# Patient Record
Sex: MALE | Race: WHITE | Employment: OTHER | URBAN - METROPOLITAN AREA
[De-identification: names, ages, dates, MRNs, and addresses within clinical notes are randomized per-mention and may not be internally consistent; named-entity substitution may affect disease eponyms.]

---

## 2018-04-17 ENCOUNTER — HOSPITAL ENCOUNTER (OUTPATIENT)
Dept: NON INVASIVE DIAGNOSTICS | Age: 73
Discharge: HOME | End: 2018-04-17
Payer: MEDICARE

## 2018-04-17 LAB
LV EF: 60 %
LVEF MODALITY: NORMAL

## 2018-04-17 PROCEDURE — 93306 TTE W/DOPPLER COMPLETE: CPT

## 2018-04-26 ENCOUNTER — HOSPITAL ENCOUNTER (OUTPATIENT)
Dept: CARDIAC CATH/INVASIVE PROCEDURES | Age: 73
Discharge: HOME | End: 2018-04-26
Payer: MEDICARE

## 2018-04-26 VITALS
TEMPERATURE: 97.3 F | HEART RATE: 87 BPM | WEIGHT: 220 LBS | OXYGEN SATURATION: 97 % | DIASTOLIC BLOOD PRESSURE: 115 MMHG | SYSTOLIC BLOOD PRESSURE: 154 MMHG | BODY MASS INDEX: 33.34 KG/M2 | HEIGHT: 68 IN

## 2018-04-26 LAB
ABO/RH: NORMAL
ANION GAP SERPL CALCULATED.3IONS-SCNC: 13 MMOL/L (ref 7–16)
ANTIBODY SCREEN: NORMAL
BUN BLDV-MCNC: 13 MG/DL (ref 8–23)
CALCIUM SERPL-MCNC: 9.4 MG/DL (ref 8.6–10.2)
CHLORIDE BLD-SCNC: 99 MMOL/L (ref 98–107)
CO2: 24 MMOL/L (ref 22–29)
CREAT SERPL-MCNC: 0.9 MG/DL (ref 0.7–1.2)
GFR AFRICAN AMERICAN: >60
GFR NON-AFRICAN AMERICAN: >60 ML/MIN/1.73
GLUCOSE BLD-MCNC: 204 MG/DL (ref 74–109)
HCT VFR BLD CALC: 46.5 % (ref 37–54)
HEMOGLOBIN: 15.8 G/DL (ref 12.5–16.5)
MCH RBC QN AUTO: 31.8 PG (ref 26–35)
MCHC RBC AUTO-ENTMCNC: 34 % (ref 32–34.5)
MCV RBC AUTO: 93.6 FL (ref 80–99.9)
PDW BLD-RTO: 12.6 FL (ref 11.5–15)
PLATELET # BLD: 241 E9/L (ref 130–450)
PMV BLD AUTO: 10.8 FL (ref 7–12)
POTASSIUM SERPL-SCNC: 7.1 MMOL/L (ref 3.5–5)
RBC # BLD: 4.97 E12/L (ref 3.8–5.8)
SODIUM BLD-SCNC: 136 MMOL/L (ref 132–146)
WBC # BLD: 9.3 E9/L (ref 4.5–11.5)

## 2018-04-26 PROCEDURE — C1894 INTRO/SHEATH, NON-LASER: HCPCS

## 2018-04-26 PROCEDURE — 6360000002 HC RX W HCPCS

## 2018-04-26 PROCEDURE — 85027 COMPLETE CBC AUTOMATED: CPT

## 2018-04-26 PROCEDURE — 99204 OFFICE O/P NEW MOD 45 MIN: CPT | Performed by: THORACIC SURGERY (CARDIOTHORACIC VASCULAR SURGERY)

## 2018-04-26 PROCEDURE — 86900 BLOOD TYPING SEROLOGIC ABO: CPT

## 2018-04-26 PROCEDURE — 93458 L HRT ARTERY/VENTRICLE ANGIO: CPT | Performed by: INTERNAL MEDICINE

## 2018-04-26 PROCEDURE — 86901 BLOOD TYPING SEROLOGIC RH(D): CPT

## 2018-04-26 PROCEDURE — C1769 GUIDE WIRE: HCPCS

## 2018-04-26 PROCEDURE — 86850 RBC ANTIBODY SCREEN: CPT

## 2018-04-26 PROCEDURE — 2709999900 HC NON-CHARGEABLE SUPPLY

## 2018-04-26 PROCEDURE — 2500000003 HC RX 250 WO HCPCS

## 2018-04-26 PROCEDURE — C1887 CATHETER, GUIDING: HCPCS

## 2018-04-26 PROCEDURE — 2580000003 HC RX 258: Performed by: INTERNAL MEDICINE

## 2018-04-26 PROCEDURE — 80048 BASIC METABOLIC PNL TOTAL CA: CPT

## 2018-04-26 RX ORDER — METOPROLOL SUCCINATE 25 MG/1
25 TABLET, EXTENDED RELEASE ORAL DAILY
Status: ON HOLD | COMMUNITY
End: 2018-05-13 | Stop reason: HOSPADM

## 2018-04-26 RX ORDER — DUTASTERIDE 0.5 MG/1
0.5 CAPSULE, LIQUID FILLED ORAL EVERY MORNING
COMMUNITY
End: 2018-10-17

## 2018-04-26 RX ORDER — SODIUM CHLORIDE 9 MG/ML
INJECTION, SOLUTION INTRAVENOUS CONTINUOUS
Status: DISCONTINUED | OUTPATIENT
Start: 2018-04-26 | End: 2018-04-27 | Stop reason: HOSPADM

## 2018-04-26 RX ORDER — ASPIRIN 325 MG
81 TABLET ORAL EVERY EVENING
COMMUNITY

## 2018-04-26 RX ADMIN — SODIUM CHLORIDE: 9 INJECTION, SOLUTION INTRAVENOUS at 10:39

## 2018-04-26 RX ADMIN — SODIUM CHLORIDE: 9 INJECTION, SOLUTION INTRAVENOUS at 10:38

## 2018-05-03 ENCOUNTER — PREP FOR PROCEDURE (OUTPATIENT)
Dept: CARDIOTHORACIC SURGERY | Age: 73
End: 2018-05-03

## 2018-05-03 DIAGNOSIS — I25.10 CORONARY ARTERY DISEASE INVOLVING NATIVE CORONARY ARTERY, ANGINA PRESENCE UNSPECIFIED, UNSPECIFIED WHETHER NATIVE OR TRANSPLANTED HEART: Primary | ICD-10-CM

## 2018-05-03 RX ORDER — SODIUM CHLORIDE 0.9 % (FLUSH) 0.9 %
10 SYRINGE (ML) INJECTION PRN
Status: CANCELLED | OUTPATIENT
Start: 2018-05-03

## 2018-05-03 RX ORDER — SODIUM CHLORIDE 0.9 % (FLUSH) 0.9 %
10 SYRINGE (ML) INJECTION EVERY 12 HOURS SCHEDULED
Status: CANCELLED | OUTPATIENT
Start: 2018-05-03

## 2018-05-03 RX ORDER — CHLORHEXIDINE GLUCONATE 4 G/100ML
SOLUTION TOPICAL ONCE
Status: CANCELLED | OUTPATIENT
Start: 2018-05-03 | End: 2018-05-03

## 2018-05-03 RX ORDER — CHLORHEXIDINE GLUCONATE 0.12 MG/ML
15 RINSE ORAL ONCE
Status: CANCELLED | OUTPATIENT
Start: 2018-05-03 | End: 2018-05-03

## 2018-05-03 RX ORDER — SODIUM CHLORIDE 9 MG/ML
INJECTION, SOLUTION INTRAVENOUS CONTINUOUS
Status: CANCELLED | OUTPATIENT
Start: 2018-05-03

## 2018-05-03 NOTE — H&P
CC:     HISTORY OF PRESENT ILLNESS:  The patient is a 44-year-old male who started  to develop substernal chest pain at home, primarily with exertion. The  pain has increased in intensity and frequency. The pain is in the center  portion of his chest.  The pain is not necessarily associated with  shortness of breath. Because of the patient's risk factors, he underwent  cardiac catheterization which showed 60-70% distal left main stenosis  as well as an 80% ostial circumflex stenosis. The patient had mild disease  in the LAD and the right coronary artery. The patient's left  ventriculogram showed near normal ventricular function. The patient was  referred for the above. The patient currently denies any chest pain or  shortness of breath.     PAST MEDICAL HISTORY:  Includes hypertension, hyperlipidemia, non-insulin  dependent diabetes, coronary artery disease.     PAST SURGICAL HISTORY:  Tonsillectomy, colonoscopy, cholecystectomy,  cardiac catheterization, appendectomy.     SOCIAL HISTORY:  Denies tobacco or significant alcohol use.     FAMILY HISTORY:  Noncontributory for premature coronary artery disease.     REVIEW OF SYSTEMS:  CONSTITUTIONAL:  The patient denies fever and chills. HEENT:  The patient denies double or blurred vision or hearing loss. HEART:  The patient complains of chest pain with exertion. Denies  palpitations. LUNGS:  The patient denies shortness of breath or wheezing. GASTROINTESTINAL:  The patient denies nausea, vomiting, constipation, or  diarrhea. GENITOURINARY:  The patient denies dysuria or hematuria. PSYCH:  The patient denies anxiety or depression symptoms. NEURO:  The patient denies focal motor weakness, tremors, seizure  activities.     All other pertinent review of systems are negative.     PHYSICAL EXAMINATION:  GENERAL:  The patient alert and oriented, pleasant, cooperative. HEENT:  Grossly within normal limits. NECK:  Without JVD or obvious bruits.   HEART: Regular rhythm without audible murmurs. LUNGS:  Clear to auscultation bilateral without rales or wheezing. ABDOMEN:  Soft, slightly obese, nontender. EXTREMITIES:  Without clubbing, cyanosis, or obvious varicosities. NEURO:  No obvious motor or sensory deficits.       ASSESSMENT:  Severe two-vessel coronary artery disease involving the distal  left main, ostial circumflex.     PLAN:  Recommend coronary artery bypass grafting. Dr. Liu Medrano discussed the  procedure as well as risks and complications with the patient and family. There is 1-2% mortality and 5-10% major morbidity. The patient understands  and wishes to proceed.   5/8/18, he will go for coronary  bypass grafting.

## 2018-05-04 ENCOUNTER — HOSPITAL ENCOUNTER (OUTPATIENT)
Dept: PREADMISSION TESTING | Age: 73
Discharge: HOME | End: 2018-05-04
Payer: MEDICARE

## 2018-05-04 ENCOUNTER — HOSPITAL ENCOUNTER (OUTPATIENT)
Dept: GENERAL RADIOLOGY | Age: 73
Discharge: HOME | End: 2018-05-06
Payer: MEDICARE

## 2018-05-04 ENCOUNTER — HOSPITAL ENCOUNTER (OUTPATIENT)
Dept: ULTRASOUND IMAGING | Age: 73
Discharge: HOME | End: 2018-05-06
Payer: MEDICARE

## 2018-05-04 ENCOUNTER — HOSPITAL ENCOUNTER (OUTPATIENT)
Dept: INTERVENTIONAL RADIOLOGY/VASCULAR | Age: 73
Discharge: HOME | End: 2018-05-06
Payer: MEDICARE

## 2018-05-04 ENCOUNTER — HOSPITAL ENCOUNTER (OUTPATIENT)
Dept: PULMONOLOGY | Age: 73
Discharge: HOME | End: 2018-05-04
Payer: MEDICARE

## 2018-05-04 ENCOUNTER — ANESTHESIA EVENT (OUTPATIENT)
Dept: OPERATING ROOM | Age: 73
DRG: 236 | End: 2018-05-04
Payer: MEDICARE

## 2018-05-04 VITALS
RESPIRATION RATE: 12 BRPM | WEIGHT: 221 LBS | HEART RATE: 78 BPM | TEMPERATURE: 97.9 F | SYSTOLIC BLOOD PRESSURE: 122 MMHG | DIASTOLIC BLOOD PRESSURE: 66 MMHG | BODY MASS INDEX: 33.49 KG/M2 | OXYGEN SATURATION: 95 % | HEIGHT: 68 IN

## 2018-05-04 DIAGNOSIS — I25.10 CORONARY ARTERY DISEASE INVOLVING NATIVE CORONARY ARTERY OF NATIVE HEART WITHOUT ANGINA PECTORIS: ICD-10-CM

## 2018-05-04 DIAGNOSIS — I25.10 CORONARY ARTERY DISEASE INVOLVING NATIVE HEART, ANGINA PRESENCE UNSPECIFIED, UNSPECIFIED VESSEL OR LESION TYPE: ICD-10-CM

## 2018-05-04 DIAGNOSIS — I25.10 CORONARY ARTERY DISEASE INVOLVING NATIVE CORONARY ARTERY, ANGINA PRESENCE UNSPECIFIED, UNSPECIFIED WHETHER NATIVE OR TRANSPLANTED HEART: ICD-10-CM

## 2018-05-04 DIAGNOSIS — R09.89 BRUIT: ICD-10-CM

## 2018-05-04 LAB
ABO/RH: NORMAL
ANION GAP SERPL CALCULATED.3IONS-SCNC: 13 MMOL/L (ref 7–16)
ANTIBODY SCREEN: NORMAL
APTT: 29.6 SEC (ref 24.5–35.1)
BILIRUBIN URINE: NEGATIVE
BLOOD, URINE: NEGATIVE
BUN BLDV-MCNC: 14 MG/DL (ref 8–23)
CALCIUM SERPL-MCNC: 9.3 MG/DL (ref 8.6–10.2)
CHLORIDE BLD-SCNC: 97 MMOL/L (ref 98–107)
CLARITY: CLEAR
CO2: 25 MMOL/L (ref 22–29)
COLOR: YELLOW
CREAT SERPL-MCNC: 0.9 MG/DL (ref 0.7–1.2)
EKG ATRIAL RATE: 68 BPM
EKG P AXIS: 61 DEGREES
EKG P-R INTERVAL: 206 MS
EKG Q-T INTERVAL: 420 MS
EKG QRS DURATION: 92 MS
EKG QTC CALCULATION (BAZETT): 446 MS
EKG R AXIS: 10 DEGREES
EKG T AXIS: 63 DEGREES
EKG VENTRICULAR RATE: 68 BPM
GFR AFRICAN AMERICAN: >60
GFR NON-AFRICAN AMERICAN: >60 ML/MIN/1.73
GLUCOSE BLD-MCNC: 234 MG/DL (ref 74–109)
GLUCOSE URINE: 500 MG/DL
HBA1C MFR BLD: 8.5 % (ref 4.8–5.9)
HCT VFR BLD CALC: 46.6 % (ref 37–54)
HEMOGLOBIN: 15.7 G/DL (ref 12.5–16.5)
INR BLD: 1
KETONES, URINE: NEGATIVE MG/DL
LEUKOCYTE ESTERASE, URINE: NEGATIVE
MCH RBC QN AUTO: 32 PG (ref 26–35)
MCHC RBC AUTO-ENTMCNC: 33.7 % (ref 32–34.5)
MCV RBC AUTO: 94.9 FL (ref 80–99.9)
NITRITE, URINE: NEGATIVE
PDW BLD-RTO: 12.7 FL (ref 11.5–15)
PH UA: 6 (ref 5–9)
PLATELET # BLD: 217 E9/L (ref 130–450)
PMV BLD AUTO: 10.6 FL (ref 7–12)
POTASSIUM REFLEX MAGNESIUM: 4.2 MMOL/L (ref 3.5–5)
PROTEIN UA: NEGATIVE MG/DL
PROTHROMBIN TIME: 11.4 SEC (ref 9.3–12.4)
RBC # BLD: 4.91 E12/L (ref 3.8–5.8)
SODIUM BLD-SCNC: 135 MMOL/L (ref 132–146)
SPECIFIC GRAVITY UA: 1.02 (ref 1–1.03)
UROBILINOGEN, URINE: 0.2 E.U./DL
WBC # BLD: 7.8 E9/L (ref 4.5–11.5)

## 2018-05-04 PROCEDURE — 86850 RBC ANTIBODY SCREEN: CPT

## 2018-05-04 PROCEDURE — 93970 EXTREMITY STUDY: CPT

## 2018-05-04 PROCEDURE — 86901 BLOOD TYPING SEROLOGIC RH(D): CPT

## 2018-05-04 PROCEDURE — 94375 RESPIRATORY FLOW VOLUME LOOP: CPT

## 2018-05-04 PROCEDURE — 80048 BASIC METABOLIC PNL TOTAL CA: CPT

## 2018-05-04 PROCEDURE — 94010 BREATHING CAPACITY TEST: CPT | Performed by: INTERNAL MEDICINE

## 2018-05-04 PROCEDURE — 81003 URINALYSIS AUTO W/O SCOPE: CPT

## 2018-05-04 PROCEDURE — 94729 DIFFUSING CAPACITY: CPT | Performed by: INTERNAL MEDICINE

## 2018-05-04 PROCEDURE — 71046 X-RAY EXAM CHEST 2 VIEWS: CPT

## 2018-05-04 PROCEDURE — 36415 COLL VENOUS BLD VENIPUNCTURE: CPT

## 2018-05-04 PROCEDURE — 86900 BLOOD TYPING SEROLOGIC ABO: CPT

## 2018-05-04 PROCEDURE — 85730 THROMBOPLASTIN TIME PARTIAL: CPT

## 2018-05-04 PROCEDURE — 93880 EXTRACRANIAL BILAT STUDY: CPT

## 2018-05-04 PROCEDURE — 93922 UPR/L XTREMITY ART 2 LEVELS: CPT

## 2018-05-04 PROCEDURE — 94729 DIFFUSING CAPACITY: CPT

## 2018-05-04 PROCEDURE — 87081 CULTURE SCREEN ONLY: CPT

## 2018-05-04 PROCEDURE — 87088 URINE BACTERIA CULTURE: CPT

## 2018-05-04 PROCEDURE — 85027 COMPLETE CBC AUTOMATED: CPT

## 2018-05-04 PROCEDURE — 83036 HEMOGLOBIN GLYCOSYLATED A1C: CPT

## 2018-05-04 PROCEDURE — 85610 PROTHROMBIN TIME: CPT

## 2018-05-04 ASSESSMENT — ENCOUNTER SYMPTOMS: SHORTNESS OF BREATH: 1

## 2018-05-05 ENCOUNTER — HOSPITAL ENCOUNTER (EMERGENCY)
Age: 73
Discharge: HOME | End: 2018-05-05
Attending: EMERGENCY MEDICINE
Payer: MEDICARE

## 2018-05-05 ENCOUNTER — APPOINTMENT (OUTPATIENT)
Dept: GENERAL RADIOLOGY | Age: 73
End: 2018-05-05
Payer: MEDICARE

## 2018-05-05 VITALS
BODY MASS INDEX: 33.34 KG/M2 | HEIGHT: 68 IN | WEIGHT: 220 LBS | RESPIRATION RATE: 18 BRPM | TEMPERATURE: 98.3 F | DIASTOLIC BLOOD PRESSURE: 74 MMHG | SYSTOLIC BLOOD PRESSURE: 119 MMHG | OXYGEN SATURATION: 95 % | HEART RATE: 75 BPM

## 2018-05-05 DIAGNOSIS — I20.9 ANGINA PECTORIS (HCC): Primary | ICD-10-CM

## 2018-05-05 LAB
ANION GAP SERPL CALCULATED.3IONS-SCNC: 16 MMOL/L (ref 7–16)
BASOPHILS ABSOLUTE: 0.08 E9/L (ref 0–0.2)
BASOPHILS RELATIVE PERCENT: 1.2 % (ref 0–2)
BUN BLDV-MCNC: 16 MG/DL (ref 8–23)
CALCIUM SERPL-MCNC: 9.1 MG/DL (ref 8.6–10.2)
CHLORIDE BLD-SCNC: 98 MMOL/L (ref 98–107)
CO2: 21 MMOL/L (ref 22–29)
CREAT SERPL-MCNC: 1 MG/DL (ref 0.7–1.2)
EOSINOPHILS ABSOLUTE: 0.13 E9/L (ref 0.05–0.5)
EOSINOPHILS RELATIVE PERCENT: 2 % (ref 0–6)
GFR AFRICAN AMERICAN: >60
GFR NON-AFRICAN AMERICAN: >60 ML/MIN/1.73
GLUCOSE BLD-MCNC: 343 MG/DL (ref 74–109)
HCT VFR BLD CALC: 43.7 % (ref 37–54)
HEMOGLOBIN: 15 G/DL (ref 12.5–16.5)
IMMATURE GRANULOCYTES #: 0.02 E9/L
IMMATURE GRANULOCYTES %: 0.3 % (ref 0–5)
LYMPHOCYTES ABSOLUTE: 1.29 E9/L (ref 1.5–4)
LYMPHOCYTES RELATIVE PERCENT: 19.5 % (ref 20–42)
MCH RBC QN AUTO: 32.9 PG (ref 26–35)
MCHC RBC AUTO-ENTMCNC: 34.3 % (ref 32–34.5)
MCV RBC AUTO: 95.8 FL (ref 80–99.9)
MONOCYTES ABSOLUTE: 0.7 E9/L (ref 0.1–0.95)
MONOCYTES RELATIVE PERCENT: 10.6 % (ref 2–12)
MRSA CULTURE ONLY: NORMAL
NEUTROPHILS ABSOLUTE: 4.39 E9/L (ref 1.8–7.3)
NEUTROPHILS RELATIVE PERCENT: 66.4 % (ref 43–80)
PDW BLD-RTO: 12.6 FL (ref 11.5–15)
PLATELET # BLD: 196 E9/L (ref 130–450)
PMV BLD AUTO: 11 FL (ref 7–12)
POTASSIUM SERPL-SCNC: 4.1 MMOL/L (ref 3.5–5)
PRO-BNP: 21 PG/ML (ref 0–125)
RBC # BLD: 4.56 E12/L (ref 3.8–5.8)
SODIUM BLD-SCNC: 135 MMOL/L (ref 132–146)
TROPONIN: <0.01 NG/ML (ref 0–0.03)
TROPONIN: <0.01 NG/ML (ref 0–0.03)
WBC # BLD: 6.6 E9/L (ref 4.5–11.5)

## 2018-05-05 PROCEDURE — 71045 X-RAY EXAM CHEST 1 VIEW: CPT

## 2018-05-05 PROCEDURE — 85025 COMPLETE CBC W/AUTO DIFF WBC: CPT

## 2018-05-05 PROCEDURE — 80048 BASIC METABOLIC PNL TOTAL CA: CPT

## 2018-05-05 PROCEDURE — 83880 ASSAY OF NATRIURETIC PEPTIDE: CPT

## 2018-05-05 PROCEDURE — 99285 EMERGENCY DEPT VISIT HI MDM: CPT

## 2018-05-05 PROCEDURE — 84484 ASSAY OF TROPONIN QUANT: CPT

## 2018-05-05 PROCEDURE — 93005 ELECTROCARDIOGRAM TRACING: CPT | Performed by: EMERGENCY MEDICINE

## 2018-05-05 RX ORDER — NITROGLYCERIN 0.3 MG/1
TABLET SUBLINGUAL
Qty: 30 TABLET | Refills: 0 | Status: SHIPPED | OUTPATIENT
Start: 2018-05-05 | End: 2019-06-28 | Stop reason: SDUPTHER

## 2018-05-05 ASSESSMENT — ENCOUNTER SYMPTOMS
SORE THROAT: 0
SINUS PRESSURE: 0
EYE PAIN: 0
EYE DISCHARGE: 0
WHEEZING: 0
COUGH: 0
EYE REDNESS: 0
SHORTNESS OF BREATH: 1
NAUSEA: 0
VOMITING: 0
BACK PAIN: 0
ABDOMINAL PAIN: 0
DIARRHEA: 0

## 2018-05-06 LAB
ORGANISM: ABNORMAL
URINE CULTURE, ROUTINE: ABNORMAL
URINE CULTURE, ROUTINE: ABNORMAL

## 2018-05-07 LAB
EKG ATRIAL RATE: 100 BPM
EKG P AXIS: 68 DEGREES
EKG P-R INTERVAL: 204 MS
EKG Q-T INTERVAL: 364 MS
EKG QRS DURATION: 96 MS
EKG QTC CALCULATION (BAZETT): 469 MS
EKG R AXIS: 40 DEGREES
EKG T AXIS: 63 DEGREES
EKG VENTRICULAR RATE: 100 BPM

## 2018-05-07 NOTE — PROGRESS NOTES
SPOKE TO BLOOD BANK REGARDING UNITS AVAILABLE FOR SURGERY 5/8/18. UNABLE TO SEE UNITS AVAILABLE IN SYSTEM. BLOOD BANK STATES PREPARE WAS CANCELLED THUS NOT DONE. PREPARE FOR 2 UNITS REORDERED. OORDER VERIFIED WITH LAB.

## 2018-05-08 ENCOUNTER — ANESTHESIA (OUTPATIENT)
Dept: OPERATING ROOM | Age: 73
DRG: 236 | End: 2018-05-08
Payer: MEDICARE

## 2018-05-08 ENCOUNTER — HOSPITAL ENCOUNTER (INPATIENT)
Age: 73
LOS: 5 days | Discharge: HOME HEALTH | DRG: 236 | End: 2018-05-13
Attending: THORACIC SURGERY (CARDIOTHORACIC VASCULAR SURGERY) | Admitting: THORACIC SURGERY (CARDIOTHORACIC VASCULAR SURGERY)
Payer: MEDICARE

## 2018-05-08 ENCOUNTER — APPOINTMENT (OUTPATIENT)
Dept: GENERAL RADIOLOGY | Age: 73
DRG: 236 | End: 2018-05-08
Attending: THORACIC SURGERY (CARDIOTHORACIC VASCULAR SURGERY)
Payer: MEDICARE

## 2018-05-08 VITALS
TEMPERATURE: 98.2 F | OXYGEN SATURATION: 100 % | SYSTOLIC BLOOD PRESSURE: 116 MMHG | DIASTOLIC BLOOD PRESSURE: 70 MMHG | RESPIRATION RATE: 6 BRPM

## 2018-05-08 DIAGNOSIS — Z01.818 PRE-OP TESTING: Primary | ICD-10-CM

## 2018-05-08 DIAGNOSIS — G89.18 ACUTE POST-OPERATIVE PAIN: ICD-10-CM

## 2018-05-08 PROBLEM — E11.9 TYPE 2 DIABETES MELLITUS WITHOUT COMPLICATION, WITHOUT LONG-TERM CURRENT USE OF INSULIN (HCC): Status: ACTIVE | Noted: 2018-05-08

## 2018-05-08 PROBLEM — Z95.1 S/P CABG X 2: Status: ACTIVE | Noted: 2018-05-08

## 2018-05-08 PROBLEM — I25.10 CAD IN NATIVE ARTERY: Status: ACTIVE | Noted: 2018-05-08

## 2018-05-08 PROBLEM — R06.89 RESPIRATORY INSUFFICIENCY: Status: ACTIVE | Noted: 2018-05-08

## 2018-05-08 LAB
AADO2: 178.7 MMHG
AADO2: 201.3 MMHG
AADO2: 257.1 MMHG
ANION GAP SERPL CALCULATED.3IONS-SCNC: 12 MMOL/L (ref 7–16)
APTT: 28.8 SEC (ref 24.5–35.1)
B.E.: -2 MMOL/L (ref -3–3)
B.E.: -2.1 MMOL/L (ref -3–3)
B.E.: -3.2 MMOL/L (ref -3–0)
B.E.: -3.3 MMOL/L (ref -3–0)
B.E.: -3.6 MMOL/L (ref -3–3)
B.E.: -3.6 MMOL/L (ref -3–3)
BUN BLDV-MCNC: 12 MG/DL (ref 8–23)
CALCIUM SERPL-MCNC: 9.4 MG/DL (ref 8.6–10.2)
CARDIOPULMONARY BYPASS: YES
CARDIOPULMONARY BYPASS: YES
CHLORIDE BLD-SCNC: 103 MMOL/L (ref 98–107)
CO2: 22 MMOL/L (ref 22–29)
COHB: 0.7 % (ref 0–1.5)
COHB: 0.8 % (ref 0–1.5)
COHB: 1.2 % (ref 0–1.5)
COHB: 1.2 % (ref 0–1.5)
CREAT SERPL-MCNC: 0.8 MG/DL (ref 0.7–1.2)
CRITICAL: ABNORMAL
CRITICAL: NORMAL
DATE ANALYZED: ABNORMAL
DATE ANALYZED: NORMAL
DATE OF COLLECTION: ABNORMAL
DATE OF COLLECTION: NORMAL
DEVICE: ABNORMAL
DEVICE: ABNORMAL
FIO2 ARTERIAL: 100
FIO2: 50 %
FIO2: 60 %
FIO2: 60 %
GFR AFRICAN AMERICAN: >60
GFR NON-AFRICAN AMERICAN: >60 ML/MIN/1.73
GLUCOSE BLD-MCNC: 205 MG/DL
GLUCOSE BLD-MCNC: 252 MG/DL (ref 74–109)
HCO3 ARTERIAL: 22.5 MMOL/L (ref 22–26)
HCO3 ARTERIAL: 23.1 MMOL/L (ref 22–26)
HCO3: 20.9 MMOL/L (ref 22–26)
HCO3: 23.2 MMOL/L (ref 22–26)
HCO3: 23.6 MMOL/L (ref 22–26)
HCO3: 23.8 MMOL/L (ref 22–26)
HCT VFR BLD CALC: 36.8 % (ref 37–54)
HCT,ARTERIAL: 29 % (ref 37–54)
HCT,ARTERIAL: 31 % (ref 37–54)
HEMOGLOBIN: 13.1 G/DL (ref 12.5–16.5)
HGB, ARTERIAL: 10.6 G/DL (ref 12.5–15.5)
HGB, ARTERIAL: 9.7 G/DL (ref 12.5–15.5)
HHB: 0.7 % (ref 0–5)
HHB: 1.3 % (ref 0–5)
HHB: 2.3 % (ref 0–5)
HHB: 2.6 % (ref 0–5)
INR BLD: 1.1
LAB: ABNORMAL
LAB: NORMAL
Lab: 1045
Lab: 1149
Lab: 1536
Lab: 1650
Lab: 2002
MAGNESIUM: 2.4 MG/DL (ref 1.6–2.6)
MCH RBC QN AUTO: 34 PG (ref 26–35)
MCHC RBC AUTO-ENTMCNC: 35.6 % (ref 32–34.5)
MCV RBC AUTO: 95.6 FL (ref 80–99.9)
METER GLUCOSE: 136 MG/DL (ref 70–110)
METER GLUCOSE: 137 MG/DL (ref 70–110)
METER GLUCOSE: 151 MG/DL (ref 70–110)
METER GLUCOSE: 161 MG/DL (ref 70–110)
METER GLUCOSE: 165 MG/DL (ref 70–110)
METER GLUCOSE: 182 MG/DL (ref 70–110)
METER GLUCOSE: 189 MG/DL (ref 70–110)
METER GLUCOSE: 205 MG/DL (ref 70–110)
METER GLUCOSE: 206 MG/DL (ref 70–110)
METER GLUCOSE: 208 MG/DL (ref 70–110)
METER GLUCOSE: 248 MG/DL (ref 70–110)
METER GLUCOSE: 249 MG/DL (ref 70–110)
METER GLUCOSE: 266 MG/DL (ref 70–110)
METHB: 0.2 % (ref 0–1.5)
METHB: 0.3 % (ref 0–1.5)
MODE: ABNORMAL
MODE: ABNORMAL
MODE: AC
MODE: AC
O2 SATURATION: 100 % (ref 92–98.5)
O2 SATURATION: 97.4 % (ref 92–98.5)
O2 SATURATION: 97.7 % (ref 92–98.5)
O2 SATURATION: 98.7 % (ref 92–98.5)
O2 SATURATION: 99.3 % (ref 92–98.5)
O2 SATURATION: 99.7 % (ref 92–98.5)
O2HB: 96 % (ref 94–97)
O2HB: 96.7 % (ref 94–97)
O2HB: 97.7 % (ref 94–97)
O2HB: 97.9 % (ref 94–97)
OPERATOR ID: 1868
OPERATOR ID: ABNORMAL
PATIENT TEMP: 37 C
PCO2 ARTERIAL: 43.1 MMHG (ref 35–45)
PCO2 ARTERIAL: 45.9 MMHG (ref 35–45)
PCO2: 36 MMHG (ref 35–45)
PCO2: 41.6 MMHG (ref 35–45)
PCO2: 44.9 MMHG (ref 35–45)
PCO2: 51.2 MMHG (ref 35–45)
PDW BLD-RTO: 12.9 FL (ref 11.5–15)
PEEP/CPAP: 5 CMH?O
PEEP/CPAP: 8 CMH?O
PEEP/CPAP: 8 CMH?O
PFO2: 1.71 MMHG/%
PFO2: 2.4 MMHG/%
PFO2: 2.91 MMHG/%
PH BLOOD GAS: 7.28 (ref 7.35–7.45)
PH BLOOD GAS: 7.31 (ref 7.35–7.45)
PH BLOOD GAS: 7.33 (ref 7.35–7.45)
PH BLOOD GAS: 7.34 (ref 7.35–7.45)
PH BLOOD GAS: 7.36 (ref 7.35–7.45)
PH BLOOD GAS: 7.38 (ref 7.35–7.45)
PLATELET # BLD: 149 E9/L (ref 130–450)
PMV BLD AUTO: 10.7 FL (ref 7–12)
PO2 ARTERIAL: 225.2 MMHG (ref 60–80)
PO2 ARTERIAL: 416.9 MMHG (ref 60–80)
PO2: 102.4 MMHG (ref 60–100)
PO2: 120 MMHG (ref 60–100)
PO2: 174.9 MMHG (ref 60–100)
PO2: 96.7 MMHG (ref 60–100)
POTASSIUM SERPL-SCNC: 3.78 MMOL/L (ref 3.3–5.1)
POTASSIUM SERPL-SCNC: 3.96 MMOL/L (ref 3.3–5.1)
POTASSIUM SERPL-SCNC: 3.99 MMOL/L (ref 3.3–5.1)
POTASSIUM SERPL-SCNC: 4.9 MMOL/L (ref 3.5–5.5)
POTASSIUM SERPL-SCNC: 5 MMOL/L (ref 3.5–5)
POTASSIUM SERPL-SCNC: 5 MMOL/L (ref 3.5–5)
POTASSIUM SERPL-SCNC: 5.21 MMOL/L (ref 3.3–5.1)
POTASSIUM SERPL-SCNC: 5.5 MMOL/L (ref 3.5–5.5)
PROTHROMBIN TIME: 12.7 SEC (ref 9.3–12.4)
PS: 10 CMH20
RBC # BLD: 3.85 E12/L (ref 3.8–5.8)
RI(T): 115 %
RI(T): 149 %
RI(T): 251 %
RR MECHANICAL: 16 B/MIN
RR MECHANICAL: 18 B/MIN
SODIUM BLD-SCNC: 137 MMOL/L (ref 132–146)
SOURCE, BLOOD GAS: ABNORMAL
SOURCE, BLOOD GAS: NORMAL
THB: 11.4 G/DL (ref 11.5–16.5)
THB: 11.5 G/DL (ref 11.5–16.5)
THB: 12.6 G/DL (ref 11.5–16.5)
THB: 13.3 G/DL (ref 11.5–16.5)
TIME ANALYZED: 1046
TIME ANALYZED: 1150
TIME ANALYZED: 1540
TIME ANALYZED: 1659
TIME ANALYZED: 2005
VT MECHANICAL: 600 ML
VT MECHANICAL: 600 ML
WBC # BLD: 12.9 E9/L (ref 4.5–11.5)

## 2018-05-08 PROCEDURE — 87106 FUNGI IDENTIFICATION YEAST: CPT

## 2018-05-08 PROCEDURE — 2500000003 HC RX 250 WO HCPCS: Performed by: THORACIC SURGERY (CARDIOTHORACIC VASCULAR SURGERY)

## 2018-05-08 PROCEDURE — 6360000002 HC RX W HCPCS: Performed by: INTERNAL MEDICINE

## 2018-05-08 PROCEDURE — 80048 BASIC METABOLIC PNL TOTAL CA: CPT

## 2018-05-08 PROCEDURE — 2500000003 HC RX 250 WO HCPCS: Performed by: ANESTHESIOLOGY

## 2018-05-08 PROCEDURE — 6370000000 HC RX 637 (ALT 250 FOR IP): Performed by: NURSE PRACTITIONER

## 2018-05-08 PROCEDURE — 86923 COMPATIBILITY TEST ELECTRIC: CPT

## 2018-05-08 PROCEDURE — 6360000002 HC RX W HCPCS: Performed by: NURSE PRACTITIONER

## 2018-05-08 PROCEDURE — 87102 FUNGUS ISOLATION CULTURE: CPT

## 2018-05-08 PROCEDURE — 33533 CABG ARTERIAL SINGLE: CPT | Performed by: THORACIC SURGERY (CARDIOTHORACIC VASCULAR SURGERY)

## 2018-05-08 PROCEDURE — 99223 1ST HOSP IP/OBS HIGH 75: CPT | Performed by: INTERNAL MEDICINE

## 2018-05-08 PROCEDURE — 87116 MYCOBACTERIA CULTURE: CPT

## 2018-05-08 PROCEDURE — 87015 SPECIMEN INFECT AGNT CONCNTJ: CPT

## 2018-05-08 PROCEDURE — 94799 UNLISTED PULMONARY SVC/PX: CPT

## 2018-05-08 PROCEDURE — 33508 ENDOSCOPIC VEIN HARVEST: CPT | Performed by: THORACIC SURGERY (CARDIOTHORACIC VASCULAR SURGERY)

## 2018-05-08 PROCEDURE — 94640 AIRWAY INHALATION TREATMENT: CPT

## 2018-05-08 PROCEDURE — 6360000002 HC RX W HCPCS: Performed by: THORACIC SURGERY (CARDIOTHORACIC VASCULAR SURGERY)

## 2018-05-08 PROCEDURE — 87070 CULTURE OTHR SPECIMN AEROBIC: CPT

## 2018-05-08 PROCEDURE — 82962 GLUCOSE BLOOD TEST: CPT

## 2018-05-08 PROCEDURE — 3700000001 HC ADD 15 MINUTES (ANESTHESIA): Performed by: THORACIC SURGERY (CARDIOTHORACIC VASCULAR SURGERY)

## 2018-05-08 PROCEDURE — 2500000003 HC RX 250 WO HCPCS: Performed by: NURSE ANESTHETIST, CERTIFIED REGISTERED

## 2018-05-08 PROCEDURE — 85730 THROMBOPLASTIN TIME PARTIAL: CPT

## 2018-05-08 PROCEDURE — 71045 X-RAY EXAM CHEST 1 VIEW: CPT

## 2018-05-08 PROCEDURE — 6370000000 HC RX 637 (ALT 250 FOR IP): Performed by: THORACIC SURGERY (CARDIOTHORACIC VASCULAR SURGERY)

## 2018-05-08 PROCEDURE — 84132 ASSAY OF SERUM POTASSIUM: CPT

## 2018-05-08 PROCEDURE — P9045 ALBUMIN (HUMAN), 5%, 250 ML: HCPCS | Performed by: THORACIC SURGERY (CARDIOTHORACIC VASCULAR SURGERY)

## 2018-05-08 PROCEDURE — 83735 ASSAY OF MAGNESIUM: CPT

## 2018-05-08 PROCEDURE — 2580000003 HC RX 258: Performed by: NURSE ANESTHETIST, CERTIFIED REGISTERED

## 2018-05-08 PROCEDURE — 3609011500 HC BRONCHOSCOPY DIAGNOSTIC OR CELL WASH ONLY: Performed by: INTERNAL MEDICINE

## 2018-05-08 PROCEDURE — 87205 SMEAR GRAM STAIN: CPT

## 2018-05-08 PROCEDURE — 2500000003 HC RX 250 WO HCPCS

## 2018-05-08 PROCEDURE — 33517 CABG ARTERY-VEIN SINGLE: CPT | Performed by: THORACIC SURGERY (CARDIOTHORACIC VASCULAR SURGERY)

## 2018-05-08 PROCEDURE — 2580000003 HC RX 258: Performed by: THORACIC SURGERY (CARDIOTHORACIC VASCULAR SURGERY)

## 2018-05-08 PROCEDURE — 85610 PROTHROMBIN TIME: CPT

## 2018-05-08 PROCEDURE — 2580000003 HC RX 258: Performed by: NURSE PRACTITIONER

## 2018-05-08 PROCEDURE — 36592 COLLECT BLOOD FROM PICC: CPT

## 2018-05-08 PROCEDURE — 99233 SBSQ HOSP IP/OBS HIGH 50: CPT | Performed by: NURSE PRACTITIONER

## 2018-05-08 PROCEDURE — 94770 HC ETCO2 MONITOR DAILY: CPT

## 2018-05-08 PROCEDURE — 0BCJ8ZZ EXTIRPATION OF MATTER FROM LEFT LOWER LUNG LOBE, VIA NATURAL OR ARTIFICIAL OPENING ENDOSCOPIC: ICD-10-PCS | Performed by: INTERNAL MEDICINE

## 2018-05-08 PROCEDURE — 85027 COMPLETE CBC AUTOMATED: CPT

## 2018-05-08 PROCEDURE — 6360000002 HC RX W HCPCS: Performed by: ANESTHESIOLOGY

## 2018-05-08 PROCEDURE — 5A1221Z PERFORMANCE OF CARDIAC OUTPUT, CONTINUOUS: ICD-10-PCS | Performed by: INTERNAL MEDICINE

## 2018-05-08 PROCEDURE — 6360000002 HC RX W HCPCS

## 2018-05-08 PROCEDURE — 6360000002 HC RX W HCPCS: Performed by: NURSE ANESTHETIST, CERTIFIED REGISTERED

## 2018-05-08 PROCEDURE — 3600000018 HC SURGERY OHS ADDTL 15MIN: Performed by: THORACIC SURGERY (CARDIOTHORACIC VASCULAR SURGERY)

## 2018-05-08 PROCEDURE — 3700000000 HC ANESTHESIA ATTENDED CARE: Performed by: THORACIC SURGERY (CARDIOTHORACIC VASCULAR SURGERY)

## 2018-05-08 PROCEDURE — 87206 SMEAR FLUORESCENT/ACID STAI: CPT

## 2018-05-08 PROCEDURE — 94002 VENT MGMT INPAT INIT DAY: CPT

## 2018-05-08 PROCEDURE — 02100Z9 BYPASS CORONARY ARTERY, ONE ARTERY FROM LEFT INTERNAL MAMMARY, OPEN APPROACH: ICD-10-PCS | Performed by: INTERNAL MEDICINE

## 2018-05-08 PROCEDURE — 06BQ4ZZ EXCISION OF LEFT SAPHENOUS VEIN, PERCUTANEOUS ENDOSCOPIC APPROACH: ICD-10-PCS | Performed by: INTERNAL MEDICINE

## 2018-05-08 PROCEDURE — 99152 MOD SED SAME PHYS/QHP 5/>YRS: CPT | Performed by: INTERNAL MEDICINE

## 2018-05-08 PROCEDURE — 99153 MOD SED SAME PHYS/QHP EA: CPT | Performed by: INTERNAL MEDICINE

## 2018-05-08 PROCEDURE — 36415 COLL VENOUS BLD VENIPUNCTURE: CPT

## 2018-05-08 PROCEDURE — 82805 BLOOD GASES W/O2 SATURATION: CPT

## 2018-05-08 PROCEDURE — 82803 BLOOD GASES ANY COMBINATION: CPT

## 2018-05-08 PROCEDURE — 2720000010 HC SURG SUPPLY STERILE: Performed by: THORACIC SURGERY (CARDIOTHORACIC VASCULAR SURGERY)

## 2018-05-08 PROCEDURE — 6370000000 HC RX 637 (ALT 250 FOR IP)

## 2018-05-08 PROCEDURE — 31624 DX BRONCHOSCOPE/LAVAGE: CPT | Performed by: INTERNAL MEDICINE

## 2018-05-08 PROCEDURE — 021009W BYPASS CORONARY ARTERY, ONE ARTERY FROM AORTA WITH AUTOLOGOUS VENOUS TISSUE, OPEN APPROACH: ICD-10-PCS | Performed by: THORACIC SURGERY (CARDIOTHORACIC VASCULAR SURGERY)

## 2018-05-08 PROCEDURE — 3600000008 HC SURGERY OHS BASE: Performed by: THORACIC SURGERY (CARDIOTHORACIC VASCULAR SURGERY)

## 2018-05-08 PROCEDURE — 2580000003 HC RX 258: Performed by: INTERNAL MEDICINE

## 2018-05-08 PROCEDURE — 2000000000 HC ICU R&B

## 2018-05-08 RX ORDER — POTASSIUM CHLORIDE 29.8 MG/ML
20 INJECTION INTRAVENOUS PRN
Status: DISCONTINUED | OUTPATIENT
Start: 2018-05-08 | End: 2018-05-11

## 2018-05-08 RX ORDER — MIDAZOLAM HYDROCHLORIDE 1 MG/ML
INJECTION INTRAMUSCULAR; INTRAVENOUS PRN
Status: DISCONTINUED | OUTPATIENT
Start: 2018-05-08 | End: 2018-05-08 | Stop reason: SDUPTHER

## 2018-05-08 RX ORDER — CALCIUM CHLORIDE 100 MG/ML
INJECTION INTRAVENOUS; INTRAVENTRICULAR PRN
Status: DISCONTINUED | OUTPATIENT
Start: 2018-05-08 | End: 2018-05-08 | Stop reason: SDUPTHER

## 2018-05-08 RX ORDER — ACETAMINOPHEN 325 MG/1
650 TABLET ORAL EVERY 4 HOURS PRN
Status: DISCONTINUED | OUTPATIENT
Start: 2018-05-08 | End: 2018-05-11

## 2018-05-08 RX ORDER — CHLORHEXIDINE GLUCONATE 0.12 MG/ML
RINSE ORAL
Status: COMPLETED
Start: 2018-05-08 | End: 2018-05-08

## 2018-05-08 RX ORDER — PROPOFOL 10 MG/ML
INJECTION, EMULSION INTRAVENOUS
Status: COMPLETED
Start: 2018-05-08 | End: 2018-05-08

## 2018-05-08 RX ORDER — ALBUMIN, HUMAN INJ 5% 5 %
25 SOLUTION INTRAVENOUS PRN
Status: DISCONTINUED | OUTPATIENT
Start: 2018-05-08 | End: 2018-05-11

## 2018-05-08 RX ORDER — MAGNESIUM SULFATE IN WATER 40 MG/ML
2 INJECTION, SOLUTION INTRAVENOUS PRN
Status: DISCONTINUED | OUTPATIENT
Start: 2018-05-08 | End: 2018-05-11

## 2018-05-08 RX ORDER — CHLORHEXIDINE GLUCONATE 0.12 MG/ML
15 RINSE ORAL 2 TIMES DAILY
Status: DISCONTINUED | OUTPATIENT
Start: 2018-05-08 | End: 2018-05-08

## 2018-05-08 RX ORDER — PROPOFOL 10 MG/ML
INJECTION, EMULSION INTRAVENOUS CONTINUOUS PRN
Status: DISCONTINUED | OUTPATIENT
Start: 2018-05-08 | End: 2018-05-08 | Stop reason: SDUPTHER

## 2018-05-08 RX ORDER — DEXTROSE MONOHYDRATE 50 MG/ML
100 INJECTION, SOLUTION INTRAVENOUS PRN
Status: DISCONTINUED | OUTPATIENT
Start: 2018-05-08 | End: 2018-05-11

## 2018-05-08 RX ORDER — CHLORHEXIDINE GLUCONATE 0.12 MG/ML
15 RINSE ORAL ONCE
Status: COMPLETED | OUTPATIENT
Start: 2018-05-08 | End: 2018-05-08

## 2018-05-08 RX ORDER — ALBUMIN, HUMAN INJ 5% 5 %
25 SOLUTION INTRAVENOUS ONCE
Status: COMPLETED | OUTPATIENT
Start: 2018-05-08 | End: 2018-05-08

## 2018-05-08 RX ORDER — BUDESONIDE 0.5 MG/2ML
0.5 INHALANT ORAL 2 TIMES DAILY
Status: DISCONTINUED | OUTPATIENT
Start: 2018-05-08 | End: 2018-05-13 | Stop reason: HOSPADM

## 2018-05-08 RX ORDER — NICOTINE POLACRILEX 4 MG
15 LOZENGE BUCCAL PRN
Status: DISCONTINUED | OUTPATIENT
Start: 2018-05-08 | End: 2018-05-11

## 2018-05-08 RX ORDER — ATORVASTATIN CALCIUM 40 MG/1
40 TABLET, FILM COATED ORAL DAILY
Status: DISCONTINUED | OUTPATIENT
Start: 2018-05-08 | End: 2018-05-13 | Stop reason: HOSPADM

## 2018-05-08 RX ORDER — VECURONIUM BROMIDE 1 MG/ML
INJECTION, POWDER, LYOPHILIZED, FOR SOLUTION INTRAVENOUS PRN
Status: DISCONTINUED | OUTPATIENT
Start: 2018-05-08 | End: 2018-05-08 | Stop reason: SDUPTHER

## 2018-05-08 RX ORDER — FENTANYL CITRATE 50 UG/ML
25 INJECTION, SOLUTION INTRAMUSCULAR; INTRAVENOUS
Status: DISCONTINUED | OUTPATIENT
Start: 2018-05-08 | End: 2018-05-11

## 2018-05-08 RX ORDER — TAMSULOSIN HYDROCHLORIDE 0.4 MG/1
0.4 CAPSULE ORAL 2 TIMES DAILY
Status: DISCONTINUED | OUTPATIENT
Start: 2018-05-09 | End: 2018-05-13 | Stop reason: HOSPADM

## 2018-05-08 RX ORDER — ASPIRIN 300 MG/1
300 SUPPOSITORY RECTAL ONCE
Status: COMPLETED | OUTPATIENT
Start: 2018-05-08 | End: 2018-05-08

## 2018-05-08 RX ORDER — SODIUM CHLORIDE 9 MG/ML
30 INJECTION, SOLUTION INTRAVENOUS CONTINUOUS
Status: DISCONTINUED | OUTPATIENT
Start: 2018-05-08 | End: 2018-05-11

## 2018-05-08 RX ORDER — LANOLIN ALCOHOL/MO/W.PET/CERES
400 CREAM (GRAM) TOPICAL DAILY
Status: DISCONTINUED | OUTPATIENT
Start: 2018-05-08 | End: 2018-05-13 | Stop reason: HOSPADM

## 2018-05-08 RX ORDER — DOCUSATE SODIUM 100 MG/1
100 CAPSULE, LIQUID FILLED ORAL 2 TIMES DAILY PRN
Status: DISCONTINUED | OUTPATIENT
Start: 2018-05-08 | End: 2018-05-11

## 2018-05-08 RX ORDER — ACETAMINOPHEN 650 MG/1
650 SUPPOSITORY RECTAL EVERY 4 HOURS PRN
Status: DISCONTINUED | OUTPATIENT
Start: 2018-05-08 | End: 2018-05-11

## 2018-05-08 RX ORDER — IPRATROPIUM BROMIDE AND ALBUTEROL SULFATE 2.5; .5 MG/3ML; MG/3ML
1 SOLUTION RESPIRATORY (INHALATION)
Status: DISCONTINUED | OUTPATIENT
Start: 2018-05-08 | End: 2018-05-13 | Stop reason: HOSPADM

## 2018-05-08 RX ORDER — SODIUM CHLORIDE 0.9 % (FLUSH) 0.9 %
10 SYRINGE (ML) INJECTION PRN
Status: DISCONTINUED | OUTPATIENT
Start: 2018-05-08 | End: 2018-05-08 | Stop reason: HOSPADM

## 2018-05-08 RX ORDER — MORPHINE SULFATE 2 MG/ML
2 INJECTION, SOLUTION INTRAMUSCULAR; INTRAVENOUS
Status: DISCONTINUED | OUTPATIENT
Start: 2018-05-08 | End: 2018-05-08

## 2018-05-08 RX ORDER — 0.9 % SODIUM CHLORIDE 0.9 %
250 INTRAVENOUS SOLUTION INTRAVENOUS CONTINUOUS PRN
Status: DISCONTINUED | OUTPATIENT
Start: 2018-05-08 | End: 2018-05-11

## 2018-05-08 RX ORDER — SODIUM CHLORIDE 0.9 % (FLUSH) 0.9 %
10 SYRINGE (ML) INJECTION PRN
Status: DISCONTINUED | OUTPATIENT
Start: 2018-05-08 | End: 2018-05-11

## 2018-05-08 RX ORDER — PROTAMINE SULFATE 10 MG/ML
INJECTION, SOLUTION INTRAVENOUS PRN
Status: DISCONTINUED | OUTPATIENT
Start: 2018-05-08 | End: 2018-05-08 | Stop reason: SDUPTHER

## 2018-05-08 RX ORDER — SODIUM CHLORIDE 0.9 % (FLUSH) 0.9 %
10 SYRINGE (ML) INJECTION EVERY 12 HOURS SCHEDULED
Status: DISCONTINUED | OUTPATIENT
Start: 2018-05-08 | End: 2018-05-11

## 2018-05-08 RX ORDER — INSULIN GLARGINE 100 [IU]/ML
0.15 INJECTION, SOLUTION SUBCUTANEOUS NIGHTLY
Status: DISCONTINUED | OUTPATIENT
Start: 2018-05-09 | End: 2018-05-09

## 2018-05-08 RX ORDER — PROPOFOL 10 MG/ML
INJECTION, EMULSION INTRAVENOUS PRN
Status: DISCONTINUED | OUTPATIENT
Start: 2018-05-08 | End: 2018-05-08 | Stop reason: SDUPTHER

## 2018-05-08 RX ORDER — OXYCODONE HYDROCHLORIDE AND ACETAMINOPHEN 5; 325 MG/1; MG/1
2 TABLET ORAL EVERY 4 HOURS PRN
Status: DISCONTINUED | OUTPATIENT
Start: 2018-05-08 | End: 2018-05-11

## 2018-05-08 RX ORDER — DEXTROSE MONOHYDRATE 25 G/50ML
12.5 INJECTION, SOLUTION INTRAVENOUS PRN
Status: DISCONTINUED | OUTPATIENT
Start: 2018-05-08 | End: 2018-05-11

## 2018-05-08 RX ORDER — NITROGLYCERIN 5 MG/ML
INJECTION, SOLUTION INTRAVENOUS PRN
Status: DISCONTINUED | OUTPATIENT
Start: 2018-05-08 | End: 2018-05-08 | Stop reason: SDUPTHER

## 2018-05-08 RX ORDER — HEPARIN SODIUM 10000 [USP'U]/ML
INJECTION, SOLUTION INTRAVENOUS; SUBCUTANEOUS PRN
Status: DISCONTINUED | OUTPATIENT
Start: 2018-05-08 | End: 2018-05-08 | Stop reason: SDUPTHER

## 2018-05-08 RX ORDER — PROTAMINE SULFATE 10 MG/ML
50 INJECTION, SOLUTION INTRAVENOUS
Status: ACTIVE | OUTPATIENT
Start: 2018-05-08 | End: 2018-05-08

## 2018-05-08 RX ORDER — PROPOFOL 10 MG/ML
10 INJECTION, EMULSION INTRAVENOUS
Status: DISCONTINUED | OUTPATIENT
Start: 2018-05-08 | End: 2018-05-11

## 2018-05-08 RX ORDER — CHLORHEXIDINE GLUCONATE 4 G/100ML
SOLUTION TOPICAL ONCE
Status: COMPLETED | OUTPATIENT
Start: 2018-05-08 | End: 2018-05-08

## 2018-05-08 RX ORDER — FENTANYL CITRATE 0.05 MG/ML
INJECTION, SOLUTION INTRAMUSCULAR; INTRAVENOUS PRN
Status: DISCONTINUED | OUTPATIENT
Start: 2018-05-08 | End: 2018-05-08 | Stop reason: SDUPTHER

## 2018-05-08 RX ORDER — ONDANSETRON 2 MG/ML
4 INJECTION INTRAMUSCULAR; INTRAVENOUS EVERY 8 HOURS PRN
Status: DISCONTINUED | OUTPATIENT
Start: 2018-05-08 | End: 2018-05-11

## 2018-05-08 RX ORDER — SODIUM CHLORIDE 9 MG/ML
INJECTION, SOLUTION INTRAVENOUS CONTINUOUS
Status: DISCONTINUED | OUTPATIENT
Start: 2018-05-08 | End: 2018-05-08

## 2018-05-08 RX ORDER — MEPERIDINE HYDROCHLORIDE 50 MG/ML
25 INJECTION INTRAMUSCULAR; INTRAVENOUS; SUBCUTANEOUS
Status: DISCONTINUED | OUTPATIENT
Start: 2018-05-08 | End: 2018-05-08

## 2018-05-08 RX ORDER — SODIUM CHLORIDE, SODIUM LACTATE, POTASSIUM CHLORIDE, CALCIUM CHLORIDE 600; 310; 30; 20 MG/100ML; MG/100ML; MG/100ML; MG/100ML
INJECTION, SOLUTION INTRAVENOUS CONTINUOUS PRN
Status: DISCONTINUED | OUTPATIENT
Start: 2018-05-08 | End: 2018-05-08 | Stop reason: SDUPTHER

## 2018-05-08 RX ORDER — ASPIRIN 81 MG/1
81 TABLET ORAL DAILY
Status: DISCONTINUED | OUTPATIENT
Start: 2018-05-09 | End: 2018-05-11

## 2018-05-08 RX ORDER — SODIUM CHLORIDE 0.9 % (FLUSH) 0.9 %
10 SYRINGE (ML) INJECTION EVERY 12 HOURS SCHEDULED
Status: DISCONTINUED | OUTPATIENT
Start: 2018-05-08 | End: 2018-05-08 | Stop reason: HOSPADM

## 2018-05-08 RX ORDER — OXYCODONE HYDROCHLORIDE AND ACETAMINOPHEN 5; 325 MG/1; MG/1
1 TABLET ORAL EVERY 4 HOURS PRN
Status: DISCONTINUED | OUTPATIENT
Start: 2018-05-08 | End: 2018-05-11

## 2018-05-08 RX ADMIN — NITROGLYCERIN 50 MCG: 5 INJECTION, SOLUTION INTRAVENOUS at 09:15

## 2018-05-08 RX ADMIN — PHENYLEPHRINE HYDROCHLORIDE 200 MCG: 10 INJECTION INTRAVENOUS at 07:34

## 2018-05-08 RX ADMIN — FENTANYL CITRATE 100 MCG: 50 INJECTION, SOLUTION INTRAMUSCULAR; INTRAVENOUS at 07:14

## 2018-05-08 RX ADMIN — CHLORHEXIDINE GLUCONATE: 213 SOLUTION TOPICAL at 06:25

## 2018-05-08 RX ADMIN — VECURONIUM BROMIDE 5 MG: 1 INJECTION, POWDER, LYOPHILIZED, FOR SOLUTION INTRAVENOUS at 07:58

## 2018-05-08 RX ADMIN — VECURONIUM BROMIDE 10 MG: 1 INJECTION, POWDER, LYOPHILIZED, FOR SOLUTION INTRAVENOUS at 06:45

## 2018-05-08 RX ADMIN — SODIUM CHLORIDE: 9 INJECTION, SOLUTION INTRAVENOUS at 06:10

## 2018-05-08 RX ADMIN — FENTANYL CITRATE 25 MCG: 50 INJECTION INTRAMUSCULAR; INTRAVENOUS at 16:25

## 2018-05-08 RX ADMIN — FENTANYL CITRATE 25 MCG: 50 INJECTION INTRAMUSCULAR; INTRAVENOUS at 21:58

## 2018-05-08 RX ADMIN — PHENYLEPHRINE HYDROCHLORIDE 200 MCG: 10 INJECTION INTRAVENOUS at 07:30

## 2018-05-08 RX ADMIN — VASOPRESSIN 0.02 UNITS/MIN: 20 INJECTION INTRAVENOUS at 22:40

## 2018-05-08 RX ADMIN — PHENYLEPHRINE HYDROCHLORIDE 100 MCG: 10 INJECTION INTRAVENOUS at 07:36

## 2018-05-08 RX ADMIN — PROPOFOL 25 MCG/KG/MIN: 10 INJECTION, EMULSION INTRAVENOUS at 10:30

## 2018-05-08 RX ADMIN — CEFAZOLIN SODIUM 2 G: 2 SOLUTION INTRAVENOUS at 06:38

## 2018-05-08 RX ADMIN — BUDESONIDE 500 MCG: 0.5 SUSPENSION RESPIRATORY (INHALATION) at 19:30

## 2018-05-08 RX ADMIN — CHLORHEXIDINE GLUCONATE 15 ML: 0.12 RINSE ORAL at 06:26

## 2018-05-08 RX ADMIN — CEFAZOLIN SODIUM 2 G: 2 SOLUTION INTRAVENOUS at 22:52

## 2018-05-08 RX ADMIN — CALCIUM CHLORIDE 0.5 G: 100 INJECTION, SOLUTION INTRAVENOUS; INTRAVENTRICULAR at 10:05

## 2018-05-08 RX ADMIN — CHLORHEXIDINE GLUCONATE 15 ML: 1.2 RINSE ORAL at 06:26

## 2018-05-08 RX ADMIN — Medication 10 ML: at 11:52

## 2018-05-08 RX ADMIN — PROPOFOL 90 MG: 10 INJECTION, EMULSION INTRAVENOUS at 06:45

## 2018-05-08 RX ADMIN — HEPARIN SODIUM 40000 UNITS: 10000 INJECTION, SOLUTION INTRAVENOUS; SUBCUTANEOUS at 07:38

## 2018-05-08 RX ADMIN — SODIUM CHLORIDE, POTASSIUM CHLORIDE, SODIUM LACTATE AND CALCIUM CHLORIDE: 600; 310; 30; 20 INJECTION, SOLUTION INTRAVENOUS at 06:30

## 2018-05-08 RX ADMIN — WATER 1 G: 1 INJECTION INTRAMUSCULAR; INTRAVENOUS; SUBCUTANEOUS at 17:30

## 2018-05-08 RX ADMIN — CHLORHEXIDINE GLUCONATE 15 ML: 1.2 RINSE ORAL at 11:57

## 2018-05-08 RX ADMIN — LIDOCAINE HYDROCHLORIDE 80 MG: 20 INJECTION, SOLUTION INTRAVENOUS at 06:45

## 2018-05-08 RX ADMIN — FENTANYL CITRATE 100 MCG: 50 INJECTION, SOLUTION INTRAMUSCULAR; INTRAVENOUS at 07:23

## 2018-05-08 RX ADMIN — CEFAZOLIN SODIUM 2 G: 2 SOLUTION INTRAVENOUS at 14:30

## 2018-05-08 RX ADMIN — ALBUMIN (HUMAN) 25 G: 12.5 INJECTION, SOLUTION INTRAVENOUS at 11:31

## 2018-05-08 RX ADMIN — MUPIROCIN: 20 OINTMENT TOPICAL at 22:39

## 2018-05-08 RX ADMIN — IPRATROPIUM BROMIDE AND ALBUTEROL SULFATE 1 AMPULE: 2.5; .5 SOLUTION RESPIRATORY (INHALATION) at 19:30

## 2018-05-08 RX ADMIN — MUPIROCIN: 20 OINTMENT TOPICAL at 11:49

## 2018-05-08 RX ADMIN — FENTANYL CITRATE 100 MCG: 50 INJECTION, SOLUTION INTRAMUSCULAR; INTRAVENOUS at 06:45

## 2018-05-08 RX ADMIN — FENTANYL CITRATE 300 MCG: 50 INJECTION, SOLUTION INTRAMUSCULAR; INTRAVENOUS at 07:17

## 2018-05-08 RX ADMIN — IPRATROPIUM BROMIDE AND ALBUTEROL SULFATE 1 AMPULE: 2.5; .5 SOLUTION RESPIRATORY (INHALATION) at 11:58

## 2018-05-08 RX ADMIN — ALBUMIN (HUMAN) 25 G: 12.5 INJECTION, SOLUTION INTRAVENOUS at 21:50

## 2018-05-08 RX ADMIN — INSULIN LISPRO 3 UNITS: 100 INJECTION, SOLUTION INTRAVENOUS; SUBCUTANEOUS at 10:48

## 2018-05-08 RX ADMIN — SODIUM CHLORIDE 35 ML/HR: 9 INJECTION, SOLUTION INTRAVENOUS at 11:00

## 2018-05-08 RX ADMIN — ATORVASTATIN CALCIUM 40 MG: 40 TABLET, FILM COATED ORAL at 22:39

## 2018-05-08 RX ADMIN — SODIUM CHLORIDE 250 ML: 9 INJECTION, SOLUTION INTRAVENOUS at 18:29

## 2018-05-08 RX ADMIN — MIDAZOLAM 2 MG: 1 INJECTION INTRAMUSCULAR; INTRAVENOUS at 06:28

## 2018-05-08 RX ADMIN — PROTAMINE SULFATE 300 MG: 10 INJECTION, SOLUTION INTRAVENOUS at 09:16

## 2018-05-08 RX ADMIN — POTASSIUM CHLORIDE 20 MEQ: 29.8 INJECTION, SOLUTION INTRAVENOUS at 17:21

## 2018-05-08 RX ADMIN — Medication 10 ML: at 22:40

## 2018-05-08 RX ADMIN — VECURONIUM BROMIDE 5 MG: 1 INJECTION, POWDER, LYOPHILIZED, FOR SOLUTION INTRAVENOUS at 07:40

## 2018-05-08 RX ADMIN — ALBUMIN (HUMAN) 25 G: 12.5 INJECTION, SOLUTION INTRAVENOUS at 14:48

## 2018-05-08 RX ADMIN — MIDAZOLAM 1 MG: 1 INJECTION INTRAMUSCULAR; INTRAVENOUS at 06:29

## 2018-05-08 RX ADMIN — FENTANYL CITRATE 100 MCG: 50 INJECTION, SOLUTION INTRAMUSCULAR; INTRAVENOUS at 07:10

## 2018-05-08 RX ADMIN — PROPOFOL 20 MCG/KG/MIN: 10 INJECTION, EMULSION INTRAVENOUS at 10:19

## 2018-05-08 RX ADMIN — ASPIRIN 300 MG: 300 SUPPOSITORY RECTAL at 11:57

## 2018-05-08 RX ADMIN — SODIUM CHLORIDE: 9 INJECTION, SOLUTION INTRAVENOUS at 06:28

## 2018-05-08 RX ADMIN — PHENYLEPHRINE HYDROCHLORIDE 100 MCG: 10 INJECTION INTRAVENOUS at 06:46

## 2018-05-08 RX ADMIN — INSULIN LISPRO 3 UNITS: 100 INJECTION, SOLUTION INTRAVENOUS; SUBCUTANEOUS at 20:06

## 2018-05-08 RX ADMIN — FENTANYL CITRATE 100 MCG: 50 INJECTION, SOLUTION INTRAMUSCULAR; INTRAVENOUS at 10:09

## 2018-05-08 RX ADMIN — IPRATROPIUM BROMIDE AND ALBUTEROL SULFATE 1 AMPULE: 2.5; .5 SOLUTION RESPIRATORY (INHALATION) at 15:55

## 2018-05-08 ASSESSMENT — PULMONARY FUNCTION TESTS
PIF_VALUE: 1
PIF_VALUE: 21
PIF_VALUE: 20
PIF_VALUE: 1
PIF_VALUE: 0
PIF_VALUE: 23
PIF_VALUE: 21
PIF_VALUE: 0
PIF_VALUE: 1
PIF_VALUE: 20
PIF_VALUE: 23
PIF_VALUE: 20
PIF_VALUE: 18
PIF_VALUE: 1
PIF_VALUE: 21
PIF_VALUE: 21
PIF_VALUE: 1
PIF_VALUE: 18
PIF_VALUE: 1
PIF_VALUE: 0
PIF_VALUE: 1
PIF_VALUE: 25
PIF_VALUE: 1
PIF_VALUE: 21
PIF_VALUE: 20
PIF_VALUE: 21
PIF_VALUE: 1
PIF_VALUE: 22
PIF_VALUE: 21
PIF_VALUE: 1
PIF_VALUE: 19
PIF_VALUE: 19
PIF_VALUE: 18
PIF_VALUE: 23
PIF_VALUE: 26
PIF_VALUE: 1
PIF_VALUE: 18
PIF_VALUE: 0
PIF_VALUE: 21
PIF_VALUE: 22
PIF_VALUE: 27
PIF_VALUE: 1
PIF_VALUE: 22
PIF_VALUE: 1
PIF_VALUE: 18
PIF_VALUE: 0
PIF_VALUE: 1
PIF_VALUE: 1
PIF_VALUE: 22
PIF_VALUE: 21
PIF_VALUE: 1
PIF_VALUE: 1
PIF_VALUE: 21
PIF_VALUE: 1
PIF_VALUE: 25
PIF_VALUE: 1
PIF_VALUE: 23
PIF_VALUE: 0
PIF_VALUE: 17
PIF_VALUE: 22
PIF_VALUE: 21
PIF_VALUE: 1
PIF_VALUE: 22
PIF_VALUE: 1
PIF_VALUE: 20
PIF_VALUE: 1
PIF_VALUE: 21
PIF_VALUE: 1
PIF_VALUE: 20
PIF_VALUE: 1
PIF_VALUE: 17
PIF_VALUE: 19
PIF_VALUE: 2
PIF_VALUE: 21
PIF_VALUE: 24
PIF_VALUE: 1
PIF_VALUE: 1
PIF_VALUE: 21
PIF_VALUE: 20
PIF_VALUE: 19
PIF_VALUE: 1
PIF_VALUE: 25
PIF_VALUE: 21
PIF_VALUE: 19
PIF_VALUE: 1
PIF_VALUE: 1
PIF_VALUE: 19
PIF_VALUE: 18
PIF_VALUE: 18
PIF_VALUE: 1
PIF_VALUE: 20
PIF_VALUE: 19
PIF_VALUE: 17
PIF_VALUE: 23
PIF_VALUE: 19
PIF_VALUE: 21
PIF_VALUE: 20
PIF_VALUE: 22
PIF_VALUE: 1
PIF_VALUE: 23
PIF_VALUE: 21
PIF_VALUE: 1
PIF_VALUE: 1
PIF_VALUE: 24
PIF_VALUE: 1
PIF_VALUE: 21
PIF_VALUE: 18
PIF_VALUE: 1
PIF_VALUE: 23
PIF_VALUE: 1
PIF_VALUE: 0
PIF_VALUE: 1
PIF_VALUE: 20
PIF_VALUE: 2
PIF_VALUE: 1
PIF_VALUE: 21
PIF_VALUE: 19
PIF_VALUE: 2
PIF_VALUE: 0
PIF_VALUE: 0
PIF_VALUE: 20
PIF_VALUE: 1
PIF_VALUE: 1
PIF_VALUE: 0
PIF_VALUE: 24
PIF_VALUE: 1
PIF_VALUE: 1
PIF_VALUE: 19
PIF_VALUE: 1
PIF_VALUE: 1
PIF_VALUE: 21
PIF_VALUE: 0
PIF_VALUE: 21
PIF_VALUE: 1
PIF_VALUE: 21
PIF_VALUE: 19
PIF_VALUE: 21
PIF_VALUE: 21
PIF_VALUE: 1
PIF_VALUE: 17
PIF_VALUE: 21
PIF_VALUE: 21
PIF_VALUE: 22
PIF_VALUE: 36
PIF_VALUE: 0
PIF_VALUE: 17
PIF_VALUE: 23
PIF_VALUE: 20
PIF_VALUE: 17
PIF_VALUE: 19
PIF_VALUE: 1
PIF_VALUE: 0
PIF_VALUE: 0
PIF_VALUE: 24
PIF_VALUE: 20
PIF_VALUE: 20
PIF_VALUE: 22
PIF_VALUE: 20
PIF_VALUE: 17
PIF_VALUE: 21
PIF_VALUE: 18
PIF_VALUE: 22
PIF_VALUE: 16
PIF_VALUE: 24
PIF_VALUE: 21
PIF_VALUE: 16
PIF_VALUE: 17
PIF_VALUE: 24
PIF_VALUE: 1
PIF_VALUE: 1
PIF_VALUE: 21
PIF_VALUE: 0
PIF_VALUE: 1
PIF_VALUE: 1
PIF_VALUE: 21
PIF_VALUE: 3
PIF_VALUE: 20
PIF_VALUE: 1
PIF_VALUE: 19
PIF_VALUE: 19
PIF_VALUE: 1
PIF_VALUE: 20
PIF_VALUE: 22
PIF_VALUE: 21
PIF_VALUE: 1
PIF_VALUE: 18
PIF_VALUE: 18
PIF_VALUE: 1
PIF_VALUE: 23
PIF_VALUE: 1
PIF_VALUE: 19
PIF_VALUE: 15
PIF_VALUE: 22
PIF_VALUE: 21
PIF_VALUE: 16
PIF_VALUE: 2
PIF_VALUE: 1
PIF_VALUE: 23
PIF_VALUE: 1
PIF_VALUE: 21
PIF_VALUE: 1
PIF_VALUE: 21
PIF_VALUE: 20
PIF_VALUE: 21
PIF_VALUE: 1
PIF_VALUE: 22
PIF_VALUE: 1
PIF_VALUE: 17
PIF_VALUE: 1
PIF_VALUE: 0
PIF_VALUE: 21
PIF_VALUE: 20
PIF_VALUE: 1
PIF_VALUE: 17
PIF_VALUE: 20
PIF_VALUE: 19
PIF_VALUE: 23
PIF_VALUE: 18
PIF_VALUE: 21
PIF_VALUE: 17
PIF_VALUE: 1
PIF_VALUE: 22
PIF_VALUE: 18
PIF_VALUE: 1
PIF_VALUE: 18
PIF_VALUE: 19
PIF_VALUE: 24
PIF_VALUE: 19
PIF_VALUE: 22

## 2018-05-08 ASSESSMENT — PAIN DESCRIPTION - ONSET: ONSET: GRADUAL

## 2018-05-08 ASSESSMENT — PAIN SCALES - GENERAL
PAINLEVEL_OUTOF10: 0
PAINLEVEL_OUTOF10: 7
PAINLEVEL_OUTOF10: 7
PAINLEVEL_OUTOF10: 0
PAINLEVEL_OUTOF10: 7
PAINLEVEL_OUTOF10: 0

## 2018-05-08 ASSESSMENT — PAIN DESCRIPTION - ORIENTATION: ORIENTATION: MID

## 2018-05-08 ASSESSMENT — PAIN - FUNCTIONAL ASSESSMENT: PAIN_FUNCTIONAL_ASSESSMENT: 0-10

## 2018-05-08 ASSESSMENT — PAIN DESCRIPTION - PAIN TYPE: TYPE: SURGICAL PAIN

## 2018-05-08 ASSESSMENT — PAIN DESCRIPTION - DESCRIPTORS: DESCRIPTORS: ACHING

## 2018-05-08 ASSESSMENT — PAIN DESCRIPTION - LOCATION: LOCATION: CHEST

## 2018-05-08 ASSESSMENT — PAIN DESCRIPTION - FREQUENCY: FREQUENCY: INTERMITTENT

## 2018-05-08 ASSESSMENT — PAIN DESCRIPTION - PROGRESSION: CLINICAL_PROGRESSION: GRADUALLY WORSENING

## 2018-05-08 NOTE — OP NOTE
[id number] [facility] NOTE    DATE OF PROCEDURE: 5/8/2018    INDICATIONS & HISTORY:  Inga Julio is a 68 y.o. male with abnormal radiograph post op ? LLL collapse with elevated hemidiaphragm. The risks, benefits, complications, treatment options and expected outcomes were discussed with the patient and appropriate care givers. The possibilities of reaction to medication, pulmonary aspiration, perforation of an organ, bleeding, failure to diagnose a condition and creating a complication requiring transfusion or operation were discussed with the patient/POA who freely signed the informed consent. PREOPERATIVE DIAGNOSIS:    [x] Abnormal CXR,  [x] Respiratory Failure,   [x] Mucous Plug,  [x] Abnormal Imaging CXR/CT scan     POSTOPERATIVE DIAGNOSES:  [x] Thick mucous LLL and trachea,   [x] Normal Anatomy,  [x] Collapsable and friable airways,  [x] Tracheobronchitis    PROCEDURE PERFORMED:   [x] Fiberoptic Bronchoscopy,   [] Endobronchial Ultrasound  [] Bronchoalveolar Lavage [BAL]  [] Transbronchial Biopsy  [] Endobronchial Biopsy,   [x] Bronchial Wash X2  [] EBUS Lymph Node Gerarda [place] Sampling    SURGEON:    Eileen Lopez DO, FCCP, FACP, FACOI    ASSISTANT:    Bronchoscopy Nursing/Technical Team/ICU Team/    SEDATION:   []  General Anesthesia [] Regional Anesthesia [x] ICU Protocol Team,       ANESTHESIA:     [] Lidocaine 2% via intranasal instillation,    [] Epinephrine 1:10,000 diluted Endobronchial administration   [x] ICU Sedation Protcol     ANESTHESIOLOGIST:  None    SPECIMENS:  [x] Bronchial wash   [x] Fungal smear & culture,    [x] Gram stain, C&S,   [] Bronchial brushings sent for cytology. [] Transbronchial biopsy for Pathologic review. [] PCR for Tuberculosis    ESTIMATED BLOOD LOSS: Minimal    DESCRIPTION OF PROCEDURE:  After confirming informed consent a time out was held and the above information confirmed.   Inga Julio was administered appropriate sedation. After an adequate level of sedation was achieved an Olympus fiberoptic bronchoscopy was inserted into the endotracheal tube, advanced the trachea. 2% Lidocaine was used topically within the airway. Careful inspection of the tracheal lumen was accomplished. Trachea was swollen and thick secretions were noted. The airways were collapsible ? YANG. Systematic inspection was then carried out of the entire tracheobronchial tree. The bronchoscope was then advanced to the lower lobes and on the left the subsegmental bronchi were obstruction with thin mucous tan in nature. The suction and lavaged till clear (easily). The right lung and sub-segments were normal.        COMPLICATIONS:   None    IMPRESSIONS:   [x]  Signs of bronchitis and LLL subsegmental collapse /mucous. [x]  Collapsible airways? YANG.     RECOMMENDATIONS:   [x]  Antibtiotics, Bronchodilators   [x]  Follow cultures and ok to wean to extubate      Wendy Ambrose DO, Vandy Craven   of [address]  5/8/2018

## 2018-05-08 NOTE — PROGRESS NOTES
CVICU Admission Note    Name: Bryce Singh  MRN: [id number]    Indication for Surgery/Procedure: CAD  LVEF:  Normal    RVF:  Normal     Important/Relevant PMH/PSH: HTN, HPL, DM, BPH     Procedure/Surgeries: 5/8/2018 CABG x 2 (LIMA to LAD, SVG to OM1)  Pacing wires: Atrial and Ventricular       Physical Exam:    /73   Pulse 83   Temp 97.4 °F (36.3 °C) (Temporal)   Resp 16   Ht 5' 8\" (1.727 m)   Wt 221 lb (100.2 kg)   SpO2 95%   BMI 33.60 kg/m²       Post operative CXR:  Atelectasis Bilateral, No pneumothorax noted, Increased Vascular Markings Bilateral, Pleural effusion left sided with left hemidiaphragm elevation on prior exam. ETT/ chest tubes/ lines appear to be in proper place. General Appearance: Intubated and sedated. HDS   Pulmonary: Diminished bibasilar. No wheezes, no accessory muscle use noted   Ventilator: Mode: A/C, 60% FiO2, 8 PEEP, 600 Vt   Cardiovascular: RRR, no heaves or thrills palpated   Telemetry: NSR  Abdomen: Soft, OG to LIWS   Extremities: Palpable pulses all extremities, no LE edema   Neurologic: sedated, PERRL  Skin: Warm and dry  Incision: MSI with UGO dressing intact, LLE SVG sites with ace wrap       Assessment/Plan: Day of Surgery     1. CAD S/p CABG x 2 (LIMA to LAD, SVG to OM1)  - ASA, statin, start BB tomorrow as BP allows   - Monitor CT output and hemodynamics closely     2. Respiratory Insufficiency  - 2/2 surgery  - Post op CXR with left pleural effusion (known left hemidiaphragm elevation on prior exam), will monitor CT output and repeat CXR in 3 hours  - Plan to wean ventilator settings and WTE as able  - PRN ABGs    3. Acute Post Operative Pain   - PRN Morphine/fentanyl while intubated, start PO PRN regimen once extubated and tolerating PO medications     4.  DM Type 2   - Hgb A1C 8.1%, on glipizide and metformin at home   - SSI q 4 hours post operatively for glycemic control, start RHI infusion if needed for BG >180 per protocol         Electronically signed by KIM Kapoor CNP on 5/8/2018 at 10:30 AM

## 2018-05-08 NOTE — BRIEF OP NOTE
Brief Postoperative Note  ______________________________________________________________    Patient: Smiley Cano  YOB: 1945  MRN: [id number]  Date of Procedure: 5/8/2018    Pre-Op Diagnosis: CAD    Post-Op Diagnosis: Same       Procedure(s):  CABG CORONARY ARTERY BYPASS times 2  LIMA to LAD  SVG to OM1    Anesthesia: General    Surgeon(s):  Ildefonso Vogt DO    Staff:  Scrub Person First: Talmage Kanner  Scrub Person Second: Elsi Hamilton  Physician Assistant: KIM June CNP; FRANCISCA Camarillo     Complications: none    Specimens:   none    Implants:  none      Drains:   Chest Tube 1 Posterior Mediastinal 32 Frisian (Active)       Chest Tube 2 Anterior Mediastinal 32 Frisian (Active)       Chest Tube 3 Left Pleural 19 Frisian (Active)       NG/OG Tube (Active)       Urethral Catheter Temperature probe 16 fr (Active)       Findings: see op note    Ildefonso Vogt DO  Date: 5/8/2018  Time: 10:03 AM

## 2018-05-08 NOTE — OP NOTE
thigh.  Simultaneously, a median sternotomy was  performed. Left internal mammary artery was harvested from the  undersurface of the left chest wall and divided distally after systemic  heparinization. Pericardium was opened and vessels for bypass were  identified. Intraoperative findings were as above. The patient was  cannulated for bypass to the ascending aorta and the right atrium. Antegrade and retrograde cardioplegia cannulas were inserted and utilized  throughout the operation for myocardial protection. Cross clamp was  placed, and heart was arrested first antegrade followed by retrograde  cardioplegia as above. The first anastomosis was reverse saphenous vein to  a small marginal branch of the circumflex. No larger vessel was identified  on the lateral wall. Even on the cardiac catheterization, circumflex  vessels more distally were extremely small. Second anastomosis was left  internal mammary artery which was anastomosed to the midportion of a  moderate-to-large LAD. A 0.5 mm probe passed distally in the LAD prior to  securing the anastomosis. There was good antegrade flow in the mammary  artery. Mammary artery pedicle was tacked to the epicardium. One proximal  anastomosis was placed on the ascending aorta. Cross clamp was removed and  grafts were de-aired. Flow was established to the myocardium. Atrial and  ventricular pacing wires were placed. A Thad drain was left in left  pleural space and a straight and angled chest tubes were left in  mediastinum at the end the operation. Once the patient was warmed, he was  weaned from cardiopulmonary bypass without inotropic support. Echo showed  normal ventricular function and no EKG changes were identified. Protamine  was given and the patient was decannulated in the usual fashion. Once all  surgical bleeding was controlled, sternum was reapproximated with six  sternal wires in a parasternal fashion.   Fascia was reapproximated with  running #1 Vicryl suture and the skin with a 4-0 Vicryl in subcuticular  fashion. Legs were also closed in layers. All sponge counts and  instrument counts were correct. Sterile dressings were applied. The  patient tolerated the procedure, went to the CDU in stable condition.         Rosie Mattson DO    D: 05/08/2018 10:14:52       T: 05/08/2018 10:18:37     [id number]  Job#: [id number]     Doc#: [id number]    CC:

## 2018-05-08 NOTE — PLAN OF CARE
Problem: Fluid Volume - Imbalance:  Goal: Chest tube drainage is within specified parameters  Chest tube drainage is within specified parameters   Outcome: Met This Shift  Monitor chest tube drainage and urine output adm fluids as needed monitor h/h     Problem: Pain:  Goal: Control of acute pain  Control of acute pain   Outcome: Met This Shift  Medicate pt  as ordered reposition pt as melissa

## 2018-05-08 NOTE — PROGRESS NOTES
Dr. Jose Bullock.  Marina Sheth and Dr. Noe Gray and Dr. Abhishek Mead were all consulted on this pt @ 3739 pm.

## 2018-05-08 NOTE — CONSULTS
[place]  Division of Pulmonary, Critical Care and Sleep Medicine  Consult Note      Admit Date:  5/8/2018    MRN:   [id number]        Patient:        PCP:   Savi Garvey MD    CONSULT : Post Op Respiratory Evaluation    HISTORY OF PRESENT ILLNESS: Dorathy Patch 68 y.o. male was seen in consultation regarding the above issue. HISTORY OF PRESENT ILLNESS:  The patient is a 58-year-old male who presented with substernal chest pain at home, primarily with exertion. The pain has increased in intensity and frequency. In assocation with the pain was the development of shortness of breath. Because of the patient's risk factors, he underwent cardiac catheterization today which showed 60-70% distal left main stenosis as well as an 80% ostial circumflex stenosis. The patient had mild disease in the LAD and the right coronary artery. The patient's left ventriculogram showed near normal ventricular function. He had normal Spirometry and DLCO. He was transferred for surgical evaluation. On 5/8/2018 he underwent (anderson)  CABG x2 with left internal mammary artery to the LAD and reverse saphenous vein graft to the first marginal branch of the circumflex. Post pump ejection fraction was 50% with no valvular abnormalities. Post operative CXR was compared to preoperative assessment. The left hemidiaphragm was elevated. There was a new infiltrate ? In the LLL area and pulmonary was asked to evaluation. PAST MEDICAL HISTORY:   has a past medical history of BPH (benign prostatic hyperplasia); CAD (coronary artery disease); Diabetes mellitus ([facility] [address] and Hyperlipidemia. SURGICAL HISTORY:   has a past surgical history that includes Colonoscopy; Cholecystectomy; Appendectomy; Tonsillectomy; Knee arthroscopy (Right); Cardiac catheterization (04/26/2018); and pr cabg, arterial, four+ (N/A, 5/8/2018). SOCIAL HISTORY:   reports that he has never smoked.  He has never used smokeless tobacco. He reports that he does not drink alcohol or use drugs. FAMILY  HISTORY:  family history includes Cancer in his mother; Diabetes in his father; Early Death in his father; High Blood Pressure in his brother. MEDICATIONS:   Prior to Admission medications    Medication Sig Start Date End Date Taking? Authorizing Provider   nitroGLYCERIN (NITROSTAT) 0.3 MG SL tablet up to max of 3 total doses. If no relief after 1 dose, call 911. 5/5/18  Yes Debria Nageotte, DO   dutasteride (AVODART) 0.5 MG capsule Take 0.5 mg by mouth daily   Yes Historical Provider, MD   metoprolol succinate (TOPROL XL) 25 MG extended release tablet Take 25 mg by mouth daily   Yes Historical Provider, MD   aspirin 81 MG chewable tablet Take 81 mg by mouth daily   Yes Historical Provider, MD   tamsulosin (FLOMAX) 0.4 MG capsule Take 0.4 mg by mouth 2 times daily    Yes Historical Provider, MD   atorvastatin (LIPITOR) 40 MG tablet Take 40 mg by mouth daily. Yes Historical Provider, MD   glipiZIDE (GLIPIZIDE XL) 10 MG CR tablet Take 10 mg by mouth daily. Yes Historical Provider, MD   metFORMIN (GLUCOPHAGE) 1000 MG tablet Take 1,000 mg by mouth 2 times daily (with meals). Yes Historical Provider, MD       ALLERGIES:  Patient has no known allergies. REVIEW OF SYSTEMS:  Unable to obtain as the patient in intubated and sedated op operatively.      OBJECTIVE:     PHYSICAL EXAM:   VITALS:   Patient Vitals for the past 8 hrs:   BP Temp Temp src Pulse Resp SpO2   05/08/18 1500 - 99.3 °F (37.4 °C) CORE 104 26 100 %   05/08/18 1455 - - - 100 14 100 %   05/08/18 1432 (!) 88/49 - - 94 18 99 %   05/08/18 1431 - - - 97 18 93 %   05/08/18 1410 107/68 - - 100 18 100 %   05/08/18 1400 - 98 °F (36.7 °C) CORE 89 18 100 %   05/08/18 1300 - 98 °F (36.7 °C) - 76 18 100 %   05/08/18 1250 - - - 71 18 100 %   05/08/18 1200 - 98 °F (36.7 °C) CORE 83 18 100 %   05/08/18 1145 - 98 °F (36.7 °C) - 57 18 100 %   05/08/18 1130 - - - 71 18 100 % Suspected underlying mild central pulmonary vascular congestion. 3. Distal NG tube not well identified. 4. Suspected underlying mild central pulmonary vascular congestion    1. Suspected underlying mild central pulmonary vascular congestion. 2. Left basilar infiltrate/pneumonia left pleural effusion. 3. Interval placement of right-sided internal jugular line introducer, endotracheal tube, median sternotomy wires, left-sided chest tube and mediastinal drains as described above. 4. The NG tube is not well identified. The distal tips not seen. FINDINGS:  Spirometry and diffusion capacity are normal with a forced vital capacity of 3.39 liters, 97% of predicted with. FEV1 of 2.81 liters, 95% of predicted with an FEV1/FVC ratio of 83%. Expiratory time is 6.42 seconds with diffusion capacity 96% of predicted. Us Dup Lower Extremity Mapping Bilat Venous  Result Date: 5/4/2018  Bilateral lower extremity venous mapping as detailed above. Us Carotid Artery Bilateral    Result Date: 5/4/2018  Carotid Doppler ultrasound demonstrating no evidence of hemodynamically significant stenosis involving the carotid or vertebral arteries bilaterally, with peak systolic and end-diastolic velocities as above. Assessment  1. Post Op Respiratory Failure  2. LLL mucous plug  3. Bronchitis  4. Collapsible airway? YANG  5. CAD  6. Elevated left hemidiaphragm  7. Plan  1. Bronchoscopy  2. Bronchodialators   3. Add gm (-) coverage with rocephin   4. Could use some diuresis  5. Wean to extubate  6.  Check bronchoscopy cultures    Monika Linton DO, MPH, FCCP, Didier Clarke, FACP,

## 2018-05-08 NOTE — PROGRESS NOTES
Pt admitted to same day surgery. Pt alert/oriented x3. Respirations non-labored. Denies chest pain/shortness of breath. Skin warm/dry. Denies rash/open wounds. No distress noted. Call light in reach. Will continue to monitor.   Rigoberto Jiménez, JOHN

## 2018-05-09 ENCOUNTER — APPOINTMENT (OUTPATIENT)
Dept: GENERAL RADIOLOGY | Age: 73
DRG: 236 | End: 2018-05-09
Attending: THORACIC SURGERY (CARDIOTHORACIC VASCULAR SURGERY)
Payer: MEDICARE

## 2018-05-09 ENCOUNTER — ANESTHESIA (OUTPATIENT)
Dept: OPERATING ROOM | Age: 73
DRG: 236 | End: 2018-05-09
Payer: MEDICARE

## 2018-05-09 ENCOUNTER — ANESTHESIA EVENT (OUTPATIENT)
Dept: OPERATING ROOM | Age: 73
DRG: 236 | End: 2018-05-09
Payer: MEDICARE

## 2018-05-09 VITALS — OXYGEN SATURATION: 95 % | TEMPERATURE: 98.6 F | RESPIRATION RATE: 15 BRPM

## 2018-05-09 PROBLEM — D62 ACUTE BLOOD LOSS ANEMIA: Status: ACTIVE | Noted: 2018-05-09

## 2018-05-09 PROBLEM — I95.9 HYPOTENSION: Status: ACTIVE | Noted: 2018-05-09

## 2018-05-09 LAB
AADO2: 156.4 MMHG
AADO2: 277.2 MMHG
ALBUMIN SERPL-MCNC: 3 G/DL (ref 3.5–5.2)
ALP BLD-CCNC: 33 U/L (ref 40–129)
ALT SERPL-CCNC: 13 U/L (ref 0–40)
ANGLE (CLOT STRENGTH): 68.3 DEGREE (ref 59–74)
ANION GAP SERPL CALCULATED.3IONS-SCNC: 11 MMOL/L (ref 7–16)
ANION GAP SERPL CALCULATED.3IONS-SCNC: 13 MMOL/L (ref 7–16)
APTT: 29.4 SEC (ref 24.5–35.1)
AST SERPL-CCNC: 26 U/L (ref 0–39)
B.E.: -0.2 MMOL/L (ref -3–3)
B.E.: -1.2 MMOL/L (ref -3–3)
B.E.: -3.2 MMOL/L (ref -3–0)
B.E.: -7.8 MMOL/L (ref -3–0)
B.E.: 0.5 MMOL/L (ref -3–3)
BILIRUB SERPL-MCNC: 0.7 MG/DL (ref 0–1.2)
BLOOD BANK DISPENSE STATUS: NORMAL
BLOOD BANK PRODUCT CODE: NORMAL
BPU ID: NORMAL
BUN BLDV-MCNC: 13 MG/DL (ref 8–23)
BUN BLDV-MCNC: 13 MG/DL (ref 8–23)
CALCIUM SERPL-MCNC: 7.6 MG/DL (ref 8.6–10.2)
CALCIUM SERPL-MCNC: 7.6 MG/DL (ref 8.6–10.2)
CARDIOPULMONARY BYPASS: YES
CARDIOPULMONARY BYPASS: YES
CHLORIDE BLD-SCNC: 104 MMOL/L (ref 98–107)
CHLORIDE BLD-SCNC: 105 MMOL/L (ref 98–107)
CO2: 20 MMOL/L (ref 22–29)
CO2: 22 MMOL/L (ref 22–29)
COHB: 1.2 % (ref 0–1.5)
COHB: 1.3 % (ref 0–1.5)
COHB: 1.5 % (ref 0–1.5)
COMMENT: ABNORMAL
CREAT SERPL-MCNC: 0.8 MG/DL (ref 0.7–1.2)
CREAT SERPL-MCNC: 0.8 MG/DL (ref 0.7–1.2)
CRITICAL: ABNORMAL
DATE ANALYZED: ABNORMAL
DATE OF COLLECTION: ABNORMAL
DESCRIPTION BLOOD BANK: NORMAL
DEVICE: ABNORMAL
DEVICE: ABNORMAL
EPL-TEG: 0.2 % (ref 0–15)
FIO2: 40 %
FIO2: 60 %
G-TEG: 7.7 K D/SC (ref 4.5–11)
GFR AFRICAN AMERICAN: >60
GFR AFRICAN AMERICAN: >60
GFR NON-AFRICAN AMERICAN: >60 ML/MIN/1.73
GFR NON-AFRICAN AMERICAN: >60 ML/MIN/1.73
GLUCOSE BLD-MCNC: 215 MG/DL (ref 74–109)
GLUCOSE BLD-MCNC: 222 MG/DL (ref 74–109)
GRAM STAIN ORDERABLE: NORMAL
HCO3 ARTERIAL: 19.1 MMOL/L (ref 22–26)
HCO3 ARTERIAL: 21.5 MMOL/L (ref 22–26)
HCO3: 24.2 MMOL/L (ref 22–26)
HCO3: 24.3 MMOL/L (ref 22–26)
HCO3: 24.8 MMOL/L (ref 22–26)
HCT VFR BLD CALC: 20.2 % (ref 37–54)
HCT VFR BLD CALC: 26.2 % (ref 37–54)
HCT,ARTERIAL: 25 % (ref 37–54)
HCT,ARTERIAL: 26 % (ref 37–54)
HEMOGLOBIN: 6.9 G/DL (ref 12.5–16.5)
HEMOGLOBIN: 9 G/DL (ref 12.5–16.5)
HGB, ARTERIAL: 8.4 G/DL (ref 12.5–15.5)
HGB, ARTERIAL: 8.9 G/DL (ref 12.5–15.5)
HHB: 2.8 % (ref 0–5)
HHB: 3.9 % (ref 0–5)
HHB: 4.7 % (ref 0–5)
INR BLD: 1.3
K (CLOTTING TIME): 1.6 MIN (ref 1–3)
LAB: ABNORMAL
LY30 (FIBRINOLYSIS): 0.2 % (ref 0–8)
Lab: 1120
Lab: 1241
Lab: 1347
MA (MAX AMPLITUDE): 60.7 MM (ref 50–70)
MAGNESIUM: 2 MG/DL (ref 1.6–2.6)
MCH RBC QN AUTO: 31.8 PG (ref 26–35)
MCH RBC QN AUTO: 32.7 PG (ref 26–35)
MCHC RBC AUTO-ENTMCNC: 34.2 % (ref 32–34.5)
MCHC RBC AUTO-ENTMCNC: 34.4 % (ref 32–34.5)
MCV RBC AUTO: 92.6 FL (ref 80–99.9)
MCV RBC AUTO: 95.7 FL (ref 80–99.9)
METER GLUCOSE: 108 MG/DL (ref 70–110)
METER GLUCOSE: 110 MG/DL (ref 70–110)
METER GLUCOSE: 126 MG/DL (ref 70–110)
METER GLUCOSE: 130 MG/DL (ref 70–110)
METER GLUCOSE: 142 MG/DL (ref 70–110)
METER GLUCOSE: 153 MG/DL (ref 70–110)
METER GLUCOSE: 154 MG/DL (ref 70–110)
METER GLUCOSE: 160 MG/DL (ref 70–110)
METER GLUCOSE: 173 MG/DL (ref 70–110)
METER GLUCOSE: 173 MG/DL (ref 70–110)
METER GLUCOSE: 179 MG/DL (ref 70–110)
METER GLUCOSE: 206 MG/DL (ref 70–110)
METER GLUCOSE: 207 MG/DL (ref 70–110)
METER GLUCOSE: 218 MG/DL (ref 70–110)
METER GLUCOSE: 219 MG/DL (ref 70–110)
METER GLUCOSE: 219 MG/DL (ref 70–110)
METER GLUCOSE: 226 MG/DL (ref 70–110)
METER GLUCOSE: 229 MG/DL (ref 70–110)
METER GLUCOSE: 237 MG/DL (ref 70–110)
METHB: 0.2 % (ref 0–1.5)
METHB: 0.3 % (ref 0–1.5)
METHB: 0.4 % (ref 0–1.5)
MODE: ABNORMAL
MODE: ABNORMAL
MODE: AC
O2 SATURATION: 95.2 % (ref 92–98.5)
O2 SATURATION: 95.8 % (ref 92–98.5)
O2 SATURATION: 96 % (ref 92–98.5)
O2 SATURATION: 97.2 % (ref 92–98.5)
O2 SATURATION: 97.7 % (ref 92–98.5)
O2HB: 93.7 % (ref 94–97)
O2HB: 94.4 % (ref 94–97)
O2HB: 95.6 % (ref 94–97)
OPERATOR ID: ABNORMAL
PATIENT TEMP: 37 C
PCO2 ARTERIAL: 36.6 MMHG (ref 35–45)
PCO2 ARTERIAL: 44.2 MMHG (ref 35–45)
PCO2: 38.7 MMHG (ref 35–45)
PCO2: 39.2 MMHG (ref 35–45)
PCO2: 44 MMHG (ref 35–45)
PDW BLD-RTO: 12.8 FL (ref 11.5–15)
PDW BLD-RTO: 14.6 FL (ref 11.5–15)
PEEP/CPAP: 5 CMH?O
PEEP/CPAP: 5 CMH?O
PFO2: 1.47 MMHG/%
PFO2: 1.87 MMHG/%
PH BLOOD GAS: 7.24 (ref 7.35–7.45)
PH BLOOD GAS: 7.36 (ref 7.35–7.45)
PH BLOOD GAS: 7.38 (ref 7.35–7.45)
PH BLOOD GAS: 7.41 (ref 7.35–7.45)
PH BLOOD GAS: 7.42 (ref 7.35–7.45)
PLATELET # BLD: 128 E9/L (ref 130–450)
PLATELET # BLD: 131 E9/L (ref 130–450)
PMV BLD AUTO: 10 FL (ref 7–12)
PMV BLD AUTO: 11.2 FL (ref 7–12)
PO2 ARTERIAL: 116.3 MMHG (ref 60–80)
PO2 ARTERIAL: 81.9 MMHG (ref 60–80)
PO2: 71.7 MMHG (ref 60–100)
PO2: 74.7 MMHG (ref 60–100)
PO2: 88.4 MMHG (ref 60–100)
POTASSIUM SERPL-SCNC: 3.82 MMOL/L (ref 3.3–5.1)
POTASSIUM SERPL-SCNC: 3.9 MMOL/L (ref 3.3–5.1)
POTASSIUM SERPL-SCNC: 3.98 MMOL/L (ref 3.3–5.1)
POTASSIUM SERPL-SCNC: 4 MMOL/L (ref 3.5–5.5)
POTASSIUM SERPL-SCNC: 4.1 MMOL/L (ref 3.5–5.5)
POTASSIUM SERPL-SCNC: 4.3 MMOL/L (ref 3.5–5)
POTASSIUM SERPL-SCNC: 4.4 MMOL/L (ref 3.5–5)
PROTHROMBIN TIME: 14.9 SEC (ref 9.3–12.4)
PS: 8 CMH20
R (REACTION TIME): 5.1 MIN (ref 5–10)
RBC # BLD: 2.11 E12/L (ref 3.8–5.8)
RBC # BLD: 2.83 E12/L (ref 3.8–5.8)
RI(T): 209 %
RI(T): 314 %
RR MECHANICAL: 12 B/MIN
SODIUM BLD-SCNC: 137 MMOL/L (ref 132–146)
SODIUM BLD-SCNC: 138 MMOL/L (ref 132–146)
SOURCE, BLOOD GAS: ABNORMAL
THB: 8.8 G/DL (ref 11.5–16.5)
THB: 9.2 G/DL (ref 11.5–16.5)
THB: 9.7 G/DL (ref 11.5–16.5)
TIME ANALYZED: 1123
TIME ANALYZED: 1246
TIME ANALYZED: 1351
TOTAL PROTEIN: 4.6 G/DL (ref 6.4–8.3)
VT MECHANICAL: 550 ML
WBC # BLD: 12.6 E9/L (ref 4.5–11.5)
WBC # BLD: 13 E9/L (ref 4.5–11.5)

## 2018-05-09 PROCEDURE — 85730 THROMBOPLASTIN TIME PARTIAL: CPT

## 2018-05-09 PROCEDURE — 82805 BLOOD GASES W/O2 SATURATION: CPT

## 2018-05-09 PROCEDURE — 36415 COLL VENOUS BLD VENIPUNCTURE: CPT

## 2018-05-09 PROCEDURE — 94770 HC ETCO2 MONITOR DAILY: CPT

## 2018-05-09 PROCEDURE — 85027 COMPLETE CBC AUTOMATED: CPT

## 2018-05-09 PROCEDURE — 2500000003 HC RX 250 WO HCPCS: Performed by: THORACIC SURGERY (CARDIOTHORACIC VASCULAR SURGERY)

## 2018-05-09 PROCEDURE — 82962 GLUCOSE BLOOD TEST: CPT

## 2018-05-09 PROCEDURE — 2140000000 HC CCU INTERMEDIATE R&B

## 2018-05-09 PROCEDURE — 3700000000 HC ANESTHESIA ATTENDED CARE: Performed by: THORACIC SURGERY (CARDIOTHORACIC VASCULAR SURGERY)

## 2018-05-09 PROCEDURE — 85610 PROTHROMBIN TIME: CPT

## 2018-05-09 PROCEDURE — P9016 RBC LEUKOCYTES REDUCED: HCPCS

## 2018-05-09 PROCEDURE — 84132 ASSAY OF SERUM POTASSIUM: CPT

## 2018-05-09 PROCEDURE — 6360000002 HC RX W HCPCS

## 2018-05-09 PROCEDURE — 94640 AIRWAY INHALATION TREATMENT: CPT

## 2018-05-09 PROCEDURE — 85576 BLOOD PLATELET AGGREGATION: CPT

## 2018-05-09 PROCEDURE — 94150 VITAL CAPACITY TEST: CPT

## 2018-05-09 PROCEDURE — 0WCC0ZZ EXTIRPATION OF MATTER FROM MEDIASTINUM, OPEN APPROACH: ICD-10-PCS | Performed by: THORACIC SURGERY (CARDIOTHORACIC VASCULAR SURGERY)

## 2018-05-09 PROCEDURE — P9035 PLATELET PHERES LEUKOREDUCED: HCPCS

## 2018-05-09 PROCEDURE — 6370000000 HC RX 637 (ALT 250 FOR IP): Performed by: THORACIC SURGERY (CARDIOTHORACIC VASCULAR SURGERY)

## 2018-05-09 PROCEDURE — 86923 COMPATIBILITY TEST ELECTRIC: CPT

## 2018-05-09 PROCEDURE — 35820 EXPLORE CHEST VESSELS: CPT | Performed by: THORACIC SURGERY (CARDIOTHORACIC VASCULAR SURGERY)

## 2018-05-09 PROCEDURE — 36430 TRANSFUSION BLD/BLD COMPNT: CPT

## 2018-05-09 PROCEDURE — 6360000002 HC RX W HCPCS: Performed by: THORACIC SURGERY (CARDIOTHORACIC VASCULAR SURGERY)

## 2018-05-09 PROCEDURE — 83735 ASSAY OF MAGNESIUM: CPT

## 2018-05-09 PROCEDURE — 6360000002 HC RX W HCPCS: Performed by: INTERNAL MEDICINE

## 2018-05-09 PROCEDURE — 36591 DRAW BLOOD OFF VENOUS DEVICE: CPT

## 2018-05-09 PROCEDURE — 71045 X-RAY EXAM CHEST 1 VIEW: CPT

## 2018-05-09 PROCEDURE — 82803 BLOOD GASES ANY COMBINATION: CPT

## 2018-05-09 PROCEDURE — 2500000003 HC RX 250 WO HCPCS

## 2018-05-09 PROCEDURE — 85384 FIBRINOGEN ACTIVITY: CPT

## 2018-05-09 PROCEDURE — 6370000000 HC RX 637 (ALT 250 FOR IP): Performed by: NURSE PRACTITIONER

## 2018-05-09 PROCEDURE — 85347 COAGULATION TIME ACTIVATED: CPT

## 2018-05-09 PROCEDURE — 99232 SBSQ HOSP IP/OBS MODERATE 35: CPT | Performed by: INTERNAL MEDICINE

## 2018-05-09 PROCEDURE — 2000000000 HC ICU R&B

## 2018-05-09 PROCEDURE — 2580000003 HC RX 258: Performed by: THORACIC SURGERY (CARDIOTHORACIC VASCULAR SURGERY)

## 2018-05-09 PROCEDURE — 6360000002 HC RX W HCPCS: Performed by: NURSE PRACTITIONER

## 2018-05-09 PROCEDURE — P9059 PLASMA, FRZ BETWEEN 8-24HOUR: HCPCS

## 2018-05-09 PROCEDURE — 3600000008 HC SURGERY OHS BASE: Performed by: THORACIC SURGERY (CARDIOTHORACIC VASCULAR SURGERY)

## 2018-05-09 PROCEDURE — 3700000001 HC ADD 15 MINUTES (ANESTHESIA): Performed by: THORACIC SURGERY (CARDIOTHORACIC VASCULAR SURGERY)

## 2018-05-09 PROCEDURE — 80053 COMPREHEN METABOLIC PANEL: CPT

## 2018-05-09 PROCEDURE — 3600000018 HC SURGERY OHS ADDTL 15MIN: Performed by: THORACIC SURGERY (CARDIOTHORACIC VASCULAR SURGERY)

## 2018-05-09 PROCEDURE — 2580000003 HC RX 258: Performed by: INTERNAL MEDICINE

## 2018-05-09 PROCEDURE — 94002 VENT MGMT INPAT INIT DAY: CPT

## 2018-05-09 PROCEDURE — 99291 CRITICAL CARE FIRST HOUR: CPT | Performed by: NURSE PRACTITIONER

## 2018-05-09 PROCEDURE — B246ZZ4 ULTRASONOGRAPHY OF RIGHT AND LEFT HEART, TRANSESOPHAGEAL: ICD-10-PCS | Performed by: THORACIC SURGERY (CARDIOTHORACIC VASCULAR SURGERY)

## 2018-05-09 PROCEDURE — 80048 BASIC METABOLIC PNL TOTAL CA: CPT

## 2018-05-09 RX ORDER — INSULIN GLARGINE 100 [IU]/ML
22 INJECTION, SOLUTION SUBCUTANEOUS NIGHTLY
Status: DISCONTINUED | OUTPATIENT
Start: 2018-05-09 | End: 2018-05-13 | Stop reason: HOSPADM

## 2018-05-09 RX ORDER — DEXAMETHASONE SODIUM PHOSPHATE 10 MG/ML
INJECTION INTRAMUSCULAR; INTRAVENOUS PRN
Status: DISCONTINUED | OUTPATIENT
Start: 2018-05-09 | End: 2018-05-09 | Stop reason: SDUPTHER

## 2018-05-09 RX ORDER — ETOMIDATE 2 MG/ML
INJECTION INTRAVENOUS PRN
Status: DISCONTINUED | OUTPATIENT
Start: 2018-05-09 | End: 2018-05-09 | Stop reason: SDUPTHER

## 2018-05-09 RX ORDER — MIDAZOLAM HYDROCHLORIDE 5 MG/ML
INJECTION INTRAMUSCULAR; INTRAVENOUS PRN
Status: DISCONTINUED | OUTPATIENT
Start: 2018-05-09 | End: 2018-05-09 | Stop reason: SDUPTHER

## 2018-05-09 RX ORDER — LIDOCAINE HYDROCHLORIDE 20 MG/ML
INJECTION, SOLUTION INFILTRATION; PERINEURAL PRN
Status: DISCONTINUED | OUTPATIENT
Start: 2018-05-09 | End: 2018-05-09 | Stop reason: SDUPTHER

## 2018-05-09 RX ORDER — 0.9 % SODIUM CHLORIDE 0.9 %
250 INTRAVENOUS SOLUTION INTRAVENOUS ONCE
Status: DISCONTINUED | OUTPATIENT
Start: 2018-05-09 | End: 2018-05-11

## 2018-05-09 RX ORDER — FENTANYL CITRATE 50 UG/ML
INJECTION, SOLUTION INTRAMUSCULAR; INTRAVENOUS
Status: DISPENSED
Start: 2018-05-09 | End: 2018-05-10

## 2018-05-09 RX ORDER — FENTANYL CITRATE 0.05 MG/ML
INJECTION, SOLUTION INTRAMUSCULAR; INTRAVENOUS PRN
Status: DISCONTINUED | OUTPATIENT
Start: 2018-05-09 | End: 2018-05-09 | Stop reason: SDUPTHER

## 2018-05-09 RX ORDER — CEFAZOLIN SODIUM 1 G/3ML
INJECTION, POWDER, FOR SOLUTION INTRAMUSCULAR; INTRAVENOUS PRN
Status: DISCONTINUED | OUTPATIENT
Start: 2018-05-09 | End: 2018-05-09 | Stop reason: SDUPTHER

## 2018-05-09 RX ORDER — VECURONIUM BROMIDE 1 MG/ML
INJECTION, POWDER, LYOPHILIZED, FOR SOLUTION INTRAVENOUS PRN
Status: DISCONTINUED | OUTPATIENT
Start: 2018-05-09 | End: 2018-05-09 | Stop reason: SDUPTHER

## 2018-05-09 RX ADMIN — IPRATROPIUM BROMIDE AND ALBUTEROL SULFATE 1 AMPULE: 2.5; .5 SOLUTION RESPIRATORY (INHALATION) at 11:29

## 2018-05-09 RX ADMIN — FENTANYL CITRATE 25 MCG: 50 INJECTION INTRAMUSCULAR; INTRAVENOUS at 01:27

## 2018-05-09 RX ADMIN — ATORVASTATIN CALCIUM 40 MG: 40 TABLET, FILM COATED ORAL at 21:42

## 2018-05-09 RX ADMIN — TAMSULOSIN HYDROCHLORIDE 0.4 MG: 0.4 CAPSULE ORAL at 21:42

## 2018-05-09 RX ADMIN — INSULIN LISPRO 3 UNITS: 100 INJECTION, SOLUTION INTRAVENOUS; SUBCUTANEOUS at 04:46

## 2018-05-09 RX ADMIN — IPRATROPIUM BROMIDE AND ALBUTEROL SULFATE 1 AMPULE: 2.5; .5 SOLUTION RESPIRATORY (INHALATION) at 20:46

## 2018-05-09 RX ADMIN — MUPIROCIN: 20 OINTMENT TOPICAL at 21:41

## 2018-05-09 RX ADMIN — BUDESONIDE 500 MCG: 0.5 SUSPENSION RESPIRATORY (INHALATION) at 20:46

## 2018-05-09 RX ADMIN — FENTANYL CITRATE 25 MCG: 50 INJECTION INTRAMUSCULAR; INTRAVENOUS at 19:46

## 2018-05-09 RX ADMIN — IPRATROPIUM BROMIDE AND ALBUTEROL SULFATE 1 AMPULE: 2.5; .5 SOLUTION RESPIRATORY (INHALATION) at 15:39

## 2018-05-09 RX ADMIN — WATER 1 G: 1 INJECTION INTRAMUSCULAR; INTRAVENOUS; SUBCUTANEOUS at 18:15

## 2018-05-09 RX ADMIN — FENTANYL CITRATE 250 MCG: 50 INJECTION, SOLUTION INTRAMUSCULAR; INTRAVENOUS at 09:00

## 2018-05-09 RX ADMIN — FENTANYL CITRATE 250 MCG: 50 INJECTION, SOLUTION INTRAMUSCULAR; INTRAVENOUS at 08:35

## 2018-05-09 RX ADMIN — CEFAZOLIN SODIUM 2 G: 2 SOLUTION INTRAVENOUS at 21:42

## 2018-05-09 RX ADMIN — CEFAZOLIN SODIUM 2 G: 2 SOLUTION INTRAVENOUS at 06:13

## 2018-05-09 RX ADMIN — CALCIUM GLUCONATE 1 G: 98 INJECTION, SOLUTION INTRAVENOUS at 08:00

## 2018-05-09 RX ADMIN — Medication 10 ML: at 21:44

## 2018-05-09 RX ADMIN — SODIUM BICARBONATE 50 MEQ: 84 INJECTION INTRAVENOUS at 09:05

## 2018-05-09 RX ADMIN — FENTANYL CITRATE 25 MCG: 50 INJECTION INTRAMUSCULAR; INTRAVENOUS at 12:57

## 2018-05-09 RX ADMIN — PROPOFOL 20 MCG/KG/MIN: 10 INJECTION, EMULSION INTRAVENOUS at 10:41

## 2018-05-09 RX ADMIN — FENTANYL CITRATE 25 MCG: 50 INJECTION INTRAMUSCULAR; INTRAVENOUS at 08:08

## 2018-05-09 RX ADMIN — CEFAZOLIN 2000 MG: 1 INJECTION, POWDER, FOR SOLUTION INTRAVENOUS at 08:30

## 2018-05-09 RX ADMIN — ETOMIDATE 5 MG: 2 INJECTION, SOLUTION INTRAVENOUS at 08:35

## 2018-05-09 RX ADMIN — VECURONIUM BROMIDE 10 MG: 1 INJECTION, POWDER, LYOPHILIZED, FOR SOLUTION INTRAVENOUS at 08:35

## 2018-05-09 RX ADMIN — INSULIN GLARGINE 22 UNITS: 100 INJECTION, SOLUTION SUBCUTANEOUS at 21:44

## 2018-05-09 RX ADMIN — FENTANYL CITRATE 25 MCG: 50 INJECTION INTRAMUSCULAR; INTRAVENOUS at 04:20

## 2018-05-09 RX ADMIN — SODIUM CHLORIDE 8 UNITS: 9 INJECTION, SOLUTION INTRAVENOUS at 14:58

## 2018-05-09 RX ADMIN — LIDOCAINE HYDROCHLORIDE 100 MG: 20 INJECTION, SOLUTION INFILTRATION; PERINEURAL at 08:35

## 2018-05-09 RX ADMIN — VASOPRESSIN 0.05 UNITS/MIN: 20 INJECTION INTRAVENOUS at 07:03

## 2018-05-09 RX ADMIN — VECURONIUM BROMIDE 6 MG: 1 INJECTION, POWDER, LYOPHILIZED, FOR SOLUTION INTRAVENOUS at 09:00

## 2018-05-09 RX ADMIN — MIDAZOLAM HYDROCHLORIDE 2 MG: 5 INJECTION INTRAMUSCULAR; INTRAVENOUS at 08:30

## 2018-05-09 RX ADMIN — CEFAZOLIN SODIUM 2 G: 2 SOLUTION INTRAVENOUS at 15:03

## 2018-05-09 RX ADMIN — DEXAMETHASONE SODIUM PHOSPHATE 10 MG: 10 INJECTION INTRAMUSCULAR; INTRAVENOUS at 08:30

## 2018-05-09 RX ADMIN — FENTANYL CITRATE 25 MCG: 50 INJECTION INTRAMUSCULAR; INTRAVENOUS at 13:50

## 2018-05-09 RX ADMIN — FENTANYL CITRATE 25 MCG: 50 INJECTION INTRAMUSCULAR; INTRAVENOUS at 15:05

## 2018-05-09 RX ADMIN — MUPIROCIN: 20 OINTMENT TOPICAL at 11:19

## 2018-05-09 RX ADMIN — FENTANYL CITRATE 25 MCG: 50 INJECTION INTRAMUSCULAR; INTRAVENOUS at 18:15

## 2018-05-09 ASSESSMENT — PAIN DESCRIPTION - ORIENTATION
ORIENTATION: MID

## 2018-05-09 ASSESSMENT — PULMONARY FUNCTION TESTS
PIF_VALUE: 21
PIF_VALUE: 25
PIF_VALUE: 21
PIF_VALUE: 21
PIF_VALUE: 25
PIF_VALUE: 22
PIF_VALUE: 25
PIF_VALUE: 19
PIF_VALUE: 19
PIF_VALUE: 1
PIF_VALUE: 20
PIF_VALUE: 21
PIF_VALUE: 25
PIF_VALUE: 26
PIF_VALUE: 21
PIF_VALUE: 22
PIF_VALUE: 2
PIF_VALUE: 21
PIF_VALUE: 20
PIF_VALUE: 21
PIF_VALUE: 20
PIF_VALUE: 0
PIF_VALUE: 26
PIF_VALUE: 25
PIF_VALUE: 20
PIF_VALUE: 23
PIF_VALUE: 26
PIF_VALUE: 20
PIF_VALUE: 25
PIF_VALUE: 21
PIF_VALUE: 25
PIF_VALUE: 25
PIF_VALUE: 36
PIF_VALUE: 0
PIF_VALUE: 25
PIF_VALUE: 26
PIF_VALUE: 0
PIF_VALUE: 25
PIF_VALUE: 20
PIF_VALUE: 25
PIF_VALUE: 22
PIF_VALUE: 25
PIF_VALUE: 23
PIF_VALUE: 25
PIF_VALUE: 25
PIF_VALUE: 20
PIF_VALUE: 25
PIF_VALUE: 25
PIF_VALUE: 26
PIF_VALUE: 25
PIF_VALUE: 22
PIF_VALUE: 21
PIF_VALUE: 25
PIF_VALUE: 25
PIF_VALUE: 27
PIF_VALUE: 5
PIF_VALUE: 22
PIF_VALUE: 20
PIF_VALUE: 22
PIF_VALUE: 25
PIF_VALUE: 22
PIF_VALUE: 21
PIF_VALUE: 0
PIF_VALUE: 19
PIF_VALUE: 19
PIF_VALUE: 21
PIF_VALUE: 25
PIF_VALUE: 0
PIF_VALUE: 22
PIF_VALUE: 21
PIF_VALUE: 25
PIF_VALUE: 19
PIF_VALUE: 25
PIF_VALUE: 25
PIF_VALUE: 9
PIF_VALUE: 25
PIF_VALUE: 25
PIF_VALUE: 21
PIF_VALUE: 19
PIF_VALUE: 19
PIF_VALUE: 22
PIF_VALUE: 25
PIF_VALUE: 25
PIF_VALUE: 5
PIF_VALUE: 21
PIF_VALUE: 25
PIF_VALUE: 19
PIF_VALUE: 0
PIF_VALUE: 25
PIF_VALUE: 20
PIF_VALUE: 22
PIF_VALUE: 20
PIF_VALUE: 25
PIF_VALUE: 25
PIF_VALUE: 21
PIF_VALUE: 24
PIF_VALUE: 19
PIF_VALUE: 20
PIF_VALUE: 0
PIF_VALUE: 21
PIF_VALUE: 26
PIF_VALUE: 28
PIF_VALUE: 26
PIF_VALUE: 25
PIF_VALUE: 14
PIF_VALUE: 25
PIF_VALUE: 5
PIF_VALUE: 0
PIF_VALUE: 34
PIF_VALUE: 22
PIF_VALUE: 25
PIF_VALUE: 21
PIF_VALUE: 25
PIF_VALUE: 19
PIF_VALUE: 0
PIF_VALUE: 35
PIF_VALUE: 25

## 2018-05-09 ASSESSMENT — PAIN SCALES - GENERAL
PAINLEVEL_OUTOF10: 0
PAINLEVEL_OUTOF10: 8
PAINLEVEL_OUTOF10: 8
PAINLEVEL_OUTOF10: 7
PAINLEVEL_OUTOF10: 3
PAINLEVEL_OUTOF10: 0
PAINLEVEL_OUTOF10: 7
PAINLEVEL_OUTOF10: 5
PAINLEVEL_OUTOF10: 6
PAINLEVEL_OUTOF10: 0
PAINLEVEL_OUTOF10: 7
PAINLEVEL_OUTOF10: 3
PAINLEVEL_OUTOF10: 0
PAINLEVEL_OUTOF10: 3
PAINLEVEL_OUTOF10: 0
PAINLEVEL_OUTOF10: 6
PAINLEVEL_OUTOF10: 0

## 2018-05-09 ASSESSMENT — PAIN DESCRIPTION - FREQUENCY
FREQUENCY: INTERMITTENT
FREQUENCY: CONTINUOUS
FREQUENCY: INTERMITTENT

## 2018-05-09 ASSESSMENT — PAIN DESCRIPTION - PAIN TYPE
TYPE: SURGICAL PAIN
TYPE: SURGICAL PAIN;ACUTE PAIN
TYPE: SURGICAL PAIN

## 2018-05-09 ASSESSMENT — PAIN DESCRIPTION - LOCATION
LOCATION: CHEST
LOCATION: GENERALIZED;INCISION;CHEST

## 2018-05-09 ASSESSMENT — PAIN DESCRIPTION - ONSET
ONSET: ON-GOING
ONSET: GRADUAL

## 2018-05-09 ASSESSMENT — PAIN DESCRIPTION - PROGRESSION
CLINICAL_PROGRESSION: GRADUALLY WORSENING

## 2018-05-09 ASSESSMENT — PAIN DESCRIPTION - DESCRIPTORS
DESCRIPTORS: ACHING
DESCRIPTORS: SHARP;ACHING
DESCRIPTORS: ACHING
DESCRIPTORS: ACHING
DESCRIPTORS: ACHING;DISCOMFORT;SORE
DESCRIPTORS: ACHING

## 2018-05-09 ASSESSMENT — ENCOUNTER SYMPTOMS: SHORTNESS OF BREATH: 1

## 2018-05-09 NOTE — PROGRESS NOTES
Patient was extubated to 5 liters/min via nasal cannula. Stridor was not present post extubation. SPO2 was 96%. Patient was suctioned pre and post extubation. Cuff deflated prior to extubation.       Performed by  Tequila Subramanian

## 2018-05-09 NOTE — PLAN OF CARE
Problem: Pain:  Goal: Control of acute pain  Control of acute pain   Outcome: Met This Shift  Medicate as ordered reposition pt as melissa

## 2018-05-09 NOTE — PLAN OF CARE
Problem: Cardiac Output - Decreased:  Goal: Cardiac output within specified parameters  Cardiac output within specified parameters   Outcome: Met This Shift  Monitor vital signs titrate medication per orders monitor fluid balance monitor h/h adm blood as ordered    Problem: Gas Exchange - Impaired:  Goal: Ability to maintain adequate ventilation will improve  Ability to maintain adequate ventilation will improve   Outcome: Met This Shift  Monitor cxr and breathe sounds encourage pt to cough and deep breath encourage smi monitor sao2

## 2018-05-09 NOTE — PROGRESS NOTES
CVICU Progress Note    Name: Jude Smith  MRN: [id number]    Indication for Surgery/Procedure: CAD  LVEF:  Normal    RVF:  Normal      Important/Relevant PMH/PSH: HTN, HPL, DM, BPH      Procedure/Surgeries: 5/8/2018 CABG x 2 (LIMA to LAD, SVG to OM1)  Pacing wires: Atrial and Ventricular        Intake/Output Summary (Last 24 hours) at 05/09/18 0758  Last data filed at 05/09/18 0700   Gross per 24 hour   Intake             4487 ml   Output             4561 ml   Net              -74 ml       Recent Labs      05/08/18   1040  05/09/18   0440   WBC  12.9*  13.0*   HGB  13.1  6.9*   HCT  36.8*  20.2*   PLT  149  128*      Recent Labs      05/08/18   1040  05/09/18   0440   BUN  12  13   CREATININE  0.8  0.8       Physical Exam:    BP (!) 100/48   Pulse 105   Temp 99.5 °F (37.5 °C)   Resp 27   Ht 5' 8\" (1.727 m)   Wt 198 lb 3.1 oz (89.9 kg)   SpO2 96%   BMI 30.14 kg/m²       CXR Findings:   Impression   ALERT:  THIS IS AN ABNORMAL REPORT   1. Increased enlargement of the cardiomediastinal silhouette when   compared with the previous exam, particularly in the left hilar   region. . The possibility of underlying pericardial effusion or other   cardiac abnormalities is not excluded on the basis of this exam in   this postoperative patient, clinical correlation recommended. 2. Left basilar infiltrate/pneumonia left pleural effusion. 3. Interval removal of endotracheal tube and NG tube. 4. Possible underlying pulmonary edema.       CXR personally viewed and interpreted by ICU Nurse Practitioner, agree with above findings     General: Awake, alert. On vasopressin gtt. Pulmonary: Diminished bibasilar, left > right. No wheezes, no accessory muscle use noted   Cardiovascular:  RRR, no heaves or thrills on palpation  Abdomen: Soft, nontender, hypoactive BS   Extremities: Palpable pulses all extremities, no LE edema   Neurologic: A&Ox3, PARK to command   Skin: Warm and dry.  No rashes noted   Incisions: MSI with UGO dressing, LLE SVG site with ace wrap       Assessment/Plan: POD #1  1. CAD S/p CABG x 2 (LIMA to LAD, SVG to OM1)  - ASA, statin  - Increased chest tube output overnight with drop H/H- pt to return to OR for chest exploration this morning.   - Closely monitor CT output and hemodynamics when returns from OR       2. Respiratory Insufficiency  - 2/2 surgery  - Pulmonary consulted post op for CXR with LLL ? collapse, s/p bronch for increased secretions- f/u cultures, continue Abx, nebulizers   - Currently on 2 L O2 NC, tolerating well  - Plan to extubate as able when returns from OR   - PRN ABGs      3. Bleeding/acute blood loss anemia  - H/H 6.9/20.2 on CBC, transfusing PRBCs to maintain Hgb >8 for hemodynamic stability   - Left pleural alyson drain with ~1.5L output post op, CXR with increased cardiomegaly and left sided haziness   - Plan for return to OR this morning for exploration     4. Hypotension  - 2/2 blood loss  - Vasopressin infusion to maintain MAPs >65, IVF resuscitation as needed     5. Acute Post Operative Pain   - Appears comfortable, PRN medications for optimal pain management, adjust as needed      6. DM Type 2   - Hgb A1C 8.1%, on glipizide and metformin at home   - Blood glucose >200 past two readings, start RHI infusion for glycemic control per protocol        This patient has a high probability of sudden deterioration, which requires preparedness to urgently intervene. I participated in the decision making process and managed the direct care of the patient that required frequent assessment to treat or prevent the following:   Respiratory insufficiency  Bleeding/acute blood loss anemia  Hypotension  DM type 2  Acute post operative pain   ? I personally spent 32 minutes of critical care time treating the patient.        Dispo: CVICU    Electronically signed by KIM Jones CNP on 5/9/2018 at 7:58 AM

## 2018-05-09 NOTE — PROGRESS NOTES
Pulmonary [facility]    Department of Internal Medicine  Division of Pulmonary, Critical Care & Sleep Medicine    SUBJECTIVE:    Increse in drainage from alyson tube and low Hb  HB only 99 on vasopressors  To OR for reexploration    OBJECTIVE:  Vitals:    05/09/18 0700 05/09/18 0703 05/09/18 0715 05/09/18 0800   BP:  (!) 99/46 (!) 100/48    Pulse: 109 107 105    Resp: 30 22 27 21   Temp:  99.5 °F (37.5 °C) 99.5 °F (37.5 °C) 99.7 °F (37.6 °C)   TempSrc:    Core   SpO2: 95%  96%    Weight:       Height:         Constitutional: Alert, cooperative. EENT: EOMI EL. MMM. No icterus. No thrush. Neck: No thyromegaly. No elevated JVP. Trachea was midline. Respiratory: Symmetrical.  BS were clear. Cardiovascular: Regular, Sternotomy, Drains      Pulses:  Equal bilaterally. Abdomen: Soft without organomegaly. No rebound, rigidity. No guarding. Lymphatic: No lymphadenopathy. Musculoskeletal: Without weakness  Extremities:  + lower extremity edema. Reflexes appear adequate. Skin:  Warm and dry. No skin rashes. Neurological/Psychiatric: No acute psychosis. Cranial nerves II-XII are intact. DATA:    Monitor Strips:  Reviewed & discusses with technical team. No changes noted.     RADIOLOGY:  Films were read/reviewed/discussed with radiology shows haziness left lung      CBC:   Lab Results   Component Value Date    WBC 13.0 05/09/2018    RBC 2.11 05/09/2018    HGB 6.9 05/09/2018    HCT 20.2 05/09/2018    MCV 95.7 05/09/2018    MCH 32.7 05/09/2018    MCHC 34.2 05/09/2018    RDW 12.8 05/09/2018     05/09/2018    MPV 11.2 05/09/2018     CMP:    Lab Results   Component Value Date     05/09/2018    K 4.4 05/09/2018    K 4.2 05/04/2018     05/09/2018    CO2 20 05/09/2018    BUN 13 05/09/2018    CREATININE 0.8 05/09/2018    GFRAA >60 05/09/2018    LABGLOM >60 05/09/2018    GLUCOSE 215 05/09/2018    CALCIUM 7.6 05/09/2018     PT/INR:    Lab Results   Component Value Date

## 2018-05-09 NOTE — PROGRESS NOTES
Spontaneous Parameters performed    VT = 547 ml  f = 19  B/M  Ve = 11 L/M  NIF = -29  cmH2O  RSBI = 44      Performed by Shira Franklin

## 2018-05-09 NOTE — PROGRESS NOTES
Pt seen and examined  Increased CT output from alyson drain last night along with decreased Hb this am  CXR with haziness L chest this am  Will reexplore to r/o ongoing active bleeding  Discussed with pt

## 2018-05-09 NOTE — OP NOTE
[address]                   [address] [facility],  [facility]                                 OPERATIVE REPORT    PATIENT NAME: Jennifer Kiser                    :        1945  MED REC NO:   [id number]                            ROOM:       0241  ACCOUNT NO:   [de-identified]                           ADMIT DATE: 2018  PROVIDER:     Magda Harper DO    DATE OF PROCEDURE:  2018    PREOPERATIVE DIAGNOSIS:  Mediastinal hemorrhage, status post coronary  artery bypass grafting. POSTOPERATIVE DIAGNOSIS:  Mediastinal hemorrhage, status post coronary  artery bypass grafting. PROCEDURE:  Mediastinal re-exploration with control of bleeding, status  post coronary bypass grafting surgery. SURGEON:  Magda Harper DO    ASSISTANT:  Wanda Sherwood. INDICATIONS FOR OPERATION:  The patient is a 67-year-old male who underwent  uneventful bypass grafting yesterday. Over the course of the night, the  patient had a drop in hemoglobin and increasing chest tube output. It was  decided that the patient would benefit from re-exploration. PROCEDURE IN DETAIL:  The patient was taken to the operating room. After  adequate general anesthesia, the patient was prepped and draped in a  sterile fashion. The previous incision was opened and sutures removed. Sternal wires were removed. A sternal retractor was placed and the  mediastinum was entered. There was minimal clot around the heart. Transesophageal echo showed normal right and left ventricular function with  no significant tamponade. There was, however, a significant amount of clot  noted near the left hilum in the area of the course of the mammary artery  and the incision in the pericardium. All this clot was removed. Bloody  effusion was also noted which was removed via suction. Upon visual  inspection throughout the chest, there was no obvious venous bleeding.    Throughout the course of the re-exploration, there was minimal  reaccumulation of blood. Apparently, the venous bleeding appeared to have  stopped. The entire area and the entire mediastinum were inspected  carefully with no obvious bleeding site. Some small areas were bovied with  cautery. There was no obvious bleeding from the sternal wire sites. The  patient remained hemodynamically stable through this process. The bypass  grafts were patent. The chest tubes were placed back into the chest after  extensive exploration. The sternum was then reapproximated with six  sternal wires in a parasternal fashion. Fascia was reapproximated with  running #1 Vicryl suture in two layers and skin with a 4-0 Vicryl in  subcuticular fashion. Sterile dressing was applied. All sponge counts and  instrument counts were correct. The patient tolerated the procedure and  went to the CDU in stable condition.         Padmaja Cervantes DO    D: 05/09/2018 12:24:40       T: 05/09/2018 12:26:32     [id number]  Job#: [id number]     Doc#: [id number]    CC:

## 2018-05-09 NOTE — BRIEF OP NOTE
Brief Postoperative Note  ______________________________________________________________    Patient: Chato Paula  YOB: 1945  MRN: [id number]  Date of Procedure: 5/9/2018    Pre-Op Diagnosis: Mediastinal hemorrhage s/p CABG    Post-Op Diagnosis: Same       Procedure(s):  Mediastinal reexploration s/p CABG    Anesthesia: General    Surgeon(s):  Kellie Cisneros DO    Staff:  Scrub Person First: Ese Pacheco  Physician Assistant: Solis Zhu PA-C     Complications: none    Specimens:   clot    Implants:  none      Drains:   Chest Tube 1 Posterior Mediastinal 32 Latvian (Active)   Suction -20 cm H2O 5/9/2018  4:00 AM   Chest Tube Airleak No 5/9/2018  8:00 AM   Drainage Description Dark red 5/9/2018  8:00 AM   Dressing Status Clean;Dry; Intact 5/9/2018  8:00 AM   Dressing Type Dry dressing 5/9/2018  8:00 AM   Dressing Change Due 05/09/18 5/8/2018  9:30 PM   Site Assessment Clean;Dry; Intact 5/9/2018  8:00 AM   Surrounding Skin Dry; Intact 5/9/2018  8:00 AM   Patency Intervention Tip/Tilt 5/9/2018  8:00 AM   Output (ml) 0 ml 5/9/2018  7:00 AM       Chest Tube 2 Anterior Mediastinal 32 Latvian (Active)   Suction -20 cm H2O 5/9/2018  4:00 AM   Chest Tube Airleak No 5/9/2018  8:00 AM   Drainage Description Dark red 5/9/2018  8:00 AM   Dressing Status Clean;Dry; Intact 5/9/2018  8:00 AM   Dressing Type Dry dressing 5/9/2018  8:00 AM   Dressing Change Due 05/09/18 5/8/2018  9:30 PM   Site Assessment Clean;Dry; Intact 5/9/2018  8:00 AM   Surrounding Skin Dry; Intact 5/9/2018  8:00 AM   Patency Intervention Tip/Tilt 5/9/2018  8:00 AM   Output (ml) 20 ml 5/9/2018  8:00 AM       Chest Tube 3 Left Pleural 19 Latvian (Active)   Suction -20 cm H2O 5/9/2018  8:00 AM   Chest Tube Airleak No 5/9/2018  8:00 AM   Drainage Description Dark red 5/9/2018  8:00 AM   Dressing Status Clean;Dry; Intact 5/9/2018  8:00 AM   Dressing Type Dry dressing 5/9/2018  8:00 AM   Dressing Change Due 05/09/18 5/8/2018  9:30 PM   Site Assessment Clean;Dry; Intact 5/9/2018  8:00 AM   Surrounding Skin Dry; Intact 5/9/2018  8:00 AM   Patency Intervention Tip/Tilt 5/9/2018  8:00 AM   Output (ml) 100 ml 5/9/2018  8:00 AM       Urethral Catheter Temperature probe 16 fr (Active)   Catheter Indications Need for fluid management in critically ill patients in a critical care setting not able to be managed by other means such as BSC with hat, bedpan, urinal, condom catheter, or short term intermittent urethral catherization 5/9/2018  8:00 AM   Site Assessment No urethral drainage 5/9/2018  8:00 AM   Urine Color Yellow 5/9/2018  8:00 AM   Urine Appearance Clear 5/9/2018  8:00 AM   Output (mL) 88 mL 5/9/2018  8:00 AM       [REMOVED] NG/OG Tube (Removed)   Surrounding Skin Dry; Intact 5/8/2018  3:00 PM   Dressing Status Clean;Dry; Intact 5/8/2018  3:00 PM   Status Suction-low intermittent 5/8/2018  3:00 PM   Placement Verified by X-ray 5/8/2018 10:30 AM   NG/OG Measurement (cm) 60 cm 5/8/2018 10:30 AM   Drainage Appearance Clear 5/8/2018 10:30 AM   Output (mL) 0 ml 5/8/2018  3:00 PM       Findings: see op mamadou Horn DO  Date: 5/9/2018  Time: 10:11 AM

## 2018-05-09 NOTE — FLOWSHEET NOTE
Daughter Brain Garzon in [facility] waiting room updated on pt's condition mother and brother on way to hospital

## 2018-05-10 ENCOUNTER — APPOINTMENT (OUTPATIENT)
Dept: GENERAL RADIOLOGY | Age: 73
DRG: 236 | End: 2018-05-10
Attending: THORACIC SURGERY (CARDIOTHORACIC VASCULAR SURGERY)
Payer: MEDICARE

## 2018-05-10 PROBLEM — I95.9 HYPOTENSION: Status: RESOLVED | Noted: 2018-05-09 | Resolved: 2018-05-10

## 2018-05-10 PROBLEM — R58 HYPOTENSION DUE TO BLOOD LOSS: Status: ACTIVE | Noted: 2018-05-09

## 2018-05-10 PROBLEM — I95.89 HYPOTENSION DUE TO BLOOD LOSS: Status: ACTIVE | Noted: 2018-05-09

## 2018-05-10 LAB
ANION GAP SERPL CALCULATED.3IONS-SCNC: 9 MMOL/L (ref 7–16)
BLOOD BANK DISPENSE STATUS: NORMAL
BLOOD BANK DISPENSE STATUS: NORMAL
BLOOD BANK PRODUCT CODE: NORMAL
BLOOD BANK PRODUCT CODE: NORMAL
BPU ID: NORMAL
BPU ID: NORMAL
BUN BLDV-MCNC: 13 MG/DL (ref 8–23)
CALCIUM SERPL-MCNC: 7.7 MG/DL (ref 8.6–10.2)
CHLORIDE BLD-SCNC: 108 MMOL/L (ref 98–107)
CO2: 23 MMOL/L (ref 22–29)
CREAT SERPL-MCNC: 0.8 MG/DL (ref 0.7–1.2)
CULTURE, RESPIRATORY: NORMAL
DESCRIPTION BLOOD BANK: NORMAL
DESCRIPTION BLOOD BANK: NORMAL
GFR AFRICAN AMERICAN: >60
GFR NON-AFRICAN AMERICAN: >60 ML/MIN/1.73
GLUCOSE BLD-MCNC: 141 MG/DL (ref 74–109)
HCT VFR BLD CALC: 22.7 % (ref 37–54)
HCT VFR BLD CALC: 24.1 % (ref 37–54)
HCT VFR BLD CALC: 27.3 % (ref 37–54)
HEMOGLOBIN: 7.8 G/DL (ref 12.5–16.5)
HEMOGLOBIN: 8.2 G/DL (ref 12.5–16.5)
HEMOGLOBIN: 9.2 G/DL (ref 12.5–16.5)
MAGNESIUM: 2.1 MG/DL (ref 1.6–2.6)
MCH RBC QN AUTO: 31.6 PG (ref 26–35)
MCHC RBC AUTO-ENTMCNC: 34.4 % (ref 32–34.5)
MCV RBC AUTO: 91.9 FL (ref 80–99.9)
METER GLUCOSE: 108 MG/DL (ref 70–110)
METER GLUCOSE: 126 MG/DL (ref 70–110)
METER GLUCOSE: 140 MG/DL (ref 70–110)
METER GLUCOSE: 145 MG/DL (ref 70–110)
METER GLUCOSE: 186 MG/DL (ref 70–110)
METER GLUCOSE: 193 MG/DL (ref 70–110)
METER GLUCOSE: 268 MG/DL (ref 70–110)
METER GLUCOSE: 328 MG/DL (ref 70–110)
METER GLUCOSE: 51 MG/DL (ref 70–110)
PDW BLD-RTO: 15.5 FL (ref 11.5–15)
PLATELET # BLD: 137 E9/L (ref 130–450)
PMV BLD AUTO: 10.8 FL (ref 7–12)
POTASSIUM SERPL-SCNC: 3.9 MMOL/L (ref 3.5–5)
RBC # BLD: 2.47 E12/L (ref 3.8–5.8)
SMEAR, RESPIRATORY: NORMAL
SODIUM BLD-SCNC: 140 MMOL/L (ref 132–146)
WBC # BLD: 13.7 E9/L (ref 4.5–11.5)

## 2018-05-10 PROCEDURE — P9046 ALBUMIN (HUMAN), 25%, 20 ML: HCPCS

## 2018-05-10 PROCEDURE — 83735 ASSAY OF MAGNESIUM: CPT

## 2018-05-10 PROCEDURE — 2580000003 HC RX 258: Performed by: THORACIC SURGERY (CARDIOTHORACIC VASCULAR SURGERY)

## 2018-05-10 PROCEDURE — 93308 TTE F-UP OR LMTD: CPT

## 2018-05-10 PROCEDURE — 82962 GLUCOSE BLOOD TEST: CPT

## 2018-05-10 PROCEDURE — 6360000002 HC RX W HCPCS

## 2018-05-10 PROCEDURE — 6360000002 HC RX W HCPCS: Performed by: NURSE PRACTITIONER

## 2018-05-10 PROCEDURE — 80048 BASIC METABOLIC PNL TOTAL CA: CPT

## 2018-05-10 PROCEDURE — P9016 RBC LEUKOCYTES REDUCED: HCPCS

## 2018-05-10 PROCEDURE — 99232 SBSQ HOSP IP/OBS MODERATE 35: CPT | Performed by: INTERNAL MEDICINE

## 2018-05-10 PROCEDURE — 71045 X-RAY EXAM CHEST 1 VIEW: CPT

## 2018-05-10 PROCEDURE — 94150 VITAL CAPACITY TEST: CPT

## 2018-05-10 PROCEDURE — 6360000002 HC RX W HCPCS: Performed by: THORACIC SURGERY (CARDIOTHORACIC VASCULAR SURGERY)

## 2018-05-10 PROCEDURE — 2000000000 HC ICU R&B

## 2018-05-10 PROCEDURE — 2140000000 HC CCU INTERMEDIATE R&B

## 2018-05-10 PROCEDURE — 36415 COLL VENOUS BLD VENIPUNCTURE: CPT

## 2018-05-10 PROCEDURE — 85027 COMPLETE CBC AUTOMATED: CPT

## 2018-05-10 PROCEDURE — 99233 SBSQ HOSP IP/OBS HIGH 50: CPT | Performed by: NURSE PRACTITIONER

## 2018-05-10 PROCEDURE — 6370000000 HC RX 637 (ALT 250 FOR IP): Performed by: THORACIC SURGERY (CARDIOTHORACIC VASCULAR SURGERY)

## 2018-05-10 PROCEDURE — 36430 TRANSFUSION BLD/BLD COMPNT: CPT

## 2018-05-10 PROCEDURE — 94640 AIRWAY INHALATION TREATMENT: CPT

## 2018-05-10 PROCEDURE — 85014 HEMATOCRIT: CPT

## 2018-05-10 PROCEDURE — 6370000000 HC RX 637 (ALT 250 FOR IP): Performed by: NURSE PRACTITIONER

## 2018-05-10 PROCEDURE — 2580000003 HC RX 258: Performed by: INTERNAL MEDICINE

## 2018-05-10 PROCEDURE — 2580000003 HC RX 258: Performed by: NURSE PRACTITIONER

## 2018-05-10 PROCEDURE — 6360000002 HC RX W HCPCS: Performed by: INTERNAL MEDICINE

## 2018-05-10 PROCEDURE — 85018 HEMOGLOBIN: CPT

## 2018-05-10 RX ORDER — FUROSEMIDE 10 MG/ML
20 INJECTION INTRAMUSCULAR; INTRAVENOUS ONCE
Status: COMPLETED | OUTPATIENT
Start: 2018-05-10 | End: 2018-05-10

## 2018-05-10 RX ORDER — AMIODARONE HYDROCHLORIDE 200 MG/1
400 TABLET ORAL 2 TIMES DAILY
Status: DISCONTINUED | OUTPATIENT
Start: 2018-05-10 | End: 2018-05-13 | Stop reason: HOSPADM

## 2018-05-10 RX ORDER — ALBUMIN (HUMAN) 12.5 G/50ML
SOLUTION INTRAVENOUS
Status: COMPLETED
Start: 2018-05-10 | End: 2018-05-10

## 2018-05-10 RX ORDER — FUROSEMIDE 10 MG/ML
20 INJECTION INTRAMUSCULAR; INTRAVENOUS ONCE
Status: DISCONTINUED | OUTPATIENT
Start: 2018-05-10 | End: 2018-05-10

## 2018-05-10 RX ORDER — ALBUMIN (HUMAN) 12.5 G/50ML
25 SOLUTION INTRAVENOUS ONCE
Status: COMPLETED | OUTPATIENT
Start: 2018-05-10 | End: 2018-05-10

## 2018-05-10 RX ADMIN — INSULIN LISPRO 3 UNITS: 100 INJECTION, SOLUTION INTRAVENOUS; SUBCUTANEOUS at 17:51

## 2018-05-10 RX ADMIN — TAMSULOSIN HYDROCHLORIDE 0.4 MG: 0.4 CAPSULE ORAL at 07:59

## 2018-05-10 RX ADMIN — ASPIRIN 81 MG: 81 TABLET, COATED ORAL at 07:59

## 2018-05-10 RX ADMIN — AMIODARONE HYDROCHLORIDE 400 MG: 200 TABLET ORAL at 20:23

## 2018-05-10 RX ADMIN — FENTANYL CITRATE 25 MCG: 50 INJECTION INTRAMUSCULAR; INTRAVENOUS at 07:56

## 2018-05-10 RX ADMIN — OXYCODONE HYDROCHLORIDE AND ACETAMINOPHEN 2 TABLET: 5; 325 TABLET ORAL at 18:59

## 2018-05-10 RX ADMIN — WATER 1 G: 1 INJECTION INTRAMUSCULAR; INTRAVENOUS; SUBCUTANEOUS at 17:23

## 2018-05-10 RX ADMIN — ALBUMIN (HUMAN) 25 G: 12.5 SOLUTION INTRAVENOUS at 11:05

## 2018-05-10 RX ADMIN — FENTANYL CITRATE 25 MCG: 50 INJECTION INTRAMUSCULAR; INTRAVENOUS at 02:17

## 2018-05-10 RX ADMIN — INSULIN LISPRO 3 UNITS: 100 INJECTION, SOLUTION INTRAVENOUS; SUBCUTANEOUS at 02:15

## 2018-05-10 RX ADMIN — ALBUMIN (HUMAN) 25 G: 0.25 INJECTION, SOLUTION INTRAVENOUS at 11:05

## 2018-05-10 RX ADMIN — OXYCODONE HYDROCHLORIDE AND ACETAMINOPHEN 2 TABLET: 5; 325 TABLET ORAL at 10:14

## 2018-05-10 RX ADMIN — ONDANSETRON 4 MG: 2 INJECTION INTRAMUSCULAR; INTRAVENOUS at 01:30

## 2018-05-10 RX ADMIN — Medication 10 ML: at 09:14

## 2018-05-10 RX ADMIN — BUDESONIDE 500 MCG: 0.5 SUSPENSION RESPIRATORY (INHALATION) at 20:12

## 2018-05-10 RX ADMIN — PHENYLEPHRINE HYDROCHLORIDE 100 MCG/MIN: 10 INJECTION, SOLUTION INTRAMUSCULAR; INTRAVENOUS; SUBCUTANEOUS at 13:35

## 2018-05-10 RX ADMIN — INSULIN LISPRO 9 UNITS: 100 INJECTION, SOLUTION INTRAVENOUS; SUBCUTANEOUS at 13:58

## 2018-05-10 RX ADMIN — BUDESONIDE 500 MCG: 0.5 SUSPENSION RESPIRATORY (INHALATION) at 08:18

## 2018-05-10 RX ADMIN — INSULIN LISPRO 3 UNITS: 100 INJECTION, SOLUTION INTRAVENOUS; SUBCUTANEOUS at 21:28

## 2018-05-10 RX ADMIN — Medication 10 ML: at 20:23

## 2018-05-10 RX ADMIN — ATORVASTATIN CALCIUM 40 MG: 40 TABLET, FILM COATED ORAL at 20:23

## 2018-05-10 RX ADMIN — IPRATROPIUM BROMIDE AND ALBUTEROL SULFATE 1 AMPULE: 2.5; .5 SOLUTION RESPIRATORY (INHALATION) at 11:52

## 2018-05-10 RX ADMIN — AMIODARONE HYDROCHLORIDE 400 MG: 200 TABLET ORAL at 09:20

## 2018-05-10 RX ADMIN — IPRATROPIUM BROMIDE AND ALBUTEROL SULFATE 1 AMPULE: 2.5; .5 SOLUTION RESPIRATORY (INHALATION) at 15:55

## 2018-05-10 RX ADMIN — FENTANYL CITRATE 25 MCG: 50 INJECTION INTRAMUSCULAR; INTRAVENOUS at 21:33

## 2018-05-10 RX ADMIN — INSULIN GLARGINE 22 UNITS: 100 INJECTION, SOLUTION SUBCUTANEOUS at 21:28

## 2018-05-10 RX ADMIN — INSULIN LISPRO 3 UNITS: 100 INJECTION, SOLUTION INTRAVENOUS; SUBCUTANEOUS at 06:02

## 2018-05-10 RX ADMIN — INSULIN LISPRO 12 UNITS: 100 INJECTION, SOLUTION INTRAVENOUS; SUBCUTANEOUS at 09:36

## 2018-05-10 RX ADMIN — MAGNESIUM GLUCONATE 500 MG ORAL TABLET 400 MG: 500 TABLET ORAL at 07:59

## 2018-05-10 RX ADMIN — MUPIROCIN: 20 OINTMENT TOPICAL at 20:23

## 2018-05-10 RX ADMIN — OXYCODONE HYDROCHLORIDE AND ACETAMINOPHEN 2 TABLET: 5; 325 TABLET ORAL at 23:05

## 2018-05-10 RX ADMIN — TAMSULOSIN HYDROCHLORIDE 0.4 MG: 0.4 CAPSULE ORAL at 20:23

## 2018-05-10 RX ADMIN — FENTANYL CITRATE 25 MCG: 50 INJECTION INTRAMUSCULAR; INTRAVENOUS at 15:32

## 2018-05-10 RX ADMIN — CALCIUM GLUCONATE 1 G: 98 INJECTION, SOLUTION INTRAVENOUS at 07:38

## 2018-05-10 RX ADMIN — METOPROLOL TARTRATE 25 MG: 25 TABLET ORAL at 08:00

## 2018-05-10 RX ADMIN — IPRATROPIUM BROMIDE AND ALBUTEROL SULFATE 1 AMPULE: 2.5; .5 SOLUTION RESPIRATORY (INHALATION) at 08:18

## 2018-05-10 RX ADMIN — FENTANYL CITRATE 25 MCG: 50 INJECTION INTRAMUSCULAR; INTRAVENOUS at 05:13

## 2018-05-10 RX ADMIN — IPRATROPIUM BROMIDE AND ALBUTEROL SULFATE 1 AMPULE: 2.5; .5 SOLUTION RESPIRATORY (INHALATION) at 20:12

## 2018-05-10 RX ADMIN — FUROSEMIDE 20 MG: 10 INJECTION, SOLUTION INTRAVENOUS at 11:54

## 2018-05-10 RX ADMIN — MUPIROCIN: 20 OINTMENT TOPICAL at 08:00

## 2018-05-10 ASSESSMENT — PAIN DESCRIPTION - DESCRIPTORS
DESCRIPTORS: DULL;ACHING
DESCRIPTORS: ACHING;DISCOMFORT;DULL
DESCRIPTORS: ACHING;DULL;SORE
DESCRIPTORS: ACHING;DISCOMFORT;DULL
DESCRIPTORS: SORE;DISCOMFORT
DESCRIPTORS: ACHING;DULL;DISCOMFORT

## 2018-05-10 ASSESSMENT — PAIN SCALES - GENERAL
PAINLEVEL_OUTOF10: 2
PAINLEVEL_OUTOF10: 8
PAINLEVEL_OUTOF10: 7
PAINLEVEL_OUTOF10: 0
PAINLEVEL_OUTOF10: 8
PAINLEVEL_OUTOF10: 7
PAINLEVEL_OUTOF10: 8
PAINLEVEL_OUTOF10: 8
PAINLEVEL_OUTOF10: 7
PAINLEVEL_OUTOF10: 8

## 2018-05-10 ASSESSMENT — PAIN DESCRIPTION - FREQUENCY
FREQUENCY: CONTINUOUS
FREQUENCY: INTERMITTENT
FREQUENCY: INTERMITTENT
FREQUENCY: CONTINUOUS

## 2018-05-10 ASSESSMENT — PAIN DESCRIPTION - PROGRESSION: CLINICAL_PROGRESSION: GRADUALLY WORSENING

## 2018-05-10 ASSESSMENT — PAIN DESCRIPTION - PAIN TYPE
TYPE: SURGICAL PAIN

## 2018-05-10 ASSESSMENT — PAIN DESCRIPTION - LOCATION
LOCATION: GENERALIZED
LOCATION: CHEST
LOCATION: INCISION;CHEST

## 2018-05-10 ASSESSMENT — PAIN DESCRIPTION - ONSET: ONSET: GRADUAL

## 2018-05-10 NOTE — PLAN OF CARE
Problem:  Activity Intolerance:  Goal: Able to perform prescribed physical activity  Able to perform prescribed physical activity   Outcome: Met This Shift    Goal: Ability to tolerate increased activity will improve  Ability to tolerate increased activity will improve   Outcome: Met This Shift      Problem: Anxiety:  Goal: Level of anxiety will decrease  Level of anxiety will decrease   Outcome: Met This Shift      Problem: Cardiac Output - Decreased:  Goal: Hemodynamic stability will improve  Hemodynamic stability will improve   Outcome: Met This Shift      Problem: Fluid Volume - Imbalance:  Goal: Ability to achieve a balanced intake and output will improve  Ability to achieve a balanced intake and output will improve   Outcome: Met This Shift      Problem: Tissue Perfusion - Cardiopulmonary, Altered:  Goal: Absence of angina  Absence of angina   Outcome: Met This Shift    Goal: Hemodynamic stability will improve  Hemodynamic stability will improve   Outcome: Met This Shift    Goal: Will show no evidence of cardiac arrhythmias  Will show no evidence of cardiac arrhythmias   Outcome: Met This Shift

## 2018-05-10 NOTE — PROGRESS NOTES
P Quality Flow/Interdisciplinary Rounds Progress Note        Quality Flow Rounds held on May 10, 2018    Disciplines Attending:  Nursing Unit Leadership, Bedside RN, APRN-BHUPENDRA Mariel Beltran was admitted on 5/8/2018  5:08 AM    Anticipated Discharge Date:  Expected Discharge Date: 05/09/18    Disposition:    Valeriy Score:  Valeriy Scale Score: 18    Readmission Risk              Risk of Unplanned Readmission:        12             Discussed patient goal for the day, patient clinical progression, and barriers to discharge.   The following Goal(s) of the Day/Commitment(s) have been identified:  Possible transfer to [place] [id number]  May 10, 2018

## 2018-05-10 NOTE — PLAN OF CARE
Problem:  Activity Intolerance:  Goal: Able to perform prescribed physical activity  Able to perform prescribed physical activity   Outcome: Met This Shift    Goal: Ability to tolerate increased activity will improve  Ability to tolerate increased activity will improve   Outcome: Met This Shift      Problem: Anxiety:  Goal: Level of anxiety will decrease  Level of anxiety will decrease   Outcome: Met This Shift      Problem: Cardiac Output - Decreased:  Goal: Cardiac output within specified parameters  Cardiac output within specified parameters   Outcome: Met This Shift    Goal: Hemodynamic stability will improve  Hemodynamic stability will improve   Outcome: Met This Shift      Problem: Fluid Volume - Imbalance:  Goal: Ability to achieve a balanced intake and output will improve  Ability to achieve a balanced intake and output will improve   Outcome: Met This Shift    Goal: Chest tube drainage is within specified parameters  Chest tube drainage is within specified parameters   Outcome: Met This Shift      Problem: Gas Exchange - Impaired:  Goal: Levels of oxygenation will improve  Levels of oxygenation will improve   Outcome: Met This Shift    Goal: Ability to maintain adequate ventilation will improve  Ability to maintain adequate ventilation will improve   Outcome: Met This Shift      Problem: Pain:  Goal: Control of acute pain  Control of acute pain   Outcome: Met This Shift      Problem: Tissue Perfusion - Cardiopulmonary, Altered:  Goal: Absence of angina  Absence of angina   Outcome: Met This Shift    Goal: Hemodynamic stability will improve  Hemodynamic stability will improve   Outcome: Met This Shift    Goal: Will show no evidence of cardiac arrhythmias  Will show no evidence of cardiac arrhythmias   Outcome: Met This Shift

## 2018-05-10 NOTE — PROGRESS NOTES
PROGRESS  NOTE -- INTERNAL AND GERIATRIC MEDICINE  Jefferson Dixon MD, FACP                                                               SUBJECTIVE:  Lux Dempsey  is alert, awake, and cooperative. Denies severe chest pain, nausea, dyspnea, emesis. Has controlled incisional pain   No abdominal pain. Tolerating diet. He is post ACB and re-entry for mediastinal bleeding    ROS:  Negative to a 10 system review except that mentioned in the HPI. Pertinent items are noted in HPI.          DIET:Dietary Nutrition Supplements: Diabetic Oral Supplement  DIET CARDIAC; Carb Control: 3 carb choices (45 gms)/meal    No Known Allergies    MEDICATION SIDE EFFECTS:none    SCHEDULED MEDS:   Current Facility-Administered Medications   Medication Dose Route Frequency Provider Last Rate Last Dose    amiodarone (CORDARONE) tablet 400 mg  400 mg Oral BID Rena Abraham APRN - CNP   400 mg at 05/10/18 0920    metoprolol tartrate (LOPRESSOR) tablet 12.5 mg  12.5 mg Oral BID Aga Brody, APRN - CNP        phenylephrine (KATELYN-SYNEPHRINE) 50 mg in dextrose 5 % 250 mL infusion  100 mcg/min Intravenous Continuous Aga Brody, APRN - CNP 15 mL/hr at 05/10/18 1454 50 mcg/min at 05/10/18 1454    perflutren lipid microspheres (DEFINITY) injection 1.65 mg  1.5 mL Intravenous ONCE PRN Aga Brody, APRN - CNP        0.9 % sodium chloride bolus  250 mL Intravenous Once Deward Horn, DO        insulin glargine (LANTUS) injection vial 22 Units  22 Units Subcutaneous Nightly Aga Brody, APRN - CNP   22 Units at 05/09/18 2144    atorvastatin (LIPITOR) tablet 40 mg  40 mg Oral Daily Deward Horn, DO   40 mg at 05/09/18 2142    tamsulosin (FLOMAX) capsule 0.4 mg  0.4 mg Oral BID Deward Horn, DO   0.4 mg at 05/10/18 0759    0.9 % sodium chloride infusion  30 mL/hr Intravenous Continuous Deward Horn, DO 35 mL/hr at 05/09/18 2000 35 mL/hr at 05/09/18 2000    sodium chloride flush 0.9 % injection 10 mL  10 mL Intravenous 2 times per day Jerelyn Real, DO   10 mL at 05/10/18 0914    sodium chloride flush 0.9 % injection 10 mL  10 mL Intravenous PRN Jerelyn Real, DO        potassium chloride 20 mEq/50 mL IVPB (Central Line)  20 mEq Intravenous PRN Jerelyn Real, DO   Stopped at 05/08/18 1819    magnesium sulfate 2 g in 50 mL IVPB premix  2 g Intravenous PRN Jerelyn Real, DO        acetaminophen (TYLENOL) tablet 650 mg  650 mg Oral Q4H PRN Jerelyn Real, DO        oxyCODONE-acetaminophen (PERCOCET) 5-325 MG per tablet 1 tablet  1 tablet Oral Q4H PRN Jerelyn Real, DO        Or    oxyCODONE-acetaminophen (PERCOCET) 5-325 MG per tablet 2 tablet  2 tablet Oral Q4H PRN Jerelyn Real, DO   2 tablet at 05/10/18 1014    docusate sodium (COLACE) capsule 100 mg  100 mg Oral BID PRN Jerelyn Real, DO        ondansetron [facility]) injection 4 mg  4 mg Intravenous Q8H PRN Jerelyn Real, DO   4 mg at 05/10/18 0130    aspirin EC tablet 81 mg  81 mg Oral Daily Jerelyn Real, DO   81 mg at 05/10/18 0759    albumin human 5 % bottle 25 g  25 g Intravenous PRN Jerelyn Real, DO   25 g at 05/08/18 1448    0.9 % sodium chloride bolus  250 mL Intravenous Continuous PRN Jerelyn Real, DO   Stopped at 05/08/18 2030    phenylephrine (KATELYN-SYNEPHRINE) 50 mg in dextrose 5 % 250 mL infusion  10 mcg/min Intravenous Continuous PRN Jerelyn Real, DO        norepinephrine (LEVOPHED) 16 mg in dextrose 5 % 250 mL infusion  2 mcg/min Intravenous Continuous PRN Jerelyn Real, DO   Stopped at 05/09/18 0025    clevidipine (CLEVIPREX) infusion  2 mg/hr Intravenous Continuous PRN Jerelyn Real, DO        insulin regular (HUMULIN R;NOVOLIN R) 100 Units in sodium chloride 0.9 % 100 mL infusion  1 Units/hr Intravenous Continuous PRN Jerelyn Real, DO   Stopped at 05/10/18 0100    glucose (GLUTOSE) 40 % oral gel 15 g  15 g Oral PRN Jerelyn Real, DO        dextrose 50 % solution 12.5 g  12.5 g Intravenous PRN Justino Primrose, DO        glucagon (rDNA) injection 1 mg  1 mg Intramuscular PRN Justino Primrose, DO        dextrose 5 % solution  100 mL/hr Intravenous PRN Justino Primrose, DO        ipratropium-albuterol (DUONEB) nebulizer solution 1 ampule  1 ampule Inhalation Q4H WA Justino Primrose, DO   1 ampule at 05/10/18 1152    insulin lispro (HUMALOG) injection vial 0-18 Units  0-18 Units Subcutaneous Q4H Justino Primrose, DO   9 Units at 05/10/18 1358    fentaNYL (SUBLIMAZE) injection 25 mcg  25 mcg Intravenous Q1H PRN Justino Primrose, DO   25 mcg at 05/10/18 1532    mupirocin (BACTROBAN) 2 % ointment   Nasal BID Justino Primrose, DO        magnesium oxide (MAG-OX) tablet 400 mg  400 mg Oral Daily Justino Primrose, DO   400 mg at 05/10/18 0759    acetaminophen (TYLENOL) suppository 650 mg  650 mg Rectal Q4H PRN Justino Primrose, DO        calcium gluconate 1 g in dextrose 5 % 50 mL IVPB  1 g Intravenous PRN Justino Primrose, DO   Stopped at 05/10/18 0900    propofol 1000 MG/100ML injection  10 mcg/kg/min Intravenous Titrated Airam Doles, APRN - CNP   Stopped at 05/09/18 1155    cefTRIAXone (ROCEPHIN) 1 g in sterile water 10 mL IV syringe  1 g Intravenous Q24H Ricky Border Adrienne, DO   1 g at 05/09/18 1815    budesonide (PULMICORT) nebulizer suspension 500 mcg  0.5 mg Nebulization BID [facility], DO   500 mcg at 05/10/18 0818    vasopressin 20 Units in dextrose 5 % 100 mL infusion  0.02 Units/min Intravenous Continuous Justino Primrose, DO 12 mL/hr at 05/09/18 0809 0.04 Units/min at 05/09/18 0809       I have reviewed the patient's medications; see Medication Reconciliation.     Current  Infusions   phenylephrine (KATELYN-SYNEPHRINE) 50mg/250mL infusion 50 mcg/min (05/10/18 1454)    sodium chloride 35 mL/hr (05/09/18 2000)    sodium chloride Stopped (05/08/18 2030)    phenylephrine (KATELYN-SYNEPHRINE) 50mg/250mL infusion      norepinephrine Stopped (05/09/18 0025)    clevidipine      insulin (HUMAN R) non-weight based infusion Stopped (05/10/18 0100)    dextrose      propofol Stopped (05/09/18 1155)    vasopressin infusion 0.04 Units/min (05/09/18 0809)       Prn Medsperflutren lipid microspheres, sodium chloride flush, potassium chloride, magnesium sulfate, acetaminophen, oxyCODONE-acetaminophen **OR** oxyCODONE-acetaminophen, docusate sodium, ondansetron, albumin human, sodium chloride, phenylephrine (KATELYN-SYNEPHRINE) 50mg/250mL infusion, norepinephrine, clevidipine, insulin (HUMAN R) non-weight based infusion, glucose, dextrose, glucagon (rDNA), dextrose, fentanNYL, acetaminophen, calcium gluconate IVPB     OBJECTIVE:  Wt Readings from Last 3 Encounters:   05/10/18 232 lb 5.8 oz (105.4 kg)   05/05/18 220 lb (99.8 kg)   05/04/18 221 lb (100.2 kg)     Temp Readings from Last 3 Encounters:   05/10/18 98.8 °F (37.1 °C) (Axillary)   05/09/18 98.6 °F (37 °C)   05/08/18 98.2 °F (36.8 °C)     BP Readings from Last 3 Encounters:   05/10/18 (!) 85/54   05/08/18 116/70   05/05/18 119/74     Pulse Readings from Last 3 Encounters:   05/10/18 99   05/05/18 75   05/04/18 78     @LASTSAO2(3)@    General appearance: alert, appears stated age and cooperative  Skin: Skin color, texture, turgor normal. No rashes or lesions  HEENT: Head: Normocephalic, no lesions, without obvious abnormality. Neck: no adenopathy, supple, symmetrical, trachea midline and thyroid not enlarged, symmetric, no tenderness/mass/nodules  Lungs: clear to auscultation bilaterally and diminished breath sounds bibasilar  Heart: S1S2 rapid but regular.  There is a loud murmur or friction rub cycled with cardiac rhythm  Abdomen: soft, non-tender; bowel sounds normal; no masses,  no organomegaly  Extremities: extremities normal, atraumatic, no cyanosis or edema  Neurologic:  Alert, oriented, thought content appropriate    CBC:   Lab Results   Component Value Date    WBC 13.7 05/10/2018    RBC 2.47 05/10/2018    HGB 8.2 05/10/2018    HCT 24.1 05/10/2018    MCV 91.9 05/10/2018    MCH 31.6 05/10/2018    MCHC 34.4 05/10/2018    RDW 15.5 05/10/2018     05/10/2018    MPV 10.8 05/10/2018     Hemoglobin/Hematocrit:    Lab Results   Component Value Date    HGB 8.2 05/10/2018    HCT 24.1 05/10/2018       TELEMETRY: REVIEWED--Telemetry: Sinus    RADIOLOGY: REVIEWED AVAILABLE REPORT  XR CHEST PORTABLE   Final Result   1. Since examination dated 05/09/2018, interval removal of the   endotracheal tube and nasogastric tube. 2. Relatively unchanged left-sided pleural effusion with associated   patchy left lower lobe airspace opacity. Recommend clinical   correlation. 3. Unchanged remaining postoperative lines and tubes as described   above. XR CHEST PORTABLE   Final Result   1. Right-sided central line/introducer. 2. Stable, enlarged cardiomediastinal silhouette. .   3. Left basilar infiltrate/pneumonia left pleural effusion. 4. Interval placement of mediastinal drains, left-sided chest tube,   endotracheal tube and NG tube as described above. 5. Suspected underlying mild central pulmonary vascular congestion. XR CHEST PORTABLE   Final Result   ALERT:  THIS IS AN ABNORMAL REPORT   1. Increased enlargement of the cardiomediastinal silhouette when   compared with the previous exam, particularly in the left hilar   region. . The possibility of underlying pericardial effusion or other   cardiac abnormalities is not excluded on the basis of this exam in   this postoperative patient, clinical correlation recommended. 2. Left basilar infiltrate/pneumonia left pleural effusion. 3. Interval removal of endotracheal tube and NG tube. 4. Possible underlying pulmonary edema. XR CHEST PORTABLE   Final Result   1. Left basilar infiltrate/pneumonia left pleural effusion. 2. Suspected underlying mild central pulmonary vascular congestion. 3. Distal NG tube not well identified.    4. Suspected underlying mild central pulmonary vascular congestion      XR CHEST PORTABLE Final Result   1. Suspected underlying mild central pulmonary vascular congestion. 2. Left basilar infiltrate/pneumonia left pleural effusion. 3. Interval placement of right-sided internal jugular line introducer,   endotracheal tube, median sternotomy wires, left-sided chest tube and   mediastinal drains as described above. 4. The NG tube is not well identified. The distal tips not seen. XR CHEST PORTABLE    (Results Pending)       CONSULTANT NOTES AND RECOMMENDATIONS REVIEWED:    ASSESSMENT:  S/P CABG  Friction rub vs Mumur apex up to the base 3/6  Patient Active Problem List   Diagnosis    CAD in native artery    Respiratory insufficiency    Acute post-operative pain    Type 2 diabetes mellitus without complication, without long-term current use of insulin (HCC)    S/P CABG x 2    Pre-op testing    Acute blood loss anemia    Hypotension due to blood loss       PLAN:   Reviewed the labs and consults  Will await cardiology opinion regarding the above observation      Discussed with nursing.     See  Orders  Yo RICHARDSON, [employer]  3:41 PM  5/10/2018

## 2018-05-10 NOTE — PROGRESS NOTES
Pulmonary [address] [facility]    Department of Internal Medicine  Division of Pulmonary, Critical Care & Sleep Medicine    SUBJECTIVE:    On 4 liters  Good Cough  Reexploration completed with good results:  SURGICAL NOTE: a significant amount of clot noted near the left hilum in the area of the course of the mammary artery and the incision in the pericardium. All this clot was removed. Bloody effusion was also noted which was removed via suction. Upon visual inspection throughout the chest, there was no obvious venous bleeding. OBJECTIVE:  Vitals:    05/10/18 0915 05/10/18 0930 05/10/18 1000 05/10/18 1100   BP: (!) 97/55 (!) 93/53  (!) 93/56   Pulse: 119 119 111 110   Resp: 21 30 18 22   Temp: 97.9 °F (36.6 °C) 97.7 °F (36.5 °C) 98 °F (36.7 °C)    TempSrc: Oral Oral Oral    SpO2: 93% 93% 94% 93%   Weight:       Height:         Constitutional: Alert,     EENT: EOMI EL. MMM. No icterus. No thrush. Neck: No thyromegaly. No elevated JVP. Trachea was midline. Respiratory: Symmetrical.  BS were clear. Cardiovascular: Regular, Sternotomy, Drains      Pulses:  Equal bilaterally. Abdomen: Soft without organomegaly. No rebound, rigidity. No guarding. Lymphatic: No lymphadenopathy. Musculoskeletal: Without weakness  Extremities:  + lower extremity edema. Reflexes appear adequate. Skin:  Warm and dry. Neurological/Psychiatric: No acute psychosis. Cranial nerves II-XII are intact. DATA:    Monitor Strips:  Reviewed & discusses with technical team. No changes noted.     RADIOLOGY:  Films were read/reviewed/discussed with radiology shows haziness left lung at base. hypoventiltion      CBC:   Lab Results   Component Value Date    WBC 13.7 05/10/2018    RBC 2.47 05/10/2018    HGB 7.8 05/10/2018    HCT 22.7 05/10/2018    MCV 91.9 05/10/2018    MCH 31.6 05/10/2018    MCHC 34.4 05/10/2018    RDW 15.5 05/10/2018     05/10/2018    MPV 10.8 05/10/2018     CMP:    Lab Results   Component Value Date     05/10/2018    K 3.9 05/10/2018    K 4.2 05/04/2018     05/10/2018    CO2 23 05/10/2018    BUN 13 05/10/2018    CREATININE 0.8 05/10/2018    GFRAA >60 05/10/2018    LABGLOM >60 05/10/2018    GLUCOSE 141 05/10/2018    PROT 4.6 05/09/2018    LABALBU 3.0 05/09/2018    CALCIUM 7.7 05/10/2018    BILITOT 0.7 05/09/2018    ALKPHOS 33 05/09/2018    AST 26 05/09/2018    ALT 13 05/09/2018     PT/INR:    Lab Results   Component Value Date    PROTIME 14.9 05/09/2018    INR 1.3 05/09/2018         CLINICAL ASSESMENT:  1. CABG x2   2. Reexploration for bleeding (increase drainage)  3. Normal lung function  4. LLL collapse improved  5. CAD  6. HTN  7. HLN  8. DM  9. BPH      PLAN: If needed the case was discussed with the care team    1. Transfuse to > 8   2. Wean pressors as able  3. Check TSH if not done  4. Bronchodilators and pulmonary hygeine  5. Wean oxygen   6. Monitor Hb  7.  Will monitor effusion but was drained via [address], DO, MPH, FCCP, Alcus Aus, [address]

## 2018-05-10 NOTE — PROGRESS NOTES
CVICU Progress Note    Name: Tuan Banda  MRN: [id number]     Indication for Surgery/Procedure: CAD  LVEF:  Normal    RVF:  Normal      Important/Relevant PMH/PSH: HTN, HPL, DM, BPH      Procedure/Surgeries: 5/8/2018 CABG x 2 (LIMA to LAD, SVG to OM1)    5/9/2018 Mediastinal Reexploration     Pacing wires: Atrial and Ventricular          Intake/Output Summary (Last 24 hours) at 05/10/18 0826  Last data filed at 05/10/18 0824   Gross per 24 hour   Intake          4375.25 ml   Output             1976 ml   Net          2399.25 ml       Recent Labs      05/09/18   0440  05/09/18   1035  05/10/18   0424   WBC  13.0*  12.6*  13.7*   HGB  6.9*  9.0*  7.8*   HCT  20.2*  26.2*  22.7*   PLT  128*  131  137      Recent Labs      05/09/18   0440  05/09/18   1035  05/10/18   0424   BUN  13  13  13   CREATININE  0.8  0.8  0.8       Physical Exam:    /68   Pulse 120   Temp 98.7 °F (37.1 °C) (Oral)   Resp (!) 35   Ht 5' 8\" (1.727 m)   Wt 232 lb 5.8 oz (105.4 kg)   SpO2 91%   BMI 35.33 kg/m²       CXR Findings:   Findings: The cardiomediastinal silhouette is enlarged with   calcifications of the aortic arch. The right lung is without gross   focal consolidation. There is a stable left-sided pleural effusion   with associated patchy left lower lobe airspace opacity. There is no   pneumothorax. The visualized osseous structures demonstrate   degenerative changes. Midline sternotomy wires and metallic   mediastinal hardware are identified. -CXR personally viewed and interpreted by ICU Nurse Practitioner, agree with above findings     General: Awake, alert. Denies SOB, palpitations   Pulmonary: Diminished bibasilar. No wheezes, no accessory muscle use noted   Cardiovascular:  RRR, no heaves or thrills on palpation  Abdomen: Soft, nontender, + BS   Extremities: Palpable pulses all extremities, 1+ peripheral edema   Neurologic: A&Ox3, PARK to command   Skin: Warm and dry.  No rashes noted   Incisions: MSI with UGO

## 2018-05-11 ENCOUNTER — APPOINTMENT (OUTPATIENT)
Dept: GENERAL RADIOLOGY | Age: 73
DRG: 236 | End: 2018-05-11
Attending: THORACIC SURGERY (CARDIOTHORACIC VASCULAR SURGERY)
Payer: MEDICARE

## 2018-05-11 LAB
ANION GAP SERPL CALCULATED.3IONS-SCNC: 8 MMOL/L (ref 7–16)
BUN BLDV-MCNC: 15 MG/DL (ref 8–23)
CALCIUM SERPL-MCNC: 7.9 MG/DL (ref 8.6–10.2)
CHLORIDE BLD-SCNC: 105 MMOL/L (ref 98–107)
CO2: 26 MMOL/L (ref 22–29)
CREAT SERPL-MCNC: 0.8 MG/DL (ref 0.7–1.2)
GFR AFRICAN AMERICAN: >60
GFR NON-AFRICAN AMERICAN: >60 ML/MIN/1.73
GLUCOSE BLD-MCNC: 177 MG/DL (ref 74–109)
HCT VFR BLD CALC: 27.1 % (ref 37–54)
HEMOGLOBIN: 9 G/DL (ref 12.5–16.5)
MAGNESIUM: 2.3 MG/DL (ref 1.6–2.6)
MCH RBC QN AUTO: 30.6 PG (ref 26–35)
MCHC RBC AUTO-ENTMCNC: 33.2 % (ref 32–34.5)
MCV RBC AUTO: 92.2 FL (ref 80–99.9)
METER GLUCOSE: 164 MG/DL (ref 70–110)
METER GLUCOSE: 169 MG/DL (ref 70–110)
METER GLUCOSE: 194 MG/DL (ref 70–110)
METER GLUCOSE: 218 MG/DL (ref 70–110)
METER GLUCOSE: 275 MG/DL (ref 70–110)
METER GLUCOSE: 321 MG/DL (ref 70–110)
PDW BLD-RTO: 16.2 FL (ref 11.5–15)
PLATELET # BLD: 137 E9/L (ref 130–450)
PMV BLD AUTO: 10.1 FL (ref 7–12)
POTASSIUM SERPL-SCNC: 3.8 MMOL/L (ref 3.5–5)
RBC # BLD: 2.94 E12/L (ref 3.8–5.8)
SODIUM BLD-SCNC: 139 MMOL/L (ref 132–146)
TSH SERPL DL<=0.05 MIU/L-ACNC: 2.46 UIU/ML (ref 0.27–4.2)
WBC # BLD: 11.5 E9/L (ref 4.5–11.5)

## 2018-05-11 PROCEDURE — 84443 ASSAY THYROID STIM HORMONE: CPT

## 2018-05-11 PROCEDURE — 6370000000 HC RX 637 (ALT 250 FOR IP): Performed by: THORACIC SURGERY (CARDIOTHORACIC VASCULAR SURGERY)

## 2018-05-11 PROCEDURE — 6360000002 HC RX W HCPCS: Performed by: NURSE PRACTITIONER

## 2018-05-11 PROCEDURE — 99233 SBSQ HOSP IP/OBS HIGH 50: CPT | Performed by: NURSE PRACTITIONER

## 2018-05-11 PROCEDURE — 80048 BASIC METABOLIC PNL TOTAL CA: CPT

## 2018-05-11 PROCEDURE — 82962 GLUCOSE BLOOD TEST: CPT

## 2018-05-11 PROCEDURE — 71045 X-RAY EXAM CHEST 1 VIEW: CPT

## 2018-05-11 PROCEDURE — 83735 ASSAY OF MAGNESIUM: CPT

## 2018-05-11 PROCEDURE — 2580000003 HC RX 258: Performed by: THORACIC SURGERY (CARDIOTHORACIC VASCULAR SURGERY)

## 2018-05-11 PROCEDURE — 6370000000 HC RX 637 (ALT 250 FOR IP): Performed by: NURSE PRACTITIONER

## 2018-05-11 PROCEDURE — 2140000000 HC CCU INTERMEDIATE R&B

## 2018-05-11 PROCEDURE — 99233 SBSQ HOSP IP/OBS HIGH 50: CPT | Performed by: INTERNAL MEDICINE

## 2018-05-11 PROCEDURE — 93798 PHYS/QHP OP CAR RHAB W/ECG: CPT

## 2018-05-11 PROCEDURE — 85027 COMPLETE CBC AUTOMATED: CPT

## 2018-05-11 PROCEDURE — 36415 COLL VENOUS BLD VENIPUNCTURE: CPT

## 2018-05-11 PROCEDURE — 2580000003 HC RX 258: Performed by: INTERNAL MEDICINE

## 2018-05-11 PROCEDURE — 6360000002 HC RX W HCPCS: Performed by: INTERNAL MEDICINE

## 2018-05-11 PROCEDURE — 6360000002 HC RX W HCPCS: Performed by: THORACIC SURGERY (CARDIOTHORACIC VASCULAR SURGERY)

## 2018-05-11 PROCEDURE — 94640 AIRWAY INHALATION TREATMENT: CPT

## 2018-05-11 RX ORDER — FUROSEMIDE 10 MG/ML
20 INJECTION INTRAMUSCULAR; INTRAVENOUS ONCE
Status: COMPLETED | OUTPATIENT
Start: 2018-05-11 | End: 2018-05-11

## 2018-05-11 RX ORDER — BISACODYL 10 MG
10 SUPPOSITORY, RECTAL RECTAL DAILY PRN
Status: DISCONTINUED | OUTPATIENT
Start: 2018-05-11 | End: 2018-05-13 | Stop reason: HOSPADM

## 2018-05-11 RX ORDER — ONDANSETRON 2 MG/ML
4 INJECTION INTRAMUSCULAR; INTRAVENOUS EVERY 8 HOURS PRN
Status: DISCONTINUED | OUTPATIENT
Start: 2018-05-11 | End: 2018-05-13 | Stop reason: HOSPADM

## 2018-05-11 RX ORDER — FERROUS SULFATE 325(65) MG
325 TABLET ORAL 2 TIMES DAILY WITH MEALS
Status: DISCONTINUED | OUTPATIENT
Start: 2018-05-12 | End: 2018-05-13 | Stop reason: HOSPADM

## 2018-05-11 RX ORDER — ASCORBIC ACID 500 MG
500 TABLET ORAL 2 TIMES DAILY
Status: DISCONTINUED | OUTPATIENT
Start: 2018-05-11 | End: 2018-05-13 | Stop reason: HOSPADM

## 2018-05-11 RX ORDER — DOCUSATE SODIUM 100 MG/1
100 CAPSULE, LIQUID FILLED ORAL 2 TIMES DAILY
Status: DISCONTINUED | OUTPATIENT
Start: 2018-05-11 | End: 2018-05-13 | Stop reason: HOSPADM

## 2018-05-11 RX ORDER — HYDROCODONE BITARTRATE AND ACETAMINOPHEN 5; 325 MG/1; MG/1
2 TABLET ORAL EVERY 4 HOURS PRN
Status: DISCONTINUED | OUTPATIENT
Start: 2018-05-11 | End: 2018-05-13 | Stop reason: HOSPADM

## 2018-05-11 RX ORDER — POTASSIUM CHLORIDE 20 MEQ/1
20 TABLET, EXTENDED RELEASE ORAL PRN
Status: DISCONTINUED | OUTPATIENT
Start: 2018-05-11 | End: 2018-05-13 | Stop reason: HOSPADM

## 2018-05-11 RX ORDER — ASPIRIN 81 MG/1
81 TABLET ORAL DAILY
Status: DISCONTINUED | OUTPATIENT
Start: 2018-05-12 | End: 2018-05-13 | Stop reason: HOSPADM

## 2018-05-11 RX ORDER — HYDROCODONE BITARTRATE AND ACETAMINOPHEN 5; 325 MG/1; MG/1
1 TABLET ORAL EVERY 4 HOURS PRN
Status: DISCONTINUED | OUTPATIENT
Start: 2018-05-11 | End: 2018-05-13 | Stop reason: HOSPADM

## 2018-05-11 RX ORDER — DEXTROSE MONOHYDRATE 25 G/50ML
12.5 INJECTION, SOLUTION INTRAVENOUS PRN
Status: DISCONTINUED | OUTPATIENT
Start: 2018-05-11 | End: 2018-05-13 | Stop reason: HOSPADM

## 2018-05-11 RX ORDER — FOLIC ACID 1 MG/1
1 TABLET ORAL DAILY
Status: DISCONTINUED | OUTPATIENT
Start: 2018-05-12 | End: 2018-05-13 | Stop reason: HOSPADM

## 2018-05-11 RX ORDER — NICOTINE POLACRILEX 4 MG
15 LOZENGE BUCCAL PRN
Status: DISCONTINUED | OUTPATIENT
Start: 2018-05-11 | End: 2018-05-13 | Stop reason: HOSPADM

## 2018-05-11 RX ORDER — ACETAMINOPHEN 325 MG/1
650 TABLET ORAL EVERY 4 HOURS PRN
Status: DISCONTINUED | OUTPATIENT
Start: 2018-05-11 | End: 2018-05-13 | Stop reason: HOSPADM

## 2018-05-11 RX ORDER — DEXTROSE MONOHYDRATE 50 MG/ML
100 INJECTION, SOLUTION INTRAVENOUS PRN
Status: DISCONTINUED | OUTPATIENT
Start: 2018-05-11 | End: 2018-05-13 | Stop reason: HOSPADM

## 2018-05-11 RX ADMIN — METOPROLOL TARTRATE 12.5 MG: 25 TABLET ORAL at 08:12

## 2018-05-11 RX ADMIN — INSULIN LISPRO 6 UNITS: 100 INJECTION, SOLUTION INTRAVENOUS; SUBCUTANEOUS at 13:43

## 2018-05-11 RX ADMIN — Medication 10 ML: at 08:13

## 2018-05-11 RX ADMIN — INSULIN LISPRO 3 UNITS: 100 INJECTION, SOLUTION INTRAVENOUS; SUBCUTANEOUS at 01:02

## 2018-05-11 RX ADMIN — INSULIN LISPRO 3 UNITS: 100 INJECTION, SOLUTION INTRAVENOUS; SUBCUTANEOUS at 09:07

## 2018-05-11 RX ADMIN — BUDESONIDE 500 MCG: 0.5 SUSPENSION RESPIRATORY (INHALATION) at 08:30

## 2018-05-11 RX ADMIN — METOPROLOL TARTRATE 25 MG: 25 TABLET ORAL at 20:23

## 2018-05-11 RX ADMIN — FUROSEMIDE 20 MG: 10 INJECTION INTRAMUSCULAR; INTRAVENOUS at 09:07

## 2018-05-11 RX ADMIN — INSULIN LISPRO 3 UNITS: 100 INJECTION, SOLUTION INTRAVENOUS; SUBCUTANEOUS at 04:52

## 2018-05-11 RX ADMIN — OXYCODONE HYDROCHLORIDE AND ACETAMINOPHEN 2 TABLET: 5; 325 TABLET ORAL at 08:12

## 2018-05-11 RX ADMIN — HYDROCODONE BITARTRATE AND ACETAMINOPHEN 1 TABLET: 5; 325 TABLET ORAL at 22:38

## 2018-05-11 RX ADMIN — MAGNESIUM GLUCONATE 500 MG ORAL TABLET 400 MG: 500 TABLET ORAL at 08:12

## 2018-05-11 RX ADMIN — AMIODARONE HYDROCHLORIDE 400 MG: 200 TABLET ORAL at 08:12

## 2018-05-11 RX ADMIN — OXYCODONE HYDROCHLORIDE AND ACETAMINOPHEN 2 TABLET: 5; 325 TABLET ORAL at 03:27

## 2018-05-11 RX ADMIN — IPRATROPIUM BROMIDE AND ALBUTEROL SULFATE 1 AMPULE: 2.5; .5 SOLUTION RESPIRATORY (INHALATION) at 19:27

## 2018-05-11 RX ADMIN — IPRATROPIUM BROMIDE AND ALBUTEROL SULFATE 1 AMPULE: 2.5; .5 SOLUTION RESPIRATORY (INHALATION) at 08:30

## 2018-05-11 RX ADMIN — MUPIROCIN: 20 OINTMENT TOPICAL at 08:13

## 2018-05-11 RX ADMIN — AMIODARONE HYDROCHLORIDE 400 MG: 200 TABLET ORAL at 20:23

## 2018-05-11 RX ADMIN — Medication 500 MG: at 22:37

## 2018-05-11 RX ADMIN — OXYCODONE HYDROCHLORIDE AND ACETAMINOPHEN 2 TABLET: 5; 325 TABLET ORAL at 12:02

## 2018-05-11 RX ADMIN — OXYCODONE HYDROCHLORIDE AND ACETAMINOPHEN 2 TABLET: 5; 325 TABLET ORAL at 16:16

## 2018-05-11 RX ADMIN — BUDESONIDE 500 MCG: 0.5 SUSPENSION RESPIRATORY (INHALATION) at 19:28

## 2018-05-11 RX ADMIN — Medication 10 ML: at 19:47

## 2018-05-11 RX ADMIN — IPRATROPIUM BROMIDE AND ALBUTEROL SULFATE 1 AMPULE: 2.5; .5 SOLUTION RESPIRATORY (INHALATION) at 11:45

## 2018-05-11 RX ADMIN — MUPIROCIN: 20 OINTMENT TOPICAL at 20:23

## 2018-05-11 RX ADMIN — TAMSULOSIN HYDROCHLORIDE 0.4 MG: 0.4 CAPSULE ORAL at 20:23

## 2018-05-11 RX ADMIN — FENTANYL CITRATE 25 MCG: 50 INJECTION INTRAMUSCULAR; INTRAVENOUS at 01:02

## 2018-05-11 RX ADMIN — INSULIN GLARGINE 22 UNITS: 100 INJECTION, SOLUTION SUBCUTANEOUS at 19:45

## 2018-05-11 RX ADMIN — IPRATROPIUM BROMIDE AND ALBUTEROL SULFATE 1 AMPULE: 2.5; .5 SOLUTION RESPIRATORY (INHALATION) at 15:40

## 2018-05-11 RX ADMIN — ASPIRIN 81 MG: 81 TABLET, COATED ORAL at 08:12

## 2018-05-11 RX ADMIN — TAMSULOSIN HYDROCHLORIDE 0.4 MG: 0.4 CAPSULE ORAL at 08:12

## 2018-05-11 RX ADMIN — WATER 1 G: 1 INJECTION INTRAMUSCULAR; INTRAVENOUS; SUBCUTANEOUS at 19:48

## 2018-05-11 RX ADMIN — DOCUSATE SODIUM 100 MG: 100 CAPSULE, LIQUID FILLED ORAL at 22:38

## 2018-05-11 RX ADMIN — INSULIN LISPRO 12 UNITS: 100 INJECTION, SOLUTION INTRAVENOUS; SUBCUTANEOUS at 19:44

## 2018-05-11 RX ADMIN — ATORVASTATIN CALCIUM 40 MG: 40 TABLET, FILM COATED ORAL at 20:23

## 2018-05-11 RX ADMIN — OXYCODONE HYDROCHLORIDE AND ACETAMINOPHEN 2 TABLET: 5; 325 TABLET ORAL at 20:24

## 2018-05-11 ASSESSMENT — PAIN DESCRIPTION - LOCATION
LOCATION: CHEST

## 2018-05-11 ASSESSMENT — PAIN DESCRIPTION - PAIN TYPE
TYPE: SURGICAL PAIN

## 2018-05-11 ASSESSMENT — PAIN SCALES - GENERAL
PAINLEVEL_OUTOF10: 3
PAINLEVEL_OUTOF10: 0
PAINLEVEL_OUTOF10: 0
PAINLEVEL_OUTOF10: 8
PAINLEVEL_OUTOF10: 7
PAINLEVEL_OUTOF10: 7
PAINLEVEL_OUTOF10: 0
PAINLEVEL_OUTOF10: 7
PAINLEVEL_OUTOF10: 8
PAINLEVEL_OUTOF10: 8

## 2018-05-11 ASSESSMENT — PAIN DESCRIPTION - DESCRIPTORS
DESCRIPTORS: SORE;ACHING
DESCRIPTORS: DISCOMFORT;SORE
DESCRIPTORS: ACHING;DISCOMFORT;SORE

## 2018-05-11 ASSESSMENT — PAIN DESCRIPTION - FREQUENCY
FREQUENCY: INTERMITTENT

## 2018-05-11 NOTE — PLAN OF CARE
Problem:  Activity Intolerance:  Goal: Able to perform prescribed physical activity  Able to perform prescribed physical activity   Outcome: Met This Shift      Problem: Anxiety:  Goal: Level of anxiety will decrease  Level of anxiety will decrease   Outcome: Met This Shift      Problem: Cardiac Output - Decreased:  Goal: Cardiac output within specified parameters  Cardiac output within specified parameters   Outcome: Met This Shift      Problem: Fluid Volume - Imbalance:  Goal: Ability to achieve a balanced intake and output will improve  Ability to achieve a balanced intake and output will improve   Outcome: Met This Shift      Problem: Gas Exchange - Impaired:  Goal: Levels of oxygenation will improve  Levels of oxygenation will improve   Outcome: Met This Shift      Problem: Pain:  Goal: Control of acute pain  Control of acute pain   Outcome: Met This Shift      Problem: Falls - Risk of:  Goal: Will remain free from falls  Will remain free from falls   Outcome: Met This Shift      Problem: Skin Integrity:  Goal: Will show no infection signs and symptoms  Will show no infection signs and symptoms   Outcome: Met This Shift

## 2018-05-11 NOTE — PROGRESS NOTES
Nutrition Assessment    Type and Reason for Visit: Initial    Nutrition Recommendations: Continue current diet, Continue current ONS (Add Edmar BID to aid in wound healing)    Malnutrition Assessment:  · Malnutrition Status: No malnutrition  · Findings of the 6 clinical characteristics of malnutrition (Minimum of 2 out of 6 clinical characteristics is required to make the diagnosis of moderate or severe Protein Calorie Malnutrition based on AND/ASPEN Guidelines):  1. Energy Intake-Greater than 75%  2. Weight Loss-No significant weight loss  3. Fat Loss-No significant subcutaneous fat loss  4. Muscle Loss-No significant muscle mass loss  5. Fluid Accumulation-No significant fluid accumulation  6.  Strength-Not measured    Nutrition Diagnosis:   · Problem: Increased nutrient needs  · Etiology: related to Increased demand for energy/nutrients due to (open heart sx)     Signs and symptoms:  as evidenced by Presence of wounds    Nutrition Assessment:  · Nutrition-Focused Physical Findings: pt A&Ox4, pleasant & talkative, hypotension improving (pressor off), +I/O's, +1 nonpitting BLE edema, active BS, abd distnetion, CT x 1      · Wound Type: Surgical Wound, Multiple     · Current Nutrition Therapies:  · Oral Diet Orders: Carb Control 3 Carbs/Meal, Cardiac   · Oral Diet intake: %  · Oral Nutrition Supplement (ONS) Orders: Diabetic Oral Supplement (TID)  · ONS intake: %     · Anthropometric Measures:  · Ht: 5' 8\" (172.7 cm)   · Current Body Wt: 234 lb (106.1 kg) (5/11 actual )  · Admission Body Wt: 221 lb (100.2 kg) (5/4 actual from PAT)  · Usual Body Wt:  (~220lb per pt.  No actual EMR hx on file)  · % Weight Change: CBW elevated since admit, otherwise stable wt hx   · Ideal Body Wt: 154 lb (69.9 kg), % Ideal Body 144% (using adm wt)  · Adjusted Body Wt: 171 lb (77.6 kg) (using adm wt), body weight adjusted for Obesity  · BMI Classification: BMI 30.0 - 34.9 Obese Class I (using adm wt) · Comparative Standards (Estimated Nutrition Needs):  · Estimated Daily Total Kcal: 4209-7794 (MSJ REE 1734 x 1.2 SF)  · Estimated Daily Protein (g):     Estimated Intake vs Estimated Needs: Intake Less Than Needs (protein)    Nutrition Risk Level: Low    Nutrition Interventions: Continued Inpatient Monitoring, Coordination of Care, Education Initiated (Protein importance/ supplements explained for recovery. Pt will need further diet instruction when transferred)    Nutrition Evaluation:   · Evaluation: Goals set   · Goals: Pt to consume >75% meals and ONS    · Monitoring: Meal Intake, Supplement Intake, Diet Tolerance, Weight, Comparative Standards, Pertinent Labs, Skin Integrity, Wound Healing, Fluid Balance, Ascites/Edema    See Adult Nutrition Doc Flowsheet for more detail.      Electronically signed by Coreen Reece RD, LD on 5/11/18 at 2:53 PM    Contact Number: Ext [phone]

## 2018-05-11 NOTE — PROGRESS NOTES
[facility] Quality Flow/Interdisciplinary Rounds Progress Note        Quality Flow Rounds held on May 11, 2018    Disciplines Attending: Nursing Unit Leadership, Bedside RN, APRN-MIMI  Guy Royal was admitted on 5/8/2018  5:08 AM    Anticipated Discharge Date:  Expected Discharge Date: 05/09/18    Disposition:    Valeriy Score:  Valeriy Scale Score: 20    Readmission Risk              Risk of Unplanned Readmission:        13             Discussed patient goal for the day, patient clinical progression, and barriers to discharge.   The following Goal(s) of the Day/Commitment(s) have been identified:  Transfer to [address] [place] [address]  May 11, 2018

## 2018-05-11 NOTE — PROGRESS NOTES
PROGRESS  NOTE -- INTERNAL AND GERIATRIC MEDICINE  Lacey Johns MD, FACP                                                               SUBJECTIVE:  Christiano Class  is alert, awake, and cooperative. Denies severe chest pain, nausea, dyspnea, emesis. Has controlled incisional pain   No abdominal pain. Tolerating diet. He is post ACB and re-entry for mediastinal bleeding  He is doing much better today  Tubes out ambulation started    ROS:  Negative to a 10 system review except that mentioned in the HPI. Pertinent items are noted in HPI.          DIET:Dietary Nutrition Supplements: Diabetic Oral Supplement  DIET CARDIAC; Carb Control: 3 carb choices (45 gms)/meal    No Known Allergies    MEDICATION SIDE EFFECTS:none    SCHEDULED MEDS:   Current Facility-Administered Medications   Medication Dose Route Frequency Provider Last Rate Last Dose    metoprolol tartrate (LOPRESSOR) tablet 25 mg  25 mg Oral BID Carli Schuyler, APRN - CNP        amiodarone (CORDARONE) tablet 400 mg  400 mg Oral BID Carli Schuyler, APRN - CNP   400 mg at 05/11/18 0812    phenylephrine (KATELYN-SYNEPHRINE) 50 mg in dextrose 5 % 250 mL infusion  100 mcg/min Intravenous Continuous Aga Brody, APRN - CNP   Stopped at 05/11/18 0102    perflutren lipid microspheres (DEFINITY) injection 1.65 mg  1.5 mL Intravenous ONCE PRN Aga Brody, APRN - CNP        0.9 % sodium chloride bolus  250 mL Intravenous Once Lavinia Farmer, DO        insulin glargine (LANTUS) injection vial 22 Units  22 Units Subcutaneous Nightly Aga Brody, APRN - CNP   22 Units at 05/10/18 2128    atorvastatin (LIPITOR) tablet 40 mg  40 mg Oral Daily Lavinia Farmer, DO   40 mg at 05/10/18 2023    tamsulosin (FLOMAX) capsule 0.4 mg  0.4 mg Oral BID Lavinia Farmer, DO   0.4 mg at 05/11/18 0812    0.9 % sodium chloride infusion  30 mL/hr Intravenous Continuous Lavinia Farmer, DO 30 mL/hr at 05/10/18 1951 30 mL/hr at 05/10/18 1951    sodium chloride flush 0.9 % injection 10 mL  10 mL Intravenous 2 times per day [facility], DO   10 mL at 05/11/18 0813    sodium chloride flush 0.9 % injection 10 mL  10 mL Intravenous PRN [facility], DO        potassium chloride 20 mEq/50 mL IVPB (Central Line)  20 mEq Intravenous PRN [facility], DO   Stopped at 05/08/18 1819    magnesium sulfate 2 g in 50 mL IVPB premix  2 g Intravenous PRN [facility], DO        acetaminophen (TYLENOL) tablet 650 mg  650 mg Oral Q4H PRN [facility], DO        oxyCODONE-acetaminophen (PERCOCET) 5-325 MG per tablet 1 tablet  1 tablet Oral Q4H PRN [facility], DO        Or    oxyCODONE-acetaminophen (PERCOCET) 5-325 MG per tablet 2 tablet  2 tablet Oral Q4H PRN [facility], DO   2 tablet at 05/11/18 1253    docusate sodium (COLACE) capsule 100 mg  100 mg Oral BID PRN [facility], DO        on[facility]etron [email or web address]) injection 4 mg  4 mg Intravenous Q8H PRN [facility], DO   4 mg at 05/10/18 0130    aspirin EC tablet 81 mg  81 mg Oral Daily [facility], DO   81 mg at 05/11/18 0812    albumin human 5 % bottle 25 g  25 g Intravenous PRN [facility], DO   25 g at 05/08/18 1448    0.9 % sodium chloride bolus  250 mL Intravenous Continuous PRN [facility], DO   Stopped at 05/08/18 2030    phenylephrine (KATELYN-SYNEPHRINE) 50 mg in dextrose 5 % 250 mL infusion  10 mcg/min Intravenous Continuous PRN [facility], DO        norepinephrine (LEVOPHED) 16 mg in dextrose 5 % 250 mL infusion  2 mcg/min Intravenous Continuous PRN [facility], DO   Stopped at 05/09/18 0025    clevidipine (CLEVIPREX) infusion  2 mg/hr Intravenous Continuous PRN [facility], DO        insulin regular (HUMULIN R;NOVOLIN R) 100 Units in sodium chloride 0.9 % 100 mL infusion  1 Units/hr Intravenous Continuous PRN [facility], DO   Stopped at 05/10/18 0100    glucose (GLUTOSE) 40 % oral gel 15 g  15 g Oral PRN Adrian Middleton DO        dextrose 50 % solution 12.5 g  12.5 g Intravenous PRN [place], DO        glucagon (rDNA) injection 1 mg  1 mg Intramuscular PRN [place], DO        dextrose 5 % solution  100 mL/hr Intravenous PRN [place], DO        ipratropium-albuterol (DUONEB) nebulizer solution 1 ampule  1 ampule Inhalation Q4H [place], DO   1 ampule at 05/11/18 1145    insulin lispro (HUMALOG) injection vial 0-18 Units  0-18 Units Subcutaneous Q4H [place], DO   3 Units at 05/11/18 0907    fentaNYL (SUBLIMAZE) injection 25 mcg  25 mcg Intravenous Q1H PRN [place], DO   25 mcg at 05/11/18 0102    mupirocin (BACTROBAN) 2 % ointment   Nasal BID [place], DO        magnesium oxide (MAG-OX) tablet 400 mg  400 mg Oral Daily [facility], DO   400 mg at 05/11/18 9489    acetaminophen (TYLENOL) suppository 650 mg  650 mg Rectal Q4H PRN [place], DO        calcium gluconate 1 g in dextrose 5 % 50 mL IVPB  1 g Intravenous PRN [facility], DO   Stopped at 05/10/18 0900    propofol 1000 MG/100ML injection  10 mcg/kg/min Intravenous Titrated KIM Yan - CNP   Stopped at 05/09/18 1155    cefTRIAXone (ROCEPHIN) 1 g in sterile water 10 mL IV syringe  1 g Intravenous Q24H [facility], DO   1 g at 05/10/18 1723    budesonide (PULMICORT) nebulizer suspension 500 mcg  0.5 mg Nebulization BID Cathleen Monterroso [facility], DO   500 mcg at 05/11/18 0830    vasopressin 20 Units in dextrose 5 % 100 mL infusion  0.02 Units/min Intravenous Continuous [facility], DO 12 mL/hr at 05/09/18 0809 0.04 Units/min at 05/09/18 0809       I have reviewed the patient's medications; see Medication Reconciliation.     Current  Infusions   phenylephrine (KATELYN-SYNEPHRINE) 50mg/250mL infusion Stopped (05/11/18 0102)    sodium chloride 30 mL/hr (05/10/18 1951)    sodium chloride Stopped (05/08/18 2030)    phenylephrine (KATELYN-SYNEPHRINE) 50mg/250mL infusion      norepinephrine Stopped (05/09/18 0025)    clevidipine      insulin (HUMAN R) non-weight based infusion Stopped (05/10/18 0100)    dextrose      propofol Stopped (05/09/18 1155)    vasopressin infusion 0.04 Units/min (05/09/18 0809)       Prn Medsperflutren lipid microspheres, sodium chloride flush, potassium chloride, magnesium sulfate, acetaminophen, oxyCODONE-acetaminophen **OR** oxyCODONE-acetaminophen, docusate sodium, ondansetron, albumin human, sodium chloride, phenylephrine (KATELYN-SYNEPHRINE) 50mg/250mL infusion, norepinephrine, clevidipine, insulin (HUMAN R) non-weight based infusion, glucose, dextrose, glucagon (rDNA), dextrose, fentanNYL, acetaminophen, calcium gluconate IVPB     OBJECTIVE:  Wt Readings from Last 3 Encounters:   05/11/18 234 lb 14.4 oz (106.5 kg)   05/05/18 220 lb (99.8 kg)   05/04/18 221 lb (100.2 kg)     Temp Readings from Last 3 Encounters:   05/11/18 98.7 °F (37.1 °C) (Core)   05/09/18 98.6 °F (37 °C)   05/08/18 98.2 °F (36.8 °C)     BP Readings from Last 3 Encounters:   05/11/18 123/68   05/08/18 116/70   05/05/18 119/74     Pulse Readings from Last 3 Encounters:   05/11/18 118   05/05/18 75   05/04/18 78     @LASTSAO2(3)@    General appearance: alert, appears stated age and cooperative  Skin: Skin color, texture, turgor normal. No rashes or lesions  HEENT: Head: Normocephalic, no lesions, without obvious abnormality.   Neck: no adenopathy, supple, symmetrical, trachea midline and thyroid not enlarged, symmetric, no tenderness/mass/nodules  Lungs: clear to auscultation bilaterally and diminished breath sounds bibasilar  Heart: S1S2 rapid but regular  Abdomen: soft, non-tender; bowel sounds normal; no masses,  no organomegaly  Extremities: extremities normal, atraumatic, no cyanosis or edema  Neurologic:  Alert, oriented, thought content appropriate    CBC:   Lab Results   Component Value Date    WBC 11.5 05/11/2018    RBC 2.94 05/11/2018    HGB 9.0 05/11/2018    HCT 27.1 05/11/2018    MCV 92.2 05/11/2018    MCH 30.6 05/11/2018    MCHC 33.2 05/11/2018 RDW 16.2 05/11/2018     05/11/2018    MPV 10.1 05/11/2018     Hemoglobin/Hematocrit:    Lab Results   Component Value Date    HGB 9.0 05/11/2018    HCT 27.1 05/11/2018       TELEMETRY: REVIEWED--Telemetry: Sinus    RADIOLOGY: REVIEWED AVAILABLE REPORT  XR CHEST PORTABLE   Final Result   1. Since examination dated 05/10/2018, interval increase in size of   the left-sided pleural effusion with worsening patchy left lower lobe   airspace opacity. Recommend clinical correlation. 2. Unchanged postoperative lines and tubes as described above. XR CHEST PORTABLE   Final Result   1. Since examination dated 05/09/2018, interval removal of the   endotracheal tube and nasogastric tube. 2. Relatively unchanged left-sided pleural effusion with associated   patchy left lower lobe airspace opacity. Recommend clinical   correlation. 3. Unchanged remaining postoperative lines and tubes as described   above. XR CHEST PORTABLE   Final Result   1. Right-sided central line/introducer. 2. Stable, enlarged cardiomediastinal silhouette. .   3. Left basilar infiltrate/pneumonia left pleural effusion. 4. Interval placement of mediastinal drains, left-sided chest tube,   endotracheal tube and NG tube as described above. 5. Suspected underlying mild central pulmonary vascular congestion. XR CHEST PORTABLE   Final Result   ALERT:  THIS IS AN ABNORMAL REPORT   1. Increased enlargement of the cardiomediastinal silhouette when   compared with the previous exam, particularly in the left hilar   region. . The possibility of underlying pericardial effusion or other   cardiac abnormalities is not excluded on the basis of this exam in   this postoperative patient, clinical correlation recommended. 2. Left basilar infiltrate/pneumonia left pleural effusion. 3. Interval removal of endotracheal tube and NG tube. 4. Possible underlying pulmonary edema. XR CHEST PORTABLE   Final Result   1.  Left basilar

## 2018-05-11 NOTE — PLAN OF CARE
Problem: Anxiety:  Goal: Level of anxiety will decrease  Level of anxiety will decrease   Outcome: Met This Shift      Problem: Fluid Volume - Imbalance:  Goal: Ability to achieve a balanced intake and output will improve  Ability to achieve a balanced intake and output will improve   Outcome: Met This Shift      Problem: Gas Exchange - Impaired:  Goal: Levels of oxygenation will improve  Levels of oxygenation will improve   Outcome: Met This Shift      Problem: Tissue Perfusion - Cardiopulmonary, Altered:  Goal: Absence of angina  Absence of angina   Outcome: Met This Shift

## 2018-05-11 NOTE — PLAN OF CARE
Problem: Anxiety:  Goal: Level of anxiety will decrease  Level of anxiety will decrease   Outcome: Met This Shift      Problem: Cardiac Output - Decreased:  Goal: Cardiac output within specified parameters  Cardiac output within specified parameters   Outcome: Met This Shift      Problem: Gas Exchange - Impaired:  Goal: Levels of oxygenation will improve  Levels of oxygenation will improve   Outcome: Met This Shift      Problem: Pain:  Goal: Control of acute pain  Control of acute pain   Outcome: Met This Shift

## 2018-05-11 NOTE — DISCHARGE INSTR - DIET
 Good nutrition is important when healing from an illness, injury, or surgery. Follow any nutrition recommendations given to you during your hospital stay.  If you were given an oral nutrition supplement while in the hospital, continue to take this supplement at home. You can take it with meals, in-between meals, and/or before bedtime. Glucerna 3x/day & Edmar Wound healing 2x/day. These supplements can be purchased at most local grocery stores, pharmacies, and chain super-stores.  If you have any questions about your diet or nutrition, call the hospital and ask for the dietitian.

## 2018-05-11 NOTE — PROGRESS NOTES
Pulmonary [facility]    Department of Internal Medicine  Division of Pulmonary, Critical Care & Sleep Medicine    SUBJECTIVE:    On 4 liters  Good Cough  Reexploration completed with good results:  CXR with effusion reported  Up in chair        SURGICAL NOTE: a significant amount of clot noted near the left hilum in the area of the course of the mammary artery and the incision in the pericardium. All this clot was removed. Bloody effusion was also noted which was removed via suction. Upon visual inspection throughout the chest, there was no obvious venous bleeding. OBJECTIVE:  Vitals:    05/11/18 1000 05/11/18 1100 05/11/18 1145 05/11/18 1200   BP:       Pulse: 102 118  99   Resp: 19 13 27 19   Temp: 98.7 °F (37.1 °C)   98.9 °F (37.2 °C)   TempSrc: Core   Oral   SpO2: 92% 92% 97% 97%   Weight:       Height:         Constitutional: Alert,     EENT: EOMI EL. MMM. No icterus. No thrush. Neck: No thyromegaly. No elevated JVP. Trachea was midline. Respiratory: Symmetrical.  BS were clear.  + egophony  Cardiovascular: Regular, Sternotomy,     Pulses:  Equal bilaterally. Abdomen: Soft without organomegaly. No rebound, rigidity. No guarding. Lymphatic: No lymphadenopathy. Musculoskeletal: Without weakness  Extremities:  + lower extremity edema. Reflexes appear adequate. Skin:  Warm and dry. Neurological/Psychiatric: No acute psychosis. Cranial nerves II-XII are intact. DATA:    Monitor Strips:  Reviewed & discusses with technical team. No changes noted.     RADIOLOGY:  Films were read/reviewed/discussed with radiology shows haziness left lung at base. hypoventiltion      CBC:   Lab Results   Component Value Date    WBC 11.5 05/11/2018    RBC 2.94 05/11/2018    HGB 9.0 05/11/2018    HCT 27.1 05/11/2018    MCV 92.2 05/11/2018    MCH 30.6 05/11/2018    MCHC 33.2 05/11/2018    RDW 16.2 05/11/2018     05/11/2018    MPV 10.1 05/11/2018     CMP:    Lab Results   Component Value Date     05/11/2018    K 3.8 05/11/2018    K 4.2 05/04/2018     05/11/2018    CO2 26 05/11/2018    BUN 15 05/11/2018    CREATININE 0.8 05/11/2018    GFRAA >60 05/11/2018    LABGLOM >60 05/11/2018    GLUCOSE 177 05/11/2018    PROT 4.6 05/09/2018    LABALBU 3.0 05/09/2018    CALCIUM 7.9 05/11/2018    BILITOT 0.7 05/09/2018    ALKPHOS 33 05/09/2018    AST 26 05/09/2018    ALT 13 05/09/2018     PT/INR:    Lab Results   Component Value Date    PROTIME 14.9 05/09/2018    INR 1.3 05/09/2018         CLINICAL ASSESMENT:  1. CABG x2   2. Reexploration for bleeding (increase drainage)  3. Normal lung function  4. LLL collapse improved  5. CAD  6. HTN  7. HLN  8. DM  9. BPH      PLAN: If needed the case was discussed with the care team    1. Keep Hb > 8   2. Check TSH if not done  3. Bronchodilators and pulmonary hygeine  4. Wean oxygen   5. Will monitor effusion but was drained via OR  6. Thoracentesis maybe in am  7. Ok to transfer  8.  Stop Antibioitcs      Roxie Manning DO, MPH, FCCP, [place], [place]

## 2018-05-11 NOTE — PROGRESS NOTES
CVICU Progress Note    Name: Dominguez Pollard  MRN: [id number]       Indication for Surgery/Procedure: CAD  LVEF:  Normal    RVF:  Normal      Important/Relevant PMH/PSH: HTN, HPL, DM, BPH      Procedure/Surgeries: 5/8/2018 CABG x 2 (LIMA to LAD, SVG to OM1)     5/9/2018 Mediastinal Reexploration      Pacing wires: Atrial and Ventricular           Intake/Output Summary (Last 24 hours) at 05/11/18 0750  Last data filed at 05/11/18 0700   Gross per 24 hour   Intake             2204 ml   Output             1350 ml   Net              854 ml       Recent Labs      05/09/18   1035  05/10/18   0424  05/10/18   1205  05/10/18   2005  05/11/18   0450   WBC  12.6*  13.7*   --    --   11.5   HGB  9.0*  7.8*  8.2*  9.2*  9.0*   HCT  26.2*  22.7*  24.1*  27.3*  27.1*   PLT  131  137   --    --   137      Recent Labs      05/09/18   1035  05/10/18   0424  05/11/18   0450   BUN  13  13  15   CREATININE  0.8  0.8  0.8       Physical Exam:    BP 94/71   Pulse 90   Temp 98.4 °F (36.9 °C) (Core)   Resp 20   Ht 5' 8\" (1.727 m)   Wt 234 lb 14.4 oz (106.5 kg)   SpO2 95%   BMI 35.72 kg/m²       CXR Findings:       Findings: The cardiomediastinal silhouette is enlarged with   calcifications of the aortic arch. The right lung is without gross   focal consolidation. There has been interval increase in size of the   left-sided pleural effusion with worsening patchy left lower lobe   airspace opacity. There is no pneumothorax. The visualized osseous   structures demonstrate degenerative changes. Midline sternotomy wires   and metallic mediastinal hardware are identified.         CXR personally viewed and interpreted by ICU Nurse Practitioner, agree with above findings     General: Awake, alert. Denies SOB, palpitations   Pulmonary: Diminished bibasilar, left>right.  No wheezes, no accessory muscle use noted   Cardiovascular:  RRR, no heaves or thrills on palpation  Abdomen: Soft, nontender, + BS   Extremities: Palpable pulses all

## 2018-05-11 NOTE — PROGRESS NOTES
DAILY PROGRESS NOTE - [facility]    SUBJECTIVE:    Patient being followed for coronary disease post CABG on May 8, utilizing LIMA to LAD and vein graft to 1st obtuse marginal. Echocardiogram reveals normal LV size/EF with normal valves and stage I diastolic dysfunction. Developed increased postoperative bleeding and had reexploration on 5/8 for mediastinal hemorrhage and no obvious bleeding was noted although there was clot noted in the left hilum near the pericardial incision, which was removed. Bronchoscopy on 5/8 showed left lower lobe collapse and he had removal of thick mucus secretions. Recent limited echocardiogram showed no evidence of pericardial effusion with normal LVEF. Awake in bed, states that he is feeling much better. No worsening dyspnea, chest pain, palpitations. Sinus rhythm on telemetry. Off of pressors and blood pressure today 112/70. Potential for transfer upstairs out of ICU. OBJECTIVE:    His vital signs were reviewed today.   /70 at 8 AM.    Vitals:    05/11/18 0700   BP: 94/71   Pulse: 90   Resp: 20   Temp:    SpO2: 95%       Scheduled Meds:   amiodarone  400 mg Oral BID    metoprolol tartrate  12.5 mg Oral BID    sodium chloride  250 mL Intravenous Once    insulin glargine  22 Units Subcutaneous Nightly    atorvastatin  40 mg Oral Daily    tamsulosin  0.4 mg Oral BID    sodium chloride flush  10 mL Intravenous 2 times per day    aspirin  81 mg Oral Daily    ipratropium-albuterol  1 ampule Inhalation Q4H WA    insulin lispro  0-18 Units Subcutaneous Q4H    mupirocin   Nasal BID    magnesium oxide  400 mg Oral Daily    cefTRIAXone (ROCEPHIN) IV  1 g Intravenous Q24H    budesonide  0.5 mg Nebulization BID     Continuous Infusions:   phenylephrine (KATELNY-SYNEPHRINE) 50mg/250mL infusion Stopped (05/11/18 0102)    sodium chloride 30 mL/hr (05/10/18 1951)    sodium chloride Stopped (05/08/18 2030)    phenylephrine (KATELYN-SYNEPHRINE) 50mg/250mL infusion      Diabetes.

## 2018-05-12 ENCOUNTER — APPOINTMENT (OUTPATIENT)
Dept: ULTRASOUND IMAGING | Age: 73
DRG: 236 | End: 2018-05-12
Attending: THORACIC SURGERY (CARDIOTHORACIC VASCULAR SURGERY)
Payer: MEDICARE

## 2018-05-12 ENCOUNTER — APPOINTMENT (OUTPATIENT)
Dept: GENERAL RADIOLOGY | Age: 73
DRG: 236 | End: 2018-05-12
Attending: THORACIC SURGERY (CARDIOTHORACIC VASCULAR SURGERY)
Payer: MEDICARE

## 2018-05-12 LAB
ANION GAP SERPL CALCULATED.3IONS-SCNC: 16 MMOL/L (ref 7–16)
BUN BLDV-MCNC: 16 MG/DL (ref 8–23)
CALCIUM SERPL-MCNC: 8.7 MG/DL (ref 8.6–10.2)
CHLORIDE BLD-SCNC: 100 MMOL/L (ref 98–107)
CO2: 21 MMOL/L (ref 22–29)
CREAT SERPL-MCNC: 0.9 MG/DL (ref 0.7–1.2)
GFR AFRICAN AMERICAN: >60
GFR NON-AFRICAN AMERICAN: >60 ML/MIN/1.73
GLUCOSE BLD-MCNC: 241 MG/DL (ref 74–109)
HCT VFR BLD CALC: 31.7 % (ref 37–54)
HEMOGLOBIN: 10.1 G/DL (ref 12.5–16.5)
MCH RBC QN AUTO: 29.5 PG (ref 26–35)
MCHC RBC AUTO-ENTMCNC: 31.9 % (ref 32–34.5)
MCV RBC AUTO: 92.7 FL (ref 80–99.9)
METER GLUCOSE: 217 MG/DL (ref 70–110)
METER GLUCOSE: 217 MG/DL (ref 70–110)
METER GLUCOSE: 221 MG/DL (ref 70–110)
METER GLUCOSE: 229 MG/DL (ref 70–110)
METER GLUCOSE: 246 MG/DL (ref 70–110)
PDW BLD-RTO: 15.9 FL (ref 11.5–15)
PLATELET # BLD: 220 E9/L (ref 130–450)
PMV BLD AUTO: 10.4 FL (ref 7–12)
POTASSIUM REFLEX MAGNESIUM: 3.6 MMOL/L (ref 3.5–5)
RBC # BLD: 3.42 E12/L (ref 3.8–5.8)
SODIUM BLD-SCNC: 137 MMOL/L (ref 132–146)
WBC # BLD: 11.8 E9/L (ref 4.5–11.5)

## 2018-05-12 PROCEDURE — 6370000000 HC RX 637 (ALT 250 FOR IP): Performed by: NURSE PRACTITIONER

## 2018-05-12 PROCEDURE — 2580000003 HC RX 258: Performed by: INTERNAL MEDICINE

## 2018-05-12 PROCEDURE — 36415 COLL VENOUS BLD VENIPUNCTURE: CPT

## 2018-05-12 PROCEDURE — 76604 US EXAM CHEST: CPT

## 2018-05-12 PROCEDURE — 94640 AIRWAY INHALATION TREATMENT: CPT

## 2018-05-12 PROCEDURE — 99233 SBSQ HOSP IP/OBS HIGH 50: CPT | Performed by: INTERNAL MEDICINE

## 2018-05-12 PROCEDURE — 71045 X-RAY EXAM CHEST 1 VIEW: CPT

## 2018-05-12 PROCEDURE — 2140000000 HC CCU INTERMEDIATE R&B

## 2018-05-12 PROCEDURE — 6370000000 HC RX 637 (ALT 250 FOR IP): Performed by: PHYSICIAN ASSISTANT

## 2018-05-12 PROCEDURE — 6370000000 HC RX 637 (ALT 250 FOR IP): Performed by: THORACIC SURGERY (CARDIOTHORACIC VASCULAR SURGERY)

## 2018-05-12 PROCEDURE — 80048 BASIC METABOLIC PNL TOTAL CA: CPT

## 2018-05-12 PROCEDURE — 85027 COMPLETE CBC AUTOMATED: CPT

## 2018-05-12 PROCEDURE — 93798 PHYS/QHP OP CAR RHAB W/ECG: CPT

## 2018-05-12 PROCEDURE — 6360000002 HC RX W HCPCS: Performed by: PHYSICIAN ASSISTANT

## 2018-05-12 PROCEDURE — 82962 GLUCOSE BLOOD TEST: CPT

## 2018-05-12 PROCEDURE — 6360000002 HC RX W HCPCS: Performed by: INTERNAL MEDICINE

## 2018-05-12 PROCEDURE — 2700000000 HC OXYGEN THERAPY PER DAY

## 2018-05-12 RX ORDER — FUROSEMIDE 10 MG/ML
20 INJECTION INTRAMUSCULAR; INTRAVENOUS ONCE
Status: COMPLETED | OUTPATIENT
Start: 2018-05-12 | End: 2018-05-12

## 2018-05-12 RX ORDER — GLIPIZIDE 5 MG/1
5 TABLET ORAL
Status: DISCONTINUED | OUTPATIENT
Start: 2018-05-12 | End: 2018-05-13 | Stop reason: HOSPADM

## 2018-05-12 RX ADMIN — TAMSULOSIN HYDROCHLORIDE 0.4 MG: 0.4 CAPSULE ORAL at 08:29

## 2018-05-12 RX ADMIN — DOCUSATE SODIUM 100 MG: 100 CAPSULE, LIQUID FILLED ORAL at 20:05

## 2018-05-12 RX ADMIN — FOLIC ACID 1 MG: 1 TABLET ORAL at 08:29

## 2018-05-12 RX ADMIN — ASPIRIN 81 MG: 81 TABLET, COATED ORAL at 08:29

## 2018-05-12 RX ADMIN — INSULIN LISPRO 6 UNITS: 100 INJECTION, SOLUTION INTRAVENOUS; SUBCUTANEOUS at 11:33

## 2018-05-12 RX ADMIN — MAGNESIUM GLUCONATE 500 MG ORAL TABLET 400 MG: 500 TABLET ORAL at 08:29

## 2018-05-12 RX ADMIN — MUPIROCIN: 20 OINTMENT TOPICAL at 20:05

## 2018-05-12 RX ADMIN — MAGNESIUM HYDROXIDE 30 ML: 400 SUSPENSION ORAL at 08:29

## 2018-05-12 RX ADMIN — DOCUSATE SODIUM 100 MG: 100 CAPSULE, LIQUID FILLED ORAL at 08:29

## 2018-05-12 RX ADMIN — AMIODARONE HYDROCHLORIDE 400 MG: 200 TABLET ORAL at 20:05

## 2018-05-12 RX ADMIN — HYDROCODONE BITARTRATE AND ACETAMINOPHEN 2 TABLET: 5; 325 TABLET ORAL at 14:51

## 2018-05-12 RX ADMIN — POTASSIUM CHLORIDE 60 MEQ: 20 TABLET, EXTENDED RELEASE ORAL at 09:10

## 2018-05-12 RX ADMIN — IPRATROPIUM BROMIDE AND ALBUTEROL SULFATE 1 AMPULE: 2.5; .5 SOLUTION RESPIRATORY (INHALATION) at 09:23

## 2018-05-12 RX ADMIN — METFORMIN HYDROCHLORIDE 500 MG: 500 TABLET ORAL at 16:55

## 2018-05-12 RX ADMIN — BUDESONIDE 500 MCG: 0.5 SUSPENSION RESPIRATORY (INHALATION) at 09:23

## 2018-05-12 RX ADMIN — INSULIN LISPRO 6 UNITS: 100 INJECTION, SOLUTION INTRAVENOUS; SUBCUTANEOUS at 20:12

## 2018-05-12 RX ADMIN — HYDROCODONE BITARTRATE AND ACETAMINOPHEN 2 TABLET: 5; 325 TABLET ORAL at 03:15

## 2018-05-12 RX ADMIN — WATER 1 G: 1 INJECTION INTRAMUSCULAR; INTRAVENOUS; SUBCUTANEOUS at 16:55

## 2018-05-12 RX ADMIN — METOPROLOL TARTRATE 25 MG: 25 TABLET ORAL at 08:30

## 2018-05-12 RX ADMIN — HYDROCODONE BITARTRATE AND ACETAMINOPHEN 2 TABLET: 5; 325 TABLET ORAL at 20:05

## 2018-05-12 RX ADMIN — MUPIROCIN: 20 OINTMENT TOPICAL at 08:29

## 2018-05-12 RX ADMIN — INSULIN LISPRO 6 UNITS: 100 INJECTION, SOLUTION INTRAVENOUS; SUBCUTANEOUS at 16:55

## 2018-05-12 RX ADMIN — ATORVASTATIN CALCIUM 40 MG: 40 TABLET, FILM COATED ORAL at 20:05

## 2018-05-12 RX ADMIN — TAMSULOSIN HYDROCHLORIDE 0.4 MG: 0.4 CAPSULE ORAL at 20:05

## 2018-05-12 RX ADMIN — FERROUS SULFATE TAB 325 MG (65 MG ELEMENTAL FE) 325 MG: 325 (65 FE) TAB at 16:55

## 2018-05-12 RX ADMIN — FUROSEMIDE 20 MG: 10 INJECTION, SOLUTION INTRAVENOUS at 11:33

## 2018-05-12 RX ADMIN — Medication 500 MG: at 08:29

## 2018-05-12 RX ADMIN — BUDESONIDE 500 MCG: 0.5 SUSPENSION RESPIRATORY (INHALATION) at 21:00

## 2018-05-12 RX ADMIN — IPRATROPIUM BROMIDE AND ALBUTEROL SULFATE 1 AMPULE: 2.5; .5 SOLUTION RESPIRATORY (INHALATION) at 21:00

## 2018-05-12 RX ADMIN — AMIODARONE HYDROCHLORIDE 400 MG: 200 TABLET ORAL at 08:29

## 2018-05-12 RX ADMIN — Medication 500 MG: at 20:14

## 2018-05-12 RX ADMIN — GLIPIZIDE 5 MG: 5 TABLET ORAL at 08:30

## 2018-05-12 RX ADMIN — METOPROLOL TARTRATE 25 MG: 25 TABLET ORAL at 20:05

## 2018-05-12 RX ADMIN — FERROUS SULFATE TAB 325 MG (65 MG ELEMENTAL FE) 325 MG: 325 (65 FE) TAB at 08:29

## 2018-05-12 RX ADMIN — IPRATROPIUM BROMIDE AND ALBUTEROL SULFATE 1 AMPULE: 2.5; .5 SOLUTION RESPIRATORY (INHALATION) at 16:42

## 2018-05-12 RX ADMIN — INSULIN LISPRO 6 UNITS: 100 INJECTION, SOLUTION INTRAVENOUS; SUBCUTANEOUS at 08:30

## 2018-05-12 RX ADMIN — METFORMIN HYDROCHLORIDE 500 MG: 500 TABLET ORAL at 08:29

## 2018-05-12 RX ADMIN — INSULIN GLARGINE 22 UNITS: 100 INJECTION, SOLUTION SUBCUTANEOUS at 20:11

## 2018-05-12 ASSESSMENT — PAIN SCALES - GENERAL
PAINLEVEL_OUTOF10: 0
PAINLEVEL_OUTOF10: 8
PAINLEVEL_OUTOF10: 0
PAINLEVEL_OUTOF10: 0
PAINLEVEL_OUTOF10: 7
PAINLEVEL_OUTOF10: 9
PAINLEVEL_OUTOF10: 0

## 2018-05-12 NOTE — PROGRESS NOTES
POD# 4/3 Awake, alert. No complaints. Vitals:    05/11/18 2200 05/12/18 0322 05/12/18 0420 05/12/18 0713   BP:   135/79    Pulse:   98    Resp:   20    Temp:   98.7 °F (37.1 °C)    TempSrc:   Oral    SpO2: 93%  95% 95%   Weight:  233 lb 3.2 oz (105.8 kg)     Height:           Intake/Output Summary (Last 24 hours) at 05/12/18 0715  Last data filed at 05/12/18 4017   Gross per 24 hour   Intake             1680 ml   Output              762 ml   Net              918 ml     Recent Labs      05/11/18   0450  05/12/18   0535   HGB  9.0*  10.1*   HCT  27.1*  31.7*   BUN  15   --    CREATININE  0.8   --         PE  Cardiac: RRR  Lungs: decreased bases b ilat, upper air way mucus congestion  Chest incision clean.  Sternum stable, some costochondral click noted on left  Abd: Soft, +BS  Ext: Incisions intact, min edema, min bruising    A/P: Stable s/p CABG x 2, POD#4; s/p re-exploration, control of bleeding POD 3   Acute blood loss anemia secondary to open heart surgery- stable  Poor pain control, poor cough due to pain with some ST- will remove CT  Airway mucus congestion- pulm following  DM2- poorly controlled; on lantus, appetite coming back, will resume oral hypoglycemics at lower dose  On amio and BB  Wires cut  Lasix  Per dr Devyn Vazquez cont empiric abx   Increase activity as tolerated   Encourage incentive spirometry  This patient's case and care plan was discussed with the attending surgeon

## 2018-05-12 NOTE — PLAN OF CARE
Problem: Activity Intolerance:  Goal: Able to perform prescribed physical activity  Able to perform prescribed physical activity   Outcome: Met This Shift  Plan of care discussed with patient/family. Patient/family incorporated into plan of care. Goal: Ability to tolerate increased activity will improve  Ability to tolerate increased activity will improve   Outcome: Met This Shift  Plan of care discussed with patient/family. Patient/family incorporated into plan of care. Problem: Anxiety:  Goal: Level of anxiety will decrease  Level of anxiety will decrease   Outcome: Met This Shift  Plan of care discussed with patient/family. Patient/family incorporated into plan of care. Problem: Pain:  Goal: Control of acute pain  Control of acute pain   Outcome: Met This Shift  Plan of care discussed with patient/family. Patient/family incorporated into plan of care. Problem: Falls - Risk of:  Goal: Will remain free from falls  Will remain free from falls   Outcome: Met This Shift  Plan of care discussed with patient/family. Patient/family incorporated into plan of care.

## 2018-05-12 NOTE — PROGRESS NOTES
Date     05/12/2018    K 3.6 05/12/2018     05/12/2018    CO2 21 05/12/2018    BUN 16 05/12/2018    CREATININE 0.9 05/12/2018    GFRAA >60 05/12/2018    LABGLOM >60 05/12/2018    GLUCOSE 241 05/12/2018    PROT 4.6 05/09/2018    LABALBU 3.0 05/09/2018    CALCIUM 8.7 05/12/2018    BILITOT 0.7 05/09/2018    ALKPHOS 33 05/09/2018    AST 26 05/09/2018    ALT 13 05/09/2018     PT/INR:    Lab Results   Component Value Date    PROTIME 14.9 05/09/2018    INR 1.3 05/09/2018         CLINICAL ASSESMENT:  1. CABG x2   2. Reexploration for bleeding (increase drainage)  3. Normal lung function  4. LLL collapse improved  5. CAD  6. HTN  7. HLN  8. DM  9. BPH      PLAN: If needed the case was discussed with the care team    1. Keep Hb > 8   2. Check TSH - normal  3. Bronchodilators and pulmonary hygiene to continue  4. Wean oxygen   5. Will monitor effusion but was drained via OR  6. US to look at left effusion if + thoracentesis  7. Ok to transfer  8.  Dicussed with thoracic continue empiric Antibioitcs      Kelsey Goel DO, MPH, FCCP, [place], [place]

## 2018-05-12 NOTE — PROGRESS NOTES
PROGRESS  NOTE -- INTERNAL AND GERIATRIC MEDICINE  Janneth Hughes MD, FACP                                                               SUBJECTIVE:  Romie Donato  is alert, awake, and cooperative. Denies severe chest pain, nausea, dyspnea, emesis. Has controlled incisional pain   No abdominal pain. Tolerating diet. He is post ACB and re-entry for mediastinal bleeding  He does have SOB again  CXR shows atelectasis and small left pleural effusion    ROS:  Negative to a 10 system review except that mentioned in the HPI. Pertinent items are noted in HPI.          DIET:DIET CARDIAC; Carb Control: 3 carb choices (45 gms)/meal  Dietary Nutrition Supplements: Diabetic Oral Supplement  Dietary Nutrition Supplements: Wound Healing Oral Supplement    No Known Allergies    MEDICATION SIDE EFFECTS:none    SCHEDULED MEDS:   Current Facility-Administered Medications   Medication Dose Route Frequency Provider Last Rate Last Dose    metFORMIN (GLUCOPHAGE) tablet 500 mg  500 mg Oral BID  FRANCISCA Rees   500 mg at 05/12/18 5383    glipiZIDE (GLUCOTROL) tablet 5 mg  5 mg Oral QAM  FRANCISCA Rees   5 mg at 05/12/18 0830    furosemide (LASIX) injection 20 mg  20 mg Intravenous Once Becky Rees        metoprolol tartrate (LOPRESSOR) tablet 25 mg  25 mg Oral BID Dellar Garrick, APRN - CNP   25 mg at 05/12/18 0830    insulin lispro (HUMALOG) injection vial 0-18 Units  0-18 Units Subcutaneous 4x Daily  Aga Skinner APRN - CNP   6 Units at 05/12/18 0830    acetaminophen (TYLENOL) tablet 650 mg  650 mg Oral Q4H PRN Dellar Garrick, APRN - CNP        HYDROcodone-acetaminophen (NORCO) 5-325 MG per tablet 1 tablet  1 tablet Oral Q4H PRN Dellar Garrick, APRN - CNP   1 tablet at 05/11/18 2238    Or    HYDROcodone-acetaminophen (NORCO) 5-325 MG per tablet 2 tablet  2 tablet Oral Q4H PRN Dellar Garrick, APRN - CNP   2 tablet at 05/12/18 0315    magnesium hydroxide (MILK OF MAGNESIA) 400 MG/5ML suspension 30 mL  30 mL Oral Daily Carli [place], APRN - CNP   30 mL at 05/12/18 0829    docusate sodium (COLACE) capsule 100 mg  100 mg Oral BID Carli [place], APRN - CNP   100 mg at 05/12/18 0829    bisacodyl (DULCOLAX) suppository 10 mg  10 mg Rectal Daily PRN Carli Schuyler, APRN - CNP        aspirin EC tablet 81 mg  81 mg Oral Daily Aga Brody, APRN - CNP   81 mg at 05/12/18 0829    ferrous sulfate tablet 325 mg  325 mg Oral BID WC Aga Brody, APRN - CNP   325 mg at 05/12/18 0829    vitamin C (ASCORBIC ACID) tablet 500 mg  500 mg Oral BID Carli Schuyler, APRN - CNP   500 mg at 96/68/29 8606    folic acid (FOLVITE) tablet 1 mg  1 mg Oral Daily Carli Schuyler, APRN - CNP   1 mg at 05/12/18 0829    potassium chloride (KLOR-CON M) extended release tablet 20 mEq  20 mEq Oral PRN Carli Schuyler, APRN - CNP   60 mEq at 05/12/18 0910    ondansetron (ZOFRAN) injection 4 mg  4 mg Intravenous Q8H PRN Aga Skinner, APRN - CNP        glucose (GLUTOSE) 40 % oral gel 15 g  15 g Oral PRN Aga Skinner, APRN - CNP        dextrose 50 % solution 12.5 g  12.5 g Intravenous PRN Aga Skinner, APRN - CNP        glucagon (rDNA) injection 1 mg  1 mg Intramuscular PRN Aga Skinner, APRN - CNP        dextrose 5 % solution  100 mL/hr Intravenous PRN Carli Schuyler, APRN - CNP        amiodarone (CORDARONE) tablet 400 mg  400 mg Oral BID Carli Schuyler, APRN - CNP   400 mg at 05/12/18 0829    insulin glargine (LANTUS) injection vial 22 Units  22 Units Subcutaneous Nightly Aga Skinner, APRN - CNP   22 Units at 05/11/18 1945    atorvastatin (LIPITOR) tablet 40 mg  40 mg Oral Daily Lavinia Farmer DO   40 mg at 05/11/18 2023    tamsulosin (FLOMAX) capsule 0.4 mg  0.4 mg Oral BID Lavinia Farmer DO   0.4 mg at 05/12/18 0829    ipratropium-albuterol (DUONEB) nebulizer solution 1 ampule  1 ampule Inhalation Q4H WA Lavinia Farmer DO   1 ampule at 05/12/18 0923    mupirocin (BACTROBAN) 2 % ointment   Nasal BID Oljings Washburn, DO        magnesium oxide (MAG-OX) tablet 400 mg  400 mg Oral Daily Oljings Washburn, DO   400 mg at 05/12/18 5228    cefTRIAXone (ROCEPHIN) 1 g in sterile water 10 mL IV syringe  1 g Intravenous Q24H Oliva Deleon DO   1 g at 05/11/18 1948    budesonide (PULMICORT) nebulizer suspension 500 mcg  0.5 mg Nebulization BID Oliva Deleon DO   500 mcg at 05/12/18 5318       I have reviewed the patient's medications; see Medication Reconciliation. Current  Infusions   dextrose         Prn Medsacetaminophen, HYDROcodone 5 mg - acetaminophen **OR** HYDROcodone 5 mg - acetaminophen, bisacodyl, potassium chloride, ondansetron, glucose, dextrose, glucagon (rDNA), dextrose     OBJECTIVE:  Wt Readings from Last 3 Encounters:   05/12/18 233 lb 3.2 oz (105.8 kg)   05/05/18 220 lb (99.8 kg)   05/04/18 221 lb (100.2 kg)     Temp Readings from Last 3 Encounters:   05/12/18 97.1 °F (36.2 °C) (Axillary)   05/09/18 98.6 °F (37 °C)   05/08/18 98.2 °F (36.8 °C)     BP Readings from Last 3 Encounters:   05/12/18 (!) 148/77   05/08/18 116/70   05/05/18 119/74     Pulse Readings from Last 3 Encounters:   05/12/18 110   05/05/18 75   05/04/18 78     @LASTSAO2(3)@    General appearance: alert, appears stated age and cooperative  Skin: Skin color, texture, turgor normal. No rashes or lesions  HEENT: Head: Normocephalic, no lesions, without obvious abnormality.   Neck: no adenopathy, supple, symmetrical, trachea midline and thyroid not enlarged, symmetric, no tenderness/mass/nodules  Lungs: clear to auscultation bilaterally and diminished breath sounds bibasilar  Heart: S1S2 rapid but regular  Abdomen: soft, non-tender; bowel sounds normal; no masses,  no organomegaly  Extremities: extremities normal, atraumatic, no cyanosis or edema  Neurologic:  Alert, oriented, thought content appropriate    CBC:   Lab Results   Component Value Date    WBC 11.8 05/12/2018    RBC 3.42 05/12/2018    HGB 10.1 05/12/2018    HCT 31.7 05/12/2018    MCV 92.7 05/12/2018    MCH 29.5 05/12/2018    MCHC 31.9 05/12/2018    RDW 15.9 05/12/2018     05/12/2018    MPV 10.4 05/12/2018     Hemoglobin/Hematocrit:    Lab Results   Component Value Date    HGB 10.1 05/12/2018    HCT 31.7 05/12/2018       TELEMETRY: REVIEWED--Telemetry: Sinus    RADIOLOGY: REVIEWED AVAILABLE REPORT  XR CHEST PORTABLE   Final Result   Small left pleural effusion with adjacent atelectasis and/or   infiltrates, not significantly changed. XR CHEST PORTABLE   Final Result   1. Since examination dated 05/10/2018, interval increase in size of   the left-sided pleural effusion with worsening patchy left lower lobe   airspace opacity. Recommend clinical correlation. 2. Unchanged postoperative lines and tubes as described above. XR CHEST PORTABLE   Final Result   1. Since examination dated 05/09/2018, interval removal of the   endotracheal tube and nasogastric tube. 2. Relatively unchanged left-sided pleural effusion with associated   patchy left lower lobe airspace opacity. Recommend clinical   correlation. 3. Unchanged remaining postoperative lines and tubes as described   above. XR CHEST PORTABLE   Final Result   1. Right-sided central line/introducer. 2. Stable, enlarged cardiomediastinal silhouette. .   3. Left basilar infiltrate/pneumonia left pleural effusion. 4. Interval placement of mediastinal drains, left-sided chest tube,   endotracheal tube and NG tube as described above. 5. Suspected underlying mild central pulmonary vascular congestion. XR CHEST PORTABLE   Final Result   ALERT:  THIS IS AN ABNORMAL REPORT   1. Increased enlargement of the cardiomediastinal silhouette when   compared with the previous exam, particularly in the left hilar   region. . The possibility of underlying pericardial effusion or other   cardiac abnormalities is not excluded on the basis of this exam in   this postoperative patient, clinical correlation recommended. 2. Left basilar infiltrate/pneumonia left pleural effusion. 3. Interval removal of endotracheal tube and NG tube. 4. Possible underlying pulmonary edema. XR CHEST PORTABLE   Final Result   1. Left basilar infiltrate/pneumonia left pleural effusion. 2. Suspected underlying mild central pulmonary vascular congestion. 3. Distal NG tube not well identified. 4. Suspected underlying mild central pulmonary vascular congestion      XR CHEST PORTABLE   Final Result   1. Suspected underlying mild central pulmonary vascular congestion. 2. Left basilar infiltrate/pneumonia left pleural effusion. 3. Interval placement of right-sided internal jugular line introducer,   endotracheal tube, median sternotomy wires, left-sided chest tube and   mediastinal drains as described above. 4. The NG tube is not well identified. The distal tips not seen. US THORACENTESIS    (Results Pending)   XR CHEST STANDARD (2 VW)    (Results Pending)       CONSULTANT NOTES AND RECOMMENDATIONS REVIEWED:    ASSESSMENT:  S/P CABG  Friction rub vs Mumur -resolved with removal of the chest tubes  Pleural effusion left  Patient Active Problem List   Diagnosis    CAD in native artery    Respiratory insufficiency    Acute post-operative pain    Type 2 diabetes mellitus without complication, without long-term current use of insulin ([facility])    S/P CABG x 2    Pre-op testing    Acute blood loss anemia    Hypotension due to blood loss       PLAN:   Reviewed the labs and consults  May have thoracentesis today? Discussed with nursing.     See  Orders  Bina Fonseca  DIPLOMATE, [employer]  10:27 AM  5/12/2018

## 2018-05-13 ENCOUNTER — APPOINTMENT (OUTPATIENT)
Dept: GENERAL RADIOLOGY | Age: 73
DRG: 236 | End: 2018-05-13
Attending: THORACIC SURGERY (CARDIOTHORACIC VASCULAR SURGERY)
Payer: MEDICARE

## 2018-05-13 VITALS
RESPIRATION RATE: 18 BRPM | OXYGEN SATURATION: 94 % | HEART RATE: 97 BPM | TEMPERATURE: 97.9 F | WEIGHT: 232.2 LBS | DIASTOLIC BLOOD PRESSURE: 66 MMHG | SYSTOLIC BLOOD PRESSURE: 109 MMHG | BODY MASS INDEX: 35.19 KG/M2 | HEIGHT: 68 IN

## 2018-05-13 LAB
ANION GAP SERPL CALCULATED.3IONS-SCNC: 14 MMOL/L (ref 7–16)
BUN BLDV-MCNC: 17 MG/DL (ref 8–23)
CALCIUM SERPL-MCNC: 8.4 MG/DL (ref 8.6–10.2)
CHLORIDE BLD-SCNC: 96 MMOL/L (ref 98–107)
CO2: 24 MMOL/L (ref 22–29)
CREAT SERPL-MCNC: 0.9 MG/DL (ref 0.7–1.2)
GFR AFRICAN AMERICAN: >60
GFR NON-AFRICAN AMERICAN: >60 ML/MIN/1.73
GLUCOSE BLD-MCNC: 203 MG/DL (ref 74–109)
HCT VFR BLD CALC: 31.1 % (ref 37–54)
HEMOGLOBIN: 10.1 G/DL (ref 12.5–16.5)
MCH RBC QN AUTO: 30.1 PG (ref 26–35)
MCHC RBC AUTO-ENTMCNC: 32.5 % (ref 32–34.5)
MCV RBC AUTO: 92.8 FL (ref 80–99.9)
METER GLUCOSE: 187 MG/DL (ref 70–110)
METER GLUCOSE: 210 MG/DL (ref 70–110)
PDW BLD-RTO: 15.5 FL (ref 11.5–15)
PLATELET # BLD: 244 E9/L (ref 130–450)
PMV BLD AUTO: 9.7 FL (ref 7–12)
POTASSIUM REFLEX MAGNESIUM: 3.9 MMOL/L (ref 3.5–5)
RBC # BLD: 3.35 E12/L (ref 3.8–5.8)
SODIUM BLD-SCNC: 134 MMOL/L (ref 132–146)
WBC # BLD: 11.8 E9/L (ref 4.5–11.5)

## 2018-05-13 PROCEDURE — 97535 SELF CARE MNGMENT TRAINING: CPT

## 2018-05-13 PROCEDURE — G8978 MOBILITY CURRENT STATUS: HCPCS

## 2018-05-13 PROCEDURE — 80048 BASIC METABOLIC PNL TOTAL CA: CPT

## 2018-05-13 PROCEDURE — 6370000000 HC RX 637 (ALT 250 FOR IP): Performed by: NURSE PRACTITIONER

## 2018-05-13 PROCEDURE — 82962 GLUCOSE BLOOD TEST: CPT

## 2018-05-13 PROCEDURE — G8987 SELF CARE CURRENT STATUS: HCPCS

## 2018-05-13 PROCEDURE — 71046 X-RAY EXAM CHEST 2 VIEWS: CPT

## 2018-05-13 PROCEDURE — 97530 THERAPEUTIC ACTIVITIES: CPT

## 2018-05-13 PROCEDURE — 99233 SBSQ HOSP IP/OBS HIGH 50: CPT | Performed by: INTERNAL MEDICINE

## 2018-05-13 PROCEDURE — 6370000000 HC RX 637 (ALT 250 FOR IP): Performed by: PHYSICIAN ASSISTANT

## 2018-05-13 PROCEDURE — 85027 COMPLETE CBC AUTOMATED: CPT

## 2018-05-13 PROCEDURE — 36415 COLL VENOUS BLD VENIPUNCTURE: CPT

## 2018-05-13 PROCEDURE — 97162 PT EVAL MOD COMPLEX 30 MIN: CPT

## 2018-05-13 PROCEDURE — G8979 MOBILITY GOAL STATUS: HCPCS

## 2018-05-13 PROCEDURE — G8988 SELF CARE GOAL STATUS: HCPCS

## 2018-05-13 PROCEDURE — 6370000000 HC RX 637 (ALT 250 FOR IP): Performed by: THORACIC SURGERY (CARDIOTHORACIC VASCULAR SURGERY)

## 2018-05-13 PROCEDURE — 93798 PHYS/QHP OP CAR RHAB W/ECG: CPT

## 2018-05-13 PROCEDURE — 97166 OT EVAL MOD COMPLEX 45 MIN: CPT

## 2018-05-13 PROCEDURE — 94640 AIRWAY INHALATION TREATMENT: CPT

## 2018-05-13 RX ORDER — FOLIC ACID 1 MG/1
1 TABLET ORAL DAILY
Qty: 30 TABLET | Refills: 0 | Status: SHIPPED | OUTPATIENT
Start: 2018-05-14 | End: 2018-08-01 | Stop reason: ALTCHOICE

## 2018-05-13 RX ORDER — ASCORBIC ACID 500 MG
500 TABLET ORAL 2 TIMES DAILY
Qty: 60 TABLET | Refills: 0 | COMMUNITY
Start: 2018-05-13 | End: 2018-08-01 | Stop reason: ALTCHOICE

## 2018-05-13 RX ORDER — METOPROLOL TARTRATE 50 MG/1
50 TABLET, FILM COATED ORAL 2 TIMES DAILY
Status: DISCONTINUED | OUTPATIENT
Start: 2018-05-13 | End: 2018-05-13 | Stop reason: HOSPADM

## 2018-05-13 RX ORDER — HYDROCODONE BITARTRATE AND ACETAMINOPHEN 5; 325 MG/1; MG/1
TABLET ORAL
Qty: 42 TABLET | Refills: 0 | Status: SHIPPED | OUTPATIENT
Start: 2018-05-13 | End: 2018-05-21

## 2018-05-13 RX ORDER — AMOXICILLIN AND CLAVULANATE POTASSIUM 875; 125 MG/1; MG/1
1 TABLET, FILM COATED ORAL EVERY 12 HOURS SCHEDULED
Qty: 20 TABLET | Refills: 0 | Status: SHIPPED | OUTPATIENT
Start: 2018-05-13 | End: 2018-05-23

## 2018-05-13 RX ORDER — PSEUDOEPHEDRINE HCL 30 MG
100 TABLET ORAL 2 TIMES DAILY PRN
Qty: 20 CAPSULE | Refills: 0 | Status: SHIPPED | OUTPATIENT
Start: 2018-05-13

## 2018-05-13 RX ORDER — FERROUS SULFATE 325(65) MG
325 TABLET ORAL 2 TIMES DAILY WITH MEALS
Qty: 60 TABLET | Refills: 0 | Status: SHIPPED | OUTPATIENT
Start: 2018-05-13 | End: 2018-08-01 | Stop reason: ALTCHOICE

## 2018-05-13 RX ORDER — AMOXICILLIN AND CLAVULANATE POTASSIUM 875; 125 MG/1; MG/1
1 TABLET, FILM COATED ORAL EVERY 12 HOURS SCHEDULED
Status: DISCONTINUED | OUTPATIENT
Start: 2018-05-13 | End: 2018-05-13 | Stop reason: HOSPADM

## 2018-05-13 RX ORDER — AMIODARONE HYDROCHLORIDE 200 MG/1
TABLET ORAL
Qty: 44 TABLET | Refills: 0 | Status: SHIPPED | OUTPATIENT
Start: 2018-05-13 | End: 2018-08-01 | Stop reason: ALTCHOICE

## 2018-05-13 RX ORDER — METOPROLOL TARTRATE 50 MG/1
50 TABLET, FILM COATED ORAL 2 TIMES DAILY
Qty: 60 TABLET | Refills: 1 | Status: ON HOLD | OUTPATIENT
Start: 2018-05-13 | End: 2018-08-02

## 2018-05-13 RX ORDER — LANOLIN ALCOHOL/MO/W.PET/CERES
400 CREAM (GRAM) TOPICAL DAILY
Qty: 10 TABLET | Refills: 0 | Status: SHIPPED | OUTPATIENT
Start: 2018-05-14 | End: 2018-08-01 | Stop reason: ALTCHOICE

## 2018-05-13 RX ADMIN — METOPROLOL TARTRATE 50 MG: 25 TABLET ORAL at 08:26

## 2018-05-13 RX ADMIN — AMIODARONE HYDROCHLORIDE 400 MG: 200 TABLET ORAL at 08:24

## 2018-05-13 RX ADMIN — GLIPIZIDE 5 MG: 5 TABLET ORAL at 06:25

## 2018-05-13 RX ADMIN — DOCUSATE SODIUM 100 MG: 100 CAPSULE, LIQUID FILLED ORAL at 08:24

## 2018-05-13 RX ADMIN — IPRATROPIUM BROMIDE AND ALBUTEROL SULFATE 1 AMPULE: 2.5; .5 SOLUTION RESPIRATORY (INHALATION) at 13:24

## 2018-05-13 RX ADMIN — IPRATROPIUM BROMIDE AND ALBUTEROL SULFATE 1 AMPULE: 2.5; .5 SOLUTION RESPIRATORY (INHALATION) at 09:10

## 2018-05-13 RX ADMIN — HYDROCODONE BITARTRATE AND ACETAMINOPHEN 2 TABLET: 5; 325 TABLET ORAL at 08:25

## 2018-05-13 RX ADMIN — Medication 500 MG: at 08:24

## 2018-05-13 RX ADMIN — TAMSULOSIN HYDROCHLORIDE 0.4 MG: 0.4 CAPSULE ORAL at 08:24

## 2018-05-13 RX ADMIN — MAGNESIUM HYDROXIDE 30 ML: 400 SUSPENSION ORAL at 08:24

## 2018-05-13 RX ADMIN — ASPIRIN 81 MG: 81 TABLET, COATED ORAL at 08:25

## 2018-05-13 RX ADMIN — FERROUS SULFATE TAB 325 MG (65 MG ELEMENTAL FE) 325 MG: 325 (65 FE) TAB at 08:24

## 2018-05-13 RX ADMIN — INSULIN LISPRO 6 UNITS: 100 INJECTION, SOLUTION INTRAVENOUS; SUBCUTANEOUS at 11:57

## 2018-05-13 RX ADMIN — MUPIROCIN: 20 OINTMENT TOPICAL at 08:24

## 2018-05-13 RX ADMIN — FOLIC ACID 1 MG: 1 TABLET ORAL at 08:24

## 2018-05-13 RX ADMIN — AMOXICILLIN AND CLAVULANATE POTASSIUM 1 TABLET: 875; 125 TABLET, FILM COATED ORAL at 11:56

## 2018-05-13 RX ADMIN — BISACODYL 10 MG: 10 SUPPOSITORY RECTAL at 14:06

## 2018-05-13 RX ADMIN — INSULIN LISPRO 3 UNITS: 100 INJECTION, SOLUTION INTRAVENOUS; SUBCUTANEOUS at 06:25

## 2018-05-13 RX ADMIN — METFORMIN HYDROCHLORIDE 500 MG: 500 TABLET ORAL at 08:24

## 2018-05-13 RX ADMIN — BUDESONIDE 500 MCG: 0.5 SUSPENSION RESPIRATORY (INHALATION) at 09:11

## 2018-05-13 RX ADMIN — MAGNESIUM GLUCONATE 500 MG ORAL TABLET 400 MG: 500 TABLET ORAL at 08:24

## 2018-05-13 ASSESSMENT — PAIN DESCRIPTION - DESCRIPTORS: DESCRIPTORS: ACHING;DISCOMFORT

## 2018-05-13 ASSESSMENT — PAIN DESCRIPTION - PAIN TYPE: TYPE: SURGICAL PAIN

## 2018-05-13 ASSESSMENT — PAIN SCALES - GENERAL
PAINLEVEL_OUTOF10: 5
PAINLEVEL_OUTOF10: 0

## 2018-05-13 ASSESSMENT — PAIN DESCRIPTION - LOCATION: LOCATION: STERNUM

## 2018-05-13 NOTE — PROGRESS NOTES
Pulmonary [facility]    Department of Internal Medicine  Division of Pulmonary, Critical Care & Sleep Medicine    SUBJECTIVE:    On 2 liters  CXR improved  Good Cough  Reexploration completed with good results: POD 3/4  Up in chair    SURGICAL NOTE: a significant amount of clot noted near the left hilum in the area of the course of the mammary artery and the incision in the pericardium. All this clot was removed. Bloody effusion was also noted which was removed via suction. Upon visual inspection throughout the chest, there was no obvious venous bleeding. OBJECTIVE:  Vitals:    05/13/18 0622 05/13/18 0715 05/13/18 0819 05/13/18 0901   BP:   131/64    Pulse:   102    Resp:   18    Temp:   98.2 °F (36.8 °C)    TempSrc:       SpO2:  95% 92% 95%   Weight: 232 lb 3.2 oz (105.3 kg)      Height:         Constitutional: Alert,     EENT: EOMI EL. MMM. No icterus. No thrush. Neck: No thyromegaly. No elevated JVP. Trachea was midline. Respiratory: Symmetrical.  BS were clear.  + egophony  Cardiovascular: Regular, Sternotomy,     Pulses:  Equal bilaterally. Abdomen: Soft without organomegaly. No rebound, rigidity. No guarding. Lymphatic: No lymphadenopathy. Musculoskeletal: Without weakness  Extremities:  + lower extremity edema. Reflexes appear adequate. Skin:  Warm and dry. Neurological/Psychiatric: No acute psychosis. Cranial nerves II-XII are intact. DATA:    Monitor Strips:  Reviewed & discusses with technical team. No changes noted.     RADIOLOGY:  Films were read/reviewed/discussed with radiology shows haziness left lung at base. hypoventiltion      CBC:   Lab Results   Component Value Date    WBC 11.8 05/13/2018    RBC 3.35 05/13/2018    HGB 10.1 05/13/2018    HCT 31.1 05/13/2018    MCV 92.8 05/13/2018    MCH 30.1 05/13/2018    MCHC 32.5 05/13/2018    RDW 15.5 05/13/2018     05/13/2018    MPV 9.7 05/13/2018     CMP:    Lab Results   Component Value Date     05/13/2018    K 3.9 05/13/2018    CL 96 05/13/2018    CO2 24 05/13/2018    BUN 17 05/13/2018    CREATININE 0.9 05/13/2018    GFRAA >60 05/13/2018    LABGLOM >60 05/13/2018    GLUCOSE 203 05/13/2018    PROT 4.6 05/09/2018    LABALBU 3.0 05/09/2018    CALCIUM 8.4 05/13/2018    BILITOT 0.7 05/09/2018    ALKPHOS 33 05/09/2018    AST 26 05/09/2018    ALT 13 05/09/2018     PT/INR:    Lab Results   Component Value Date    PROTIME 14.9 05/09/2018    INR 1.3 05/09/2018         CLINICAL ASSESMENT:  1. CABG x2   2. Reexploration for bleeding (increase drainage)  3. Normal lung function  4. LLL collapse improved  5. CAD  6. HTN  7. HLN  8. DM  9. BPH      PLAN: If needed the case was discussed with the care team    1. Ok to discharge  2. Keep Hb > 8   3. Check TSH - normal  4.  Bronchodilators duonebs for 4 weeks      Bentley De La Garza DO, MPH, FCCP, [facility] [place]

## 2018-05-13 NOTE — CARE COORDINATION
5/13/2018 social work transition of care/discharge planning  Sw consult noted in [facility] for post op. Sw spoke with patient about hhc choices. Patient did not have a preference. Sw made referral to [facility],faxed face and post op homecare protocol to [phone]. Please call Kenton Han [address] [place] [address] on call [phone]) when patient if leaving hospital today. [facility] has discharge order, but needs to have a bowel movement,per nursing. Patient stated that he will be going to his sister's house for a couple days at discharge. Patient was unsure of [facility] was encouraged to have address for home care. [facility] will follow.   Electronically signed by PEPITO Matute on 5/13/2018 at 12:35 PM

## 2018-05-13 NOTE — PROGRESS NOTES
Physical Therapy    Initial Assessment     Name: Lux Dempsey  : 1945  MRN: [id number]    Date of Service: 2018    Evaluating PT: Alexander Caab PT, DPT [id number]    Room #:  5837/2972-K    Diagnosis: CAD in native artery  Precautions: Fall risk, sternal precautions  Procedures: CABG x2 (), Mediastinal reexploration s/p CABG ()    Pt lives with wife in a single story house with 2 stairs and 1 rail(s) to enter. Bed is on the first floor and bath is on the first floor. Pt ambulated without AD Independent prior to admission. Wife is currently at rehab. Pt will be living with sister in a single story house with 1 stair and no rails to enter. 2 stairs with no rails into living room where pt will be sleeping in recliner. Arline Tarango is on main level.  supervision and assist will be available at discharge. Initial Evaluation  Date: 18 Treatment Date: Short Term/ Long Term   Goals   AM-PAC 6 Clicks 70/52     Was pt agreeable to Eval/treatment? Yes     Does pt have pain? Chest pain when coughing     Bed Mobility  Rolling: NT  Supine to sit: NT  Sit to supine: NT  Scooting: SBA toward edge of chair  Rolling: Independent  Supine to sit: Independent  Sit to supine: Independent  Scooting: Independent   Transfers Sit to stand: SBA  Stand to sit: SBA  Stand pivot: SBA without AD  Sit to stand: Independent  Stand to sit: Independent  Stand pivot: Independent   Ambulation   100 feet x2 reps without AD with SBA  400 feet Independent   Stair negotiation: 4 steps with light touch on rail for balance with SBA  4 steps with no rail(s) Independent   ROM B UE: Refer to OT note  B LE: WFL     Strength B UE: Refer to OT note  B LE: WFL     Balance Sitting EOB: NT  Dynamic standing: SBA without AD  Sitting EOB: Independent  Dynamic standing: Independent     Pt is A & O x: 4 to person, place, month, and year. Sensation: NT  Edema: Unremarkable.     ASSESSMENT    Patient education  Pt educated on sternal precautions. Patient response to education:   Pt verbalized understanding Pt demonstrated skill Pt requires further education in this area   Yes Yes Reinforce     Comments:   Pt was seated in reclining chair upon room entry, agreeable to PT evaluation with OT collaboration. Pt was educated on sternal precautions prior to activity, pt verbalized understanding. Pt was educated on proper scooting technique to adhere to sternal precautions, pt verbalized understanding and demonstrated proper technique. Pt ambulates with normal gait speed, reciprocal step pattern, and good steadiness. Pt was cued to slow down gait speed and to conserve energy while ambulating in hallway. Pt negotiated steps with reciprocal step pattern while ascending, and non-reciprocal step pattern while descending. Pt was permitted to use light touch on railing for balance. Pt reported SOB while ambulated back to room and required several minutes standing rest. Pt ambulated back to room and performed stand <> sit transfer from commode. Pt was returned to reclining chair at bedside at conclusion of session. Pt was educated on proper technique during bed mobility transfers, pt verbalized understanding. Pt was extremely SOB following all activity, however O2 sat remained at or above 93% on RA with HR ranging from 108-113 bpm. Pt was educated on pursed lip breathing technique when feeling SOB. Pt was left seated in bedside chair with all needs met at conclusion of session. Pts/ family goals   To return home. Patient and or family understand(s) diagnosis, prognosis, and plan of care:  Yes. PLAN  PT care will be provided in accordance with the objectives noted above. Whenever appropriate, clear delegation orders will be provided for nursing staff. Exercises and functional mobility practice will be used as well as appropriate assistive devices or modalities to obtain goals.  Patient and family education will also be administered as needed. Frequency of treatments: 2-5x/week x 1-2 days.     Time in: 1245  Time out: [facility], [place], [place]  [id number]

## 2018-05-13 NOTE — PROGRESS NOTES
Occupational Therapy  OCCUPATIONAL THERAPY INITIAL EVALUATION      Date:2018  Patient Name: Inga Julio  MRN: [id number]  : 1945  Room: [address] [place], [place] [address]     Evaluating OT: Taz Balbuena OTR/L #[id number]      Recommended Adaptive Equipment: tbd   AM-PAC Daily Activity Raw Score:   G-Code: CK    Diagnosis: CAD in native artery  Surgery: CABG x2 18; re-exploration 18    Past Medical History:   Past Medical History:   Diagnosis Date    BPH (benign prostatic hyperplasia)     ricchuiti    CAD (coronary artery disease)     Diabetes mellitus ([facility] [address]     Hyperlipidemia       Precautions: Fall risk, sternal precautions     Home Living: Pt lives with wife in a 1 story home with 2 ELISHA and 1 hand rail  (wife currently at rehab facility). Plans to return to sisters in a 1 story home with 1 ELISHA and 2 steps in house    Bathroom setup: tub/shower combo  Equipment owned: none    Prior Level of Function: Independent with ADLs; Independent with IADLs; ambulated without AD  Driving: yes  Occupation: drives for [employer]    Pain Level: pt denies pain this session     Cognition: oriented x 4; follows 2 step directions.    good  Problem solving skills  good  Memory   good  Sequencing   good safety    Sensory:   Hearing: wfl  Vision: wfl    Glasses: yes [x] no [] reading []      UE Assessment:  Hand Dominance: Right [x]  Left []     Strength ROM Additional Info:    RUE  Wfl, formal MMT deferred due to sternal precautions  wfl (within sternal precautions) good  and wfl FMC/dexterity noted during ADL tasks     LUE Wfl, formal MMT deferred due to sternal precautions  wfl (within sternal precautions) good  and wfl FMC/dexterity noted during ADL tasks     Sensation:wfl  Tone: wfl  Edema:none noted      Functional Assessment:   Initial Status  Comments   Feeding  Set-up    Grooming  SBA standing   Upper Body Dressing Min A     Lower Body Dressing Max A    Bathing NT    Toileting  SBA Simulated toileting task   Bed retraining [x]   Equipment needs []   Neuromuscular re-education [] Energy Conservation Techniques [x]  Functional Transfer training [x] Patient and/or Family Education [x]  Functional Mobility training [x]  Environmental Modifications []  Cognitive re-training []   Compensatory techniques for ADLs [x]  Splinting Needs []   Positioning to improve overall function []  Other: []      Rehab Potential: good    Patient / Family Goal: return to sisters    Short term goals  Time Frame: 2-4 days  STG 1 :  Pt will demonstrate good understanding of sternal precautions impacting ADL completion. STG 2 : Pt will complete UB self-care tasks with mod I  STG 3 :  Pt will complete LB self-care tasks with mod I while utilizing AE prn  STG 4 :  Pt will complete functional transfers with mod I  STG 5 :  Pt will complete functional ambulation / item retrieval task with mod I   Pt participated in establishment of the following goals      Patient and/or family understands diagnosis, prognosis and plan of care: yes    Pt educated on sternal precautions, energy conservation techniques, modified self-care tasks, safety and OT POC. Patient  verbalized fair understanding of education, requires additional education       [] Malnutrition indicators have been identified and nursing has been notified to ensure a dietitian consult is ordered.      AM-PAC Daily Activity Inpatient   How much help for putting on and taking off regular lower body clothing?: A Lot  How much help for Bathing?: A Lot  How much help for Toileting?: A Little  How much help for putting on and taking off regular upper body clothing?: A Little  How much help for taking care of personal grooming?: A Little  How much help for eating meals?: None  AM-PAC Inpatient Daily Activity Raw Score: 17  AM-PAC Inpatient ADL T-Scale Score : 37.26  ADL Inpatient CMS 0-100% Score: 50.11  ADL Inpatient CMS G-Code Modifier : CK     mod Evaluation completed +  Timed Treatment: 14 minutes  Tx

## 2018-05-13 NOTE — PLAN OF CARE
Problem: Anxiety:  Goal: Level of anxiety will decrease  Level of anxiety will decrease   Outcome: Met This Shift      Problem: Falls - Risk of:  Goal: Will remain free from falls  Will remain free from falls   Outcome: Met This Shift      Problem: Skin Integrity:  Goal: Will show no infection signs and symptoms  Will show no infection signs and symptoms   Outcome: Met This Shift

## 2018-05-13 NOTE — PROGRESS NOTES
BID    insulin glargine  22 Units Subcutaneous Nightly    atorvastatin  40 mg Oral Daily    tamsulosin  0.4 mg Oral BID    ipratropium-albuterol  1 ampule Inhalation Q4H WA    mupirocin   Nasal BID    magnesium oxide  400 mg Oral Daily    cefTRIAXone (ROCEPHIN) IV  1 g Intravenous Q24H    budesonide  0.5 mg Nebulization BID     Continuous Infusions:   dextrose       PRN Meds:.acetaminophen, HYDROcodone 5 mg - acetaminophen **OR** HYDROcodone 5 mg - acetaminophen, bisacodyl, potassium chloride, ondansetron, glucose, dextrose, glucagon (rDNA), dextrose    Allergies: Patient has no known allergies. Labs: Recent Labs      05/11/18 0450 05/12/18   0535  05/13/18   0630   WBC  11.5  11.8*  11.8*   HGB  9.0*  10.1*  10.1*   HCT  27.1*  31.7*  31.1*   MCV  92.2  92.7  92.8   PLT  137  220  244       Labs: Recent Labs      05/11/18 0450 05/12/18   0535  05/13/18   0630   NA  139  137  134   K  3.8  3.6  3.9   CL  105  100  96*   CO2  26  21*  24   BUN  15  16  17   CREATININE  0.8  0.9  0.9       Labs: No results for input(s): CKTOTAL, CKMB, CKMBINDEX, TROPONINI in the last 72 hours. Labs: No results for input(s): BNP in the last 72 hours. Labs: No results for input(s): CHOL, HDL, TRIG in the last 72 hours. Invalid input(s): CHOLHDLR, LDLCALCU    Labs: No results for input(s): PROT, INR in the last 72 hours. Review of systems: No reported significant weight gain or weight loss. no dysuria or frequency, no dizziness, falls or trauma, no change in bowel or bladder habits, no hematochezia, hemoptysis or hematuria. No fevers, chills, nausea or vomiting reported. No significant wheezing or sputum production. No headache or visual changes. The remainder of the 10 review of systems otherwise negative. Exam      General: Patient comfortable in no distress and currently denies any chest pain. HEENT: Face symmetrical and no apparent cranial nerve deficit.      Neck: No jugular

## 2018-06-11 LAB
FUNGUS (MYCOLOGY) CULTURE: ABNORMAL
FUNGUS (MYCOLOGY) CULTURE: ABNORMAL
FUNGUS STAIN: ABNORMAL
ORGANISM: ABNORMAL

## 2018-06-12 ENCOUNTER — OFFICE VISIT (OUTPATIENT)
Dept: CARDIOTHORACIC SURGERY | Age: 73
End: 2018-06-12

## 2018-06-12 VITALS
WEIGHT: 209 LBS | BODY MASS INDEX: 31.67 KG/M2 | HEIGHT: 68 IN | DIASTOLIC BLOOD PRESSURE: 63 MMHG | HEART RATE: 102 BPM | SYSTOLIC BLOOD PRESSURE: 112 MMHG

## 2018-06-12 DIAGNOSIS — Z95.1 S/P CABG X 2: Primary | ICD-10-CM

## 2018-06-12 PROCEDURE — 99024 POSTOP FOLLOW-UP VISIT: CPT | Performed by: THORACIC SURGERY (CARDIOTHORACIC VASCULAR SURGERY)

## 2018-06-13 ASSESSMENT — ENCOUNTER SYMPTOMS
SHORTNESS OF BREATH: 0
COUGH: 0

## 2018-06-26 LAB
AFB CULTURE (MYCOBACTERIA): NORMAL
AFB SMEAR: NORMAL

## 2018-08-01 ENCOUNTER — HOSPITAL ENCOUNTER (INPATIENT)
Age: 73
LOS: 1 days | Discharge: HOME | DRG: 315 | End: 2018-08-02
Attending: EMERGENCY MEDICINE | Admitting: FAMILY MEDICINE
Payer: MEDICARE

## 2018-08-01 ENCOUNTER — APPOINTMENT (OUTPATIENT)
Dept: GENERAL RADIOLOGY | Age: 73
DRG: 315 | End: 2018-08-01
Payer: MEDICARE

## 2018-08-01 DIAGNOSIS — I95.1 ORTHOSTATIC HYPOTENSION: Primary | ICD-10-CM

## 2018-08-01 DIAGNOSIS — E87.20 LACTIC ACIDOSIS: ICD-10-CM

## 2018-08-01 DIAGNOSIS — R55 NEAR SYNCOPE: ICD-10-CM

## 2018-08-01 DIAGNOSIS — J90 PLEURAL EFFUSION ON LEFT: ICD-10-CM

## 2018-08-01 PROBLEM — I10 HYPERTENSION: Chronic | Status: ACTIVE | Noted: 2018-08-01

## 2018-08-01 PROBLEM — N40.0 BPH (BENIGN PROSTATIC HYPERPLASIA): Chronic | Status: ACTIVE | Noted: 2018-08-01

## 2018-08-01 PROBLEM — E78.5 HLD (HYPERLIPIDEMIA): Status: ACTIVE | Noted: 2018-08-01

## 2018-08-01 PROBLEM — I95.9 HYPOTENSION: Status: ACTIVE | Noted: 2018-08-01

## 2018-08-01 LAB
ALBUMIN SERPL-MCNC: 3.2 G/DL (ref 3.5–5.2)
ALP BLD-CCNC: 108 U/L (ref 40–129)
ALT SERPL-CCNC: 22 U/L (ref 0–40)
ANION GAP SERPL CALCULATED.3IONS-SCNC: 17 MMOL/L (ref 7–16)
APTT: 29.2 SEC (ref 24.5–35.1)
AST SERPL-CCNC: 21 U/L (ref 0–39)
BACTERIA: ABNORMAL /HPF
BASOPHILS ABSOLUTE: 0.07 E9/L (ref 0–0.2)
BASOPHILS RELATIVE PERCENT: 0.7 % (ref 0–2)
BILIRUB SERPL-MCNC: 0.6 MG/DL (ref 0–1.2)
BILIRUBIN DIRECT: <0.2 MG/DL (ref 0–0.3)
BILIRUBIN URINE: NEGATIVE
BILIRUBIN, INDIRECT: ABNORMAL MG/DL (ref 0–1)
BLOOD, URINE: NEGATIVE
BUN BLDV-MCNC: 16 MG/DL (ref 8–23)
CALCIUM SERPL-MCNC: 9.5 MG/DL (ref 8.6–10.2)
CHLORIDE BLD-SCNC: 96 MMOL/L (ref 98–107)
CLARITY: CLEAR
CO2: 20 MMOL/L (ref 22–29)
COLOR: YELLOW
CREAT SERPL-MCNC: 1 MG/DL (ref 0.7–1.2)
EKG ATRIAL RATE: 65 BPM
EKG P AXIS: 39 DEGREES
EKG P-R INTERVAL: 186 MS
EKG Q-T INTERVAL: 456 MS
EKG QRS DURATION: 96 MS
EKG QTC CALCULATION (BAZETT): 474 MS
EKG R AXIS: 31 DEGREES
EKG T AXIS: 73 DEGREES
EKG VENTRICULAR RATE: 65 BPM
EOSINOPHILS ABSOLUTE: 0.12 E9/L (ref 0.05–0.5)
EOSINOPHILS RELATIVE PERCENT: 1.2 % (ref 0–6)
EPITHELIAL CELLS, UA: ABNORMAL /HPF
GFR AFRICAN AMERICAN: >60
GFR NON-AFRICAN AMERICAN: >60 ML/MIN/1.73
GLUCOSE BLD-MCNC: 169 MG/DL (ref 74–109)
GLUCOSE URINE: NEGATIVE MG/DL
HCT VFR BLD CALC: 38.4 % (ref 37–54)
HEMOGLOBIN: 12.1 G/DL (ref 12.5–16.5)
IMMATURE GRANULOCYTES #: 0.04 E9/L
IMMATURE GRANULOCYTES %: 0.4 % (ref 0–5)
INR BLD: 1.2
KETONES, URINE: ABNORMAL MG/DL
LACTIC ACID: 1.5 MMOL/L (ref 0.5–2.2)
LACTIC ACID: 3.7 MMOL/L (ref 0.5–2.2)
LEUKOCYTE ESTERASE, URINE: NEGATIVE
LYMPHOCYTES ABSOLUTE: 1.71 E9/L (ref 1.5–4)
LYMPHOCYTES RELATIVE PERCENT: 17.4 % (ref 20–42)
MAGNESIUM: 1.8 MG/DL (ref 1.6–2.6)
MCH RBC QN AUTO: 26.8 PG (ref 26–35)
MCHC RBC AUTO-ENTMCNC: 31.5 % (ref 32–34.5)
MCV RBC AUTO: 85.1 FL (ref 80–99.9)
METER GLUCOSE: 138 MG/DL (ref 70–110)
METER GLUCOSE: 159 MG/DL (ref 70–110)
METER GLUCOSE: 99 MG/DL (ref 70–110)
MONOCYTES ABSOLUTE: 0.98 E9/L (ref 0.1–0.95)
MONOCYTES RELATIVE PERCENT: 10 % (ref 2–12)
MUCUS: PRESENT
NEUTROPHILS ABSOLUTE: 6.92 E9/L (ref 1.8–7.3)
NEUTROPHILS RELATIVE PERCENT: 70.3 % (ref 43–80)
NITRITE, URINE: NEGATIVE
PDW BLD-RTO: 15.5 FL (ref 11.5–15)
PH UA: 6 (ref 5–9)
PLATELET # BLD: 400 E9/L (ref 130–450)
PMV BLD AUTO: 9.6 FL (ref 7–12)
POTASSIUM SERPL-SCNC: 4.3 MMOL/L (ref 3.5–5)
PRO-BNP: 354 PG/ML (ref 0–125)
PROTEIN UA: ABNORMAL MG/DL
PROTHROMBIN TIME: 13.7 SEC (ref 9.3–12.4)
RBC # BLD: 4.51 E12/L (ref 3.8–5.8)
RBC UA: ABNORMAL /HPF (ref 0–2)
SODIUM BLD-SCNC: 133 MMOL/L (ref 132–146)
SPECIFIC GRAVITY UA: >=1.03 (ref 1–1.03)
TOTAL PROTEIN: 7.1 G/DL (ref 6.4–8.3)
TROPONIN: <0.01 NG/ML (ref 0–0.03)
UROBILINOGEN, URINE: 0.2 E.U./DL
WBC # BLD: 9.8 E9/L (ref 4.5–11.5)
WBC UA: ABNORMAL /HPF (ref 0–5)

## 2018-08-01 PROCEDURE — APPSS45 APP SPLIT SHARED TIME 31-45 MINUTES: Performed by: NURSE PRACTITIONER

## 2018-08-01 PROCEDURE — 2580000003 HC RX 258: Performed by: NURSE PRACTITIONER

## 2018-08-01 PROCEDURE — 36415 COLL VENOUS BLD VENIPUNCTURE: CPT

## 2018-08-01 PROCEDURE — 80048 BASIC METABOLIC PNL TOTAL CA: CPT

## 2018-08-01 PROCEDURE — 83735 ASSAY OF MAGNESIUM: CPT

## 2018-08-01 PROCEDURE — 83880 ASSAY OF NATRIURETIC PEPTIDE: CPT

## 2018-08-01 PROCEDURE — 2060000000 HC ICU INTERMEDIATE R&B

## 2018-08-01 PROCEDURE — 2580000003 HC RX 258: Performed by: EMERGENCY MEDICINE

## 2018-08-01 PROCEDURE — 6370000000 HC RX 637 (ALT 250 FOR IP): Performed by: NURSE PRACTITIONER

## 2018-08-01 PROCEDURE — 94664 DEMO&/EVAL PT USE INHALER: CPT

## 2018-08-01 PROCEDURE — 93005 ELECTROCARDIOGRAM TRACING: CPT | Performed by: EMERGENCY MEDICINE

## 2018-08-01 PROCEDURE — 85025 COMPLETE CBC W/AUTO DIFF WBC: CPT

## 2018-08-01 PROCEDURE — 81001 URINALYSIS AUTO W/SCOPE: CPT

## 2018-08-01 PROCEDURE — 94761 N-INVAS EAR/PLS OXIMETRY MLT: CPT

## 2018-08-01 PROCEDURE — 99285 EMERGENCY DEPT VISIT HI MDM: CPT

## 2018-08-01 PROCEDURE — 84484 ASSAY OF TROPONIN QUANT: CPT

## 2018-08-01 PROCEDURE — 99223 1ST HOSP IP/OBS HIGH 75: CPT | Performed by: INTERNAL MEDICINE

## 2018-08-01 PROCEDURE — 83605 ASSAY OF LACTIC ACID: CPT

## 2018-08-01 PROCEDURE — 6360000002 HC RX W HCPCS: Performed by: NURSE PRACTITIONER

## 2018-08-01 PROCEDURE — 6370000000 HC RX 637 (ALT 250 FOR IP): Performed by: INTERNAL MEDICINE

## 2018-08-01 PROCEDURE — 71045 X-RAY EXAM CHEST 1 VIEW: CPT

## 2018-08-01 PROCEDURE — 80076 HEPATIC FUNCTION PANEL: CPT

## 2018-08-01 PROCEDURE — 82962 GLUCOSE BLOOD TEST: CPT

## 2018-08-01 PROCEDURE — 85730 THROMBOPLASTIN TIME PARTIAL: CPT

## 2018-08-01 PROCEDURE — 85610 PROTHROMBIN TIME: CPT

## 2018-08-01 RX ORDER — DOCUSATE SODIUM 100 MG/1
100 CAPSULE, LIQUID FILLED ORAL 2 TIMES DAILY PRN
Status: DISCONTINUED | OUTPATIENT
Start: 2018-08-01 | End: 2018-08-02 | Stop reason: HOSPADM

## 2018-08-01 RX ORDER — SODIUM CHLORIDE 9 MG/ML
INJECTION, SOLUTION INTRAVENOUS CONTINUOUS
Status: DISCONTINUED | OUTPATIENT
Start: 2018-08-01 | End: 2018-08-02 | Stop reason: HOSPADM

## 2018-08-01 RX ORDER — TAMSULOSIN HYDROCHLORIDE 0.4 MG/1
0.4 CAPSULE ORAL DAILY
Status: DISCONTINUED | OUTPATIENT
Start: 2018-08-01 | End: 2018-08-02 | Stop reason: HOSPADM

## 2018-08-01 RX ORDER — SODIUM CHLORIDE 0.9 % (FLUSH) 0.9 %
10 SYRINGE (ML) INJECTION PRN
Status: DISCONTINUED | OUTPATIENT
Start: 2018-08-01 | End: 2018-08-02 | Stop reason: HOSPADM

## 2018-08-01 RX ORDER — SODIUM CHLORIDE 0.9 % (FLUSH) 0.9 %
10 SYRINGE (ML) INJECTION EVERY 12 HOURS SCHEDULED
Status: DISCONTINUED | OUTPATIENT
Start: 2018-08-01 | End: 2018-08-02 | Stop reason: HOSPADM

## 2018-08-01 RX ORDER — BUPROPION HYDROCHLORIDE 150 MG/1
1 TABLET ORAL EVERY MORNING
Refills: 0 | Status: ON HOLD | COMMUNITY
Start: 2018-07-17 | End: 2018-08-27 | Stop reason: HOSPADM

## 2018-08-01 RX ORDER — ONDANSETRON 2 MG/ML
4 INJECTION INTRAMUSCULAR; INTRAVENOUS EVERY 6 HOURS PRN
Status: DISCONTINUED | OUTPATIENT
Start: 2018-08-01 | End: 2018-08-02 | Stop reason: HOSPADM

## 2018-08-01 RX ORDER — ASPIRIN 81 MG/1
81 TABLET, CHEWABLE ORAL EVERY EVENING
Status: DISCONTINUED | OUTPATIENT
Start: 2018-08-01 | End: 2018-08-02 | Stop reason: HOSPADM

## 2018-08-01 RX ORDER — FLUOXETINE HYDROCHLORIDE 40 MG/1
1 CAPSULE ORAL EVERY MORNING
Refills: 0 | Status: ON HOLD | COMMUNITY
Start: 2018-07-17 | End: 2018-08-27

## 2018-08-01 RX ORDER — SODIUM CHLORIDE 9 MG/ML
1000 INJECTION, SOLUTION INTRAVENOUS ONCE
Status: COMPLETED | OUTPATIENT
Start: 2018-08-01 | End: 2018-08-01

## 2018-08-01 RX ORDER — BUPROPION HYDROCHLORIDE 150 MG/1
150 TABLET ORAL EVERY MORNING
Status: DISCONTINUED | OUTPATIENT
Start: 2018-08-01 | End: 2018-08-02 | Stop reason: HOSPADM

## 2018-08-01 RX ORDER — 0.9 % SODIUM CHLORIDE 0.9 %
1000 INTRAVENOUS SOLUTION INTRAVENOUS ONCE
Status: COMPLETED | OUTPATIENT
Start: 2018-08-01 | End: 2018-08-01

## 2018-08-01 RX ORDER — DEXTROSE MONOHYDRATE 25 G/50ML
12.5 INJECTION, SOLUTION INTRAVENOUS PRN
Status: DISCONTINUED | OUTPATIENT
Start: 2018-08-01 | End: 2018-08-02 | Stop reason: HOSPADM

## 2018-08-01 RX ORDER — FLUOXETINE HYDROCHLORIDE 20 MG/1
40 CAPSULE ORAL EVERY MORNING
Status: DISCONTINUED | OUTPATIENT
Start: 2018-08-01 | End: 2018-08-02 | Stop reason: HOSPADM

## 2018-08-01 RX ORDER — ACETAMINOPHEN 325 MG/1
650 TABLET ORAL EVERY 4 HOURS PRN
Status: DISCONTINUED | OUTPATIENT
Start: 2018-08-01 | End: 2018-08-02 | Stop reason: HOSPADM

## 2018-08-01 RX ORDER — ATORVASTATIN CALCIUM 40 MG/1
40 TABLET, FILM COATED ORAL EVERY EVENING
Status: DISCONTINUED | OUTPATIENT
Start: 2018-08-01 | End: 2018-08-02 | Stop reason: HOSPADM

## 2018-08-01 RX ORDER — NICOTINE POLACRILEX 4 MG
15 LOZENGE BUCCAL PRN
Status: DISCONTINUED | OUTPATIENT
Start: 2018-08-01 | End: 2018-08-02 | Stop reason: HOSPADM

## 2018-08-01 RX ORDER — DEXTROSE MONOHYDRATE 50 MG/ML
100 INJECTION, SOLUTION INTRAVENOUS PRN
Status: DISCONTINUED | OUTPATIENT
Start: 2018-08-01 | End: 2018-08-02 | Stop reason: HOSPADM

## 2018-08-01 RX ADMIN — ENOXAPARIN SODIUM 40 MG: 40 INJECTION, SOLUTION INTRAVENOUS; SUBCUTANEOUS at 16:00

## 2018-08-01 RX ADMIN — ACETAMINOPHEN 650 MG: 325 TABLET ORAL at 23:03

## 2018-08-01 RX ADMIN — Medication 10 ML: at 19:34

## 2018-08-01 RX ADMIN — IPRATROPIUM BROMIDE AND ALBUTEROL 1 PUFF: 20; 100 SPRAY, METERED RESPIRATORY (INHALATION) at 19:10

## 2018-08-01 RX ADMIN — SODIUM CHLORIDE: 9 INJECTION, SOLUTION INTRAVENOUS at 16:01

## 2018-08-01 RX ADMIN — ATORVASTATIN CALCIUM 40 MG: 40 TABLET, FILM COATED ORAL at 17:52

## 2018-08-01 RX ADMIN — SODIUM CHLORIDE 1000 ML: 9 INJECTION, SOLUTION INTRAVENOUS at 10:51

## 2018-08-01 RX ADMIN — ASPIRIN 81 MG CHEWABLE TABLET 81 MG: 81 TABLET CHEWABLE at 17:52

## 2018-08-01 RX ADMIN — SODIUM CHLORIDE 1000 ML: 9 INJECTION, SOLUTION INTRAVENOUS at 09:22

## 2018-08-01 ASSESSMENT — ENCOUNTER SYMPTOMS
VISUAL CHANGE: 0
EYE PAIN: 0
SHORTNESS OF BREATH: 1
NAUSEA: 0
COUGH: 0
BLOOD IN STOOL: 0
RHINORRHEA: 0
COLOR CHANGE: 0
CHEST TIGHTNESS: 0
VOMITING: 0
SORE THROAT: 0
BACK PAIN: 0
RECTAL BLEEDING: 0
DIFFICULTY BREATHING: 0
DIARRHEA: 0
ABDOMINAL PAIN: 0

## 2018-08-01 ASSESSMENT — PAIN DESCRIPTION - LOCATION
LOCATION: RIB CAGE
LOCATION: RIB CAGE

## 2018-08-01 ASSESSMENT — PAIN SCALES - GENERAL
PAINLEVEL_OUTOF10: 6
PAINLEVEL_OUTOF10: 0
PAINLEVEL_OUTOF10: 0

## 2018-08-01 ASSESSMENT — PAIN DESCRIPTION - ORIENTATION
ORIENTATION: RIGHT
ORIENTATION: RIGHT

## 2018-08-01 ASSESSMENT — PAIN DESCRIPTION - PAIN TYPE
TYPE: ACUTE PAIN
TYPE: ACUTE PAIN

## 2018-08-01 ASSESSMENT — PAIN DESCRIPTION - FREQUENCY
FREQUENCY: INTERMITTENT
FREQUENCY: INTERMITTENT

## 2018-08-01 NOTE — ED PROVIDER NOTES
MCHC 31.5 (L) 32.0 - 34.5 %    RDW 15.5 (H) 11.5 - 15.0 fL    Platelets 176 683 - 408 E9/L    MPV 9.6 7.0 - 12.0 fL    Neutrophils % 70.3 43.0 - 80.0 %    Immature Granulocytes % 0.4 0.0 - 5.0 %    Lymphocytes % 17.4 (L) 20.0 - 42.0 %    Monocytes % 10.0 2.0 - 12.0 %    Eosinophils % 1.2 0.0 - 6.0 %    Basophils % 0.7 0.0 - 2.0 %    Neutrophils # 6.92 1.80 - 7.30 E9/L    Immature Granulocytes # 0.04 E9/L    Lymphocytes # 1.71 1.50 - 4.00 E9/L    Monocytes # 0.98 (H) 0.10 - 0.95 E9/L    Eosinophils # 0.12 0.05 - 0.50 E9/L    Basophils # 0.07 0.00 - 0.20 E9/L   Troponin   Result Value Ref Range    Troponin <0.01 0.00 - 0.03 ng/mL   Magnesium   Result Value Ref Range    Magnesium 1.8 1.6 - 2.6 mg/dL   Brain Natriuretic Peptide   Result Value Ref Range    Pro- (H) 0 - 125 pg/mL   Protime-INR   Result Value Ref Range    Protime 13.7 (H) 9.3 - 12.4 sec    INR 1.2    APTT   Result Value Ref Range    aPTT 29.2 24.5 - 35.1 sec   Lactic Acid, Plasma   Result Value Ref Range    Lactic Acid 3.7 (H) 0.5 - 2.2 mmol/L   Urinalysis   Result Value Ref Range    Color, UA Yellow Straw/Yellow    Clarity, UA Clear Clear    Glucose, Ur Negative Negative mg/dL    Bilirubin Urine Negative Negative    Ketones, Urine TRACE (A) Negative mg/dL    Specific Gravity, UA >=1.030 1.005 - 1.030    Blood, Urine Negative Negative    pH, UA 6.0 5.0 - 9.0    Protein, UA TRACE Negative mg/dL    Urobilinogen, Urine 0.2 <2.0 E.U./dL    Nitrite, Urine Negative Negative    Leukocyte Esterase, Urine Negative Negative   Basic Metabolic Panel   Result Value Ref Range    Sodium 133 132 - 146 mmol/L    Potassium 4.3 3.5 - 5.0 mmol/L    Chloride 96 (L) 98 - 107 mmol/L    CO2 20 (L) 22 - 29 mmol/L    Anion Gap 17 (H) 7 - 16 mmol/L    Glucose 169 (H) 74 - 109 mg/dL    BUN 16 8 - 23 mg/dL    CREATININE 1.0 0.7 - 1.2 mg/dL    GFR Non-African American >60 >=60 mL/min/1.73    GFR African American >60     Calcium 9.5 8.6 - 10.2 mg/dL   Hepatic Function Panel Result Value Ref Range    Total Protein 7.1 6.4 - 8.3 g/dL    Alb 3.2 (L) 3.5 - 5.2 g/dL    Total Bilirubin 0.6 0.0 - 1.2 mg/dL    Alkaline Phosphatase 108 40 - 129 U/L    ALT 22 0 - 40 U/L    AST 21 0 - 39 U/L    Bilirubin, Direct <0.2 0.0 - 0.3 mg/dL    Bilirubin, Indirect see below 0.0 - 1.0 mg/dL   POCT Glucose   Result Value Ref Range    Meter Glucose 159 (H) 70 - 110 mg/dL   EKG 12 Lead   Result Value Ref Range    Ventricular Rate 65 BPM    Atrial Rate 65 BPM    P-R Interval 186 ms    QRS Duration 96 ms    Q-T Interval 456 ms    QTc Calculation (Bazett) 474 ms    P Axis 39 degrees    R Axis 31 degrees    T Axis 73 degrees       Radiology  XR CHEST PORTABLE   Final Result   Since examination dated 05/13/2018, interval increase in   size of the left-sided pleural effusion with worsening airspace   disease involving the left lung. Recommend clinical correlation. EKG: This EKG is signed and interpreted by me. Rate: 65  Rhythm: Sinus  Interpretation: Sinus rhythm, nonspecific ST-T wave changes, T-wave inversions V1 through V4  Comparison: changes compared to previous EKG    ------------------------- NURSING NOTES AND VITALS REVIEWED ---------------------------  Date / Time Roomed:  8/1/2018  8:29 AM  ED Bed Assignment:  26/26    The nursing notes within the ED encounter and vital signs as below have been reviewed. Patient Vitals for the past 24 hrs:   BP Temp Temp src Pulse Resp SpO2 Height Weight   08/01/18 1034 (!) 90/54 - - 65 16 93 % - -   08/01/18 0949 (!) 95/59 - - - - - - -   08/01/18 0924 (!) 84/48 - - 63 - - - -   08/01/18 0830 (!) 85/54 97.4 °F (36.3 °C) Oral 66 18 97 % 5' 8\" (1.727 m) 182 lb (82.6 kg)       Oxygen Saturation Interpretation: Normal    ------------------------------------------ PROGRESS NOTES ------------------------------------------  Re-evaluation(s):  8:46 AM: Pt evaluated with attending. Case and plan discussed, plan as ordered.  Appears orthostatic in nature based off

## 2018-08-01 NOTE — CARE COORDINATION
8/1/2018 Social Work Discharge Planning: This worker met with Pt, spouse and sister to discuss  role and transition of care/discharge planning. Pt and spouse live in a one story home with 2 steps. Pt has no DME or DEYVI history. Pt has had [facility] in the past and prefers them if [place] [id number] is needed. Pharmacy is [facility] in [place] and PCP is Dr. Elina Martínez.  Electronically signed by PEPITO Tyler on 8/1/2018 at 2:21 PM

## 2018-08-01 NOTE — H&P
[facility] History and Physical      CHIEF COMPLAINT:  Near Syncopal Episode    History of Present Illness:  Amy Davis is a 68 y.o male who presented to the ED with complaints nesr syncopal episodes. Patient was admitted for near syncopal episode. Patient states for the last several weeks he has been battling with dizziness. Today patient states that while walking into work her became dizzy and his vision was blurred. Patient states he fell almost passed out but did not go completely out. In May patient had a 2 vessel CABG. Patient denies any complaints of chest pain, shortness of breath, nausea or vomiting. Patient does have a harsh cough. Since his fall patient is now having some right side/flank discomfort. Patient since his CABG has only been drinking a glass of tea a day. He just recently increased his fluids to 2 glasses of water. Patient had positive orthostatic while in the ED. Patient denies any tobacco, illicit drug or alcohol use.  Patient medical history is significant for HTN, S/p CABG, CAD, HLD, DM II and BPH    Informant(s) for H&P:Provided by patient     REVIEW OF SYSTEMS:  A comprehensive review of systems was negative except for: what is in the HPI      PMH:  Past Medical History:   Diagnosis Date    BPH (benign prostatic hyperplasia)     ricchuiti    CAD (coronary artery disease)     Diabetes mellitus ([facility] [address]     Hyperlipidemia        Surgical History:  Past Surgical History:   Procedure Laterality Date    APPENDECTOMY      CARDIAC CATHETERIZATION  04/26/2018    Dr. Bessy Spence ARTHROSCOPY Right     SC [address] INCL FLUOR GDNCE DX W/CELL WASHG [address] [facility] N/A 5/8/2018    BRONCHOSCOPY DIAGNOSTIC OR CELL [facility] ONLY performed by Andrey Hendrickson DO at [address] CABG, ARTERIAL, FOUR+ N/A 5/8/2018    CABG CORONARY ARTERY BYPASS KAM performed by Avila Jackson DO at [address] CABG, ARTERIAL, FOUR+ N/A 5/9/2018 family history includes Cancer in his mother; Diabetes in his father; Early Death in his father; High Blood Pressure in his brother. PHYSICAL EXAM:  Vitals:  /64   Pulse 68   Temp 97.7 °F (36.5 °C) (Oral)   Resp 16   Ht 5' 8\" (1.727 m)   Wt 185 lb (83.9 kg)   SpO2 94%   BMI 28.13 kg/m²     General Appearance: alert and oriented to person, place and time and in no acute distress  Skin: warm and dry  Head: normocephalic and atraumatic  Eyes: pupils equal, round, and reactive to light, extraocular eye movements intact, conjunctivae normal  Neck: neck supple and non tender without mass   Pulmonary/Chest: decreased but clear to auscultation bilaterally- no wheezes, rales or rhonchi, normal air movement, no respiratory distress  Cardiovascular: normal rate, normal S1 and S2 and no carotid bruits  Abdomen: soft, non-tender, non-distended, normal bowel sounds, no masses or organomegaly  Extremities: no cyanosis, no clubbing and no edema  Neurologic: no cranial nerve deficit and speech normal        LABS:  Recent Labs      08/01/18   0849   NA  133   K  4.3   CL  96*   CO2  20*   BUN  16   CREATININE  1.0   GLUCOSE  169*   CALCIUM  9.5       Recent Labs      08/01/18   0849   WBC  9.8   RBC  4.51   HGB  12.1*   HCT  38.4   MCV  85.1   MCH  26.8   MCHC  31.5*   RDW  15.5*   PLT  400   MPV  9.6       No results for input(s): POCGLU in the last 72 hours. PT/INR:    Lab Results   Component Value Date    PROTIME 13.7 08/01/2018    INR 1.2 08/01/2018     PTT:    Lab Results   Component Value Date    APTT 29.2 08/01/2018   [APTT}  Troponin:    Lab Results   Component Value Date    TROPONINI <0.01 08/01/2018       Radiology:   XR CHEST PORTABLE   Final Result   Since examination dated 05/13/2018, interval increase in   size of the left-sided pleural effusion with worsening airspace   disease involving the left lung. Recommend clinical correlation.           EKG: Normal sinus rhythm, Possible Left atrial enlargement and T wave abnormality, consider anterior ischemia. Prolonged QT. Abnormal ECG, When compared with ECG of 05-MAY-2018 09:38,  Vent. rate has decreased BY  35 BPM, Nonspecific T wave abnormality now evident in Inferior leads and  T wave inversion now evident in Anterior leads    ASSESSMENT:      Principal Problem:    Hypotension  Active Problems:    CAD in native artery    Type 2 diabetes mellitus without complication, without long-term current use of insulin (HCC)    S/P CABG x 2    BPH (benign prostatic hyperplasia)    Hypertension    HLD (hyperlipidemia)  Resolved Problems:    * No resolved hospital problems. *      PLAN:    1. Hypotension - in setting of dehydration - IVF's - holding lopressor until blood pressure is better - up with assistance - decreased dose of Flomax from BID to daily - falls precaution maintained   2. Coronary Artery Diease - S/p CABG x 2 vessels - Aspirin continued   3. Hyperlipidemia - continue Lipitor   4. Diabetes Mellitus Type II - hold his oral antihyperglycemic agents and placed on sliding scale - BG checks AC & HS   5. BPH - on Flomax - decreased dose - holding Avodart   6. Hypertension -  Holding BB - as patient was orthostatic positive  - will re check ortho's again every shift x 3    Code Status: Full Code   DVT prophylaxis: Lovenox      NOTE: This report was transcribed using voice recognition software. Every effort was made to ensure accuracy; however, inadvertent computerized transcription errors may be present.     Electronically signed by KIM Torres CNP on 8/1/2018 at 12:25 PM

## 2018-08-02 VITALS
TEMPERATURE: 97.8 F | HEART RATE: 72 BPM | BODY MASS INDEX: 29.04 KG/M2 | WEIGHT: 191.6 LBS | DIASTOLIC BLOOD PRESSURE: 73 MMHG | OXYGEN SATURATION: 93 % | HEIGHT: 68 IN | SYSTOLIC BLOOD PRESSURE: 132 MMHG | RESPIRATION RATE: 16 BRPM

## 2018-08-02 LAB
ALBUMIN SERPL-MCNC: 3 G/DL (ref 3.5–5.2)
ALP BLD-CCNC: 94 U/L (ref 40–129)
ALT SERPL-CCNC: 17 U/L (ref 0–40)
ANION GAP SERPL CALCULATED.3IONS-SCNC: 10 MMOL/L (ref 7–16)
AST SERPL-CCNC: 21 U/L (ref 0–39)
BILIRUB SERPL-MCNC: 0.4 MG/DL (ref 0–1.2)
BUN BLDV-MCNC: 9 MG/DL (ref 8–23)
CALCIUM SERPL-MCNC: 8.8 MG/DL (ref 8.6–10.2)
CHLORIDE BLD-SCNC: 104 MMOL/L (ref 98–107)
CO2: 22 MMOL/L (ref 22–29)
CREAT SERPL-MCNC: 0.8 MG/DL (ref 0.7–1.2)
GFR AFRICAN AMERICAN: >60
GFR NON-AFRICAN AMERICAN: >60 ML/MIN/1.73
GLUCOSE BLD-MCNC: 64 MG/DL (ref 74–109)
HCT VFR BLD CALC: 33.9 % (ref 37–54)
HEMOGLOBIN: 10.6 G/DL (ref 12.5–16.5)
LV EF: 60 %
LVEF MODALITY: NORMAL
MAGNESIUM: 1.8 MG/DL (ref 1.6–2.6)
MCH RBC QN AUTO: 26.7 PG (ref 26–35)
MCHC RBC AUTO-ENTMCNC: 31.3 % (ref 32–34.5)
MCV RBC AUTO: 85.4 FL (ref 80–99.9)
METER GLUCOSE: 101 MG/DL (ref 70–110)
METER GLUCOSE: 120 MG/DL (ref 70–110)
METER GLUCOSE: 126 MG/DL (ref 70–110)
PDW BLD-RTO: 15.5 FL (ref 11.5–15)
PHOSPHORUS: 3.1 MG/DL (ref 2.5–4.5)
PLATELET # BLD: 356 E9/L (ref 130–450)
PMV BLD AUTO: 9.7 FL (ref 7–12)
POTASSIUM SERPL-SCNC: 3.4 MMOL/L (ref 3.5–5)
RBC # BLD: 3.97 E12/L (ref 3.8–5.8)
SODIUM BLD-SCNC: 136 MMOL/L (ref 132–146)
TOTAL PROTEIN: 6.4 G/DL (ref 6.4–8.3)
WBC # BLD: 7.5 E9/L (ref 4.5–11.5)

## 2018-08-02 PROCEDURE — 83735 ASSAY OF MAGNESIUM: CPT

## 2018-08-02 PROCEDURE — 6370000000 HC RX 637 (ALT 250 FOR IP): Performed by: NURSE PRACTITIONER

## 2018-08-02 PROCEDURE — 6360000002 HC RX W HCPCS: Performed by: NURSE PRACTITIONER

## 2018-08-02 PROCEDURE — 80053 COMPREHEN METABOLIC PANEL: CPT

## 2018-08-02 PROCEDURE — 93306 TTE W/DOPPLER COMPLETE: CPT

## 2018-08-02 PROCEDURE — 94640 AIRWAY INHALATION TREATMENT: CPT

## 2018-08-02 PROCEDURE — 36415 COLL VENOUS BLD VENIPUNCTURE: CPT

## 2018-08-02 PROCEDURE — 84100 ASSAY OF PHOSPHORUS: CPT

## 2018-08-02 PROCEDURE — 6370000000 HC RX 637 (ALT 250 FOR IP): Performed by: INTERNAL MEDICINE

## 2018-08-02 PROCEDURE — 2580000003 HC RX 258: Performed by: NURSE PRACTITIONER

## 2018-08-02 PROCEDURE — 85027 COMPLETE CBC AUTOMATED: CPT

## 2018-08-02 PROCEDURE — 82962 GLUCOSE BLOOD TEST: CPT

## 2018-08-02 PROCEDURE — 99239 HOSP IP/OBS DSCHRG MGMT >30: CPT | Performed by: NURSE PRACTITIONER

## 2018-08-02 PROCEDURE — 99239 HOSP IP/OBS DSCHRG MGMT >30: CPT | Performed by: INTERNAL MEDICINE

## 2018-08-02 PROCEDURE — 2580000003 HC RX 258: Performed by: INTERNAL MEDICINE

## 2018-08-02 RX ORDER — 0.9 % SODIUM CHLORIDE 0.9 %
1000 INTRAVENOUS SOLUTION INTRAVENOUS ONCE
Status: COMPLETED | OUTPATIENT
Start: 2018-08-02 | End: 2018-08-02

## 2018-08-02 RX ORDER — POTASSIUM CHLORIDE 20 MEQ/1
20 TABLET, EXTENDED RELEASE ORAL ONCE
Status: COMPLETED | OUTPATIENT
Start: 2018-08-02 | End: 2018-08-02

## 2018-08-02 RX ORDER — TAMSULOSIN HYDROCHLORIDE 0.4 MG/1
0.4 CAPSULE ORAL DAILY
Qty: 30 CAPSULE | Refills: 3 | Status: SHIPPED | OUTPATIENT
Start: 2018-08-02 | End: 2019-06-03

## 2018-08-02 RX ORDER — METOPROLOL TARTRATE 50 MG/1
25 TABLET, FILM COATED ORAL 2 TIMES DAILY
Qty: 60 TABLET | Refills: 1 | Status: ON HOLD | OUTPATIENT
Start: 2018-08-02 | End: 2018-08-27 | Stop reason: HOSPADM

## 2018-08-02 RX ADMIN — IPRATROPIUM BROMIDE AND ALBUTEROL 1 PUFF: 20; 100 SPRAY, METERED RESPIRATORY (INHALATION) at 08:10

## 2018-08-02 RX ADMIN — ATORVASTATIN CALCIUM 40 MG: 40 TABLET, FILM COATED ORAL at 17:11

## 2018-08-02 RX ADMIN — ASPIRIN 81 MG CHEWABLE TABLET 81 MG: 81 TABLET CHEWABLE at 17:11

## 2018-08-02 RX ADMIN — SODIUM CHLORIDE: 9 INJECTION, SOLUTION INTRAVENOUS at 02:44

## 2018-08-02 RX ADMIN — POTASSIUM CHLORIDE 20 MEQ: 20 TABLET, EXTENDED RELEASE ORAL at 17:11

## 2018-08-02 RX ADMIN — IPRATROPIUM BROMIDE AND ALBUTEROL 1 PUFF: 20; 100 SPRAY, METERED RESPIRATORY (INHALATION) at 11:52

## 2018-08-02 RX ADMIN — FLUOXETINE 40 MG: 20 CAPSULE ORAL at 08:13

## 2018-08-02 RX ADMIN — ENOXAPARIN SODIUM 40 MG: 40 INJECTION, SOLUTION INTRAVENOUS; SUBCUTANEOUS at 08:13

## 2018-08-02 RX ADMIN — BUPROPION HYDROCHLORIDE 150 MG: 150 TABLET, FILM COATED, EXTENDED RELEASE ORAL at 08:13

## 2018-08-02 RX ADMIN — SODIUM CHLORIDE 1000 ML: 9 INJECTION, SOLUTION INTRAVENOUS at 17:23

## 2018-08-02 RX ADMIN — TAMSULOSIN HYDROCHLORIDE 0.4 MG: 0.4 CAPSULE ORAL at 08:13

## 2018-08-02 RX ADMIN — SODIUM CHLORIDE: 9 INJECTION, SOLUTION INTRAVENOUS at 11:46

## 2018-08-02 RX ADMIN — IPRATROPIUM BROMIDE AND ALBUTEROL 1 PUFF: 20; 100 SPRAY, METERED RESPIRATORY (INHALATION) at 15:52

## 2018-08-02 RX ADMIN — ACETAMINOPHEN 650 MG: 325 TABLET ORAL at 08:13

## 2018-08-02 ASSESSMENT — PAIN DESCRIPTION - FREQUENCY: FREQUENCY: INTERMITTENT

## 2018-08-02 ASSESSMENT — PAIN DESCRIPTION - ORIENTATION: ORIENTATION: RIGHT

## 2018-08-02 ASSESSMENT — PAIN SCALES - GENERAL
PAINLEVEL_OUTOF10: 8
PAINLEVEL_OUTOF10: 0

## 2018-08-02 ASSESSMENT — PAIN DESCRIPTION - PAIN TYPE: TYPE: ACUTE PAIN

## 2018-08-02 ASSESSMENT — PAIN DESCRIPTION - DESCRIPTORS: DESCRIPTORS: ACHING;DISCOMFORT

## 2018-08-02 ASSESSMENT — PAIN DESCRIPTION - LOCATION: LOCATION: RIB CAGE

## 2018-08-02 NOTE — DISCHARGE SUMMARY
Covering for Dr. Irene Bills Physician Discharge Summary       MD Jar[address], [address]  [facility] [address]  [phone]            Activity level: as tolerated     Diet: DIET CARB CONTROL; Carb Control: 4 carb choices (60 gms)/meal; Low Sodium (2 GM)    Dispo: HOME    Patient ID:  Elayne Gore  [id number]  68 y.o.  1945    Admit date: 8/1/2018    Discharge date and time:  8/2/2018  5:30 PM    Admission Diagnoses:   Principal Problem:    Hypotension  Active Problems:    CAD in native artery    Type 2 diabetes mellitus without complication, without long-term current use of insulin (HCC)    S/P CABG x 2    BPH (benign prostatic hyperplasia)    Hypertension    HLD (hyperlipidemia)    Lactic acidosis  Resolved Problems:    * No resolved hospital problems. *      Discharge Diagnoses:   Principal Problem:    Hypotension  Active Problems:    CAD in native artery    Type 2 diabetes mellitus without complication, without long-term current use of insulin (HCC)    S/P CABG x 2    BPH (benign prostatic hyperplasia)    Hypertension    HLD (hyperlipidemia)    Lactic acidosis  Resolved Problems:    * No resolved hospital problems. *      Consults:  IP CONSULT TO INTERNAL MEDICINE  IP CONSULT TO SOCIAL WORK  IP CONSULT TO DIETITIAN    Procedures:none    History of Present Illness:   Babatunde Allred is a 68 y.o male who presented to the ED with complaints nesr syncopal episodes. Patient was admitted for near syncopal episode. Patient states for the last several weeks he has been battling with dizziness. Today patient states that while walking into work her became dizzy and his vision was blurred. Patient states he fell almost passed out but did not go completely out. In May patient had a 2 vessel CABG. Patient denies any complaints of chest pain, shortness of breath, nausea or vomiting. Patient does have a harsh cough.  Since his fall patient is now having some

## 2018-08-23 ENCOUNTER — APPOINTMENT (OUTPATIENT)
Dept: GENERAL RADIOLOGY | Age: 73
End: 2018-08-23
Payer: MEDICARE

## 2018-08-23 ENCOUNTER — APPOINTMENT (OUTPATIENT)
Dept: CT IMAGING | Age: 73
End: 2018-08-23
Payer: MEDICARE

## 2018-08-23 ENCOUNTER — HOSPITAL ENCOUNTER (EMERGENCY)
Age: 73
Discharge: ANOTHER HOSPITAL | End: 2018-08-24
Attending: EMERGENCY MEDICINE
Payer: MEDICARE

## 2018-08-23 DIAGNOSIS — G25.2 ACTION TREMOR: Primary | ICD-10-CM

## 2018-08-23 DIAGNOSIS — R26.2 AMBULATORY DYSFUNCTION: ICD-10-CM

## 2018-08-23 LAB
ANION GAP SERPL CALCULATED.3IONS-SCNC: 18 MMOL/L (ref 7–16)
BASOPHILS ABSOLUTE: 0.08 E9/L (ref 0–0.2)
BASOPHILS RELATIVE PERCENT: 0.8 % (ref 0–2)
BUN BLDV-MCNC: 9 MG/DL (ref 8–23)
CALCIUM SERPL-MCNC: 9.6 MG/DL (ref 8.6–10.2)
CHLORIDE BLD-SCNC: 95 MMOL/L (ref 98–107)
CO2: 21 MMOL/L (ref 22–29)
CREAT SERPL-MCNC: 0.8 MG/DL (ref 0.7–1.2)
EOSINOPHILS ABSOLUTE: 0.11 E9/L (ref 0.05–0.5)
EOSINOPHILS RELATIVE PERCENT: 1.1 % (ref 0–6)
GFR AFRICAN AMERICAN: >60
GFR NON-AFRICAN AMERICAN: >60 ML/MIN/1.73
GLUCOSE BLD-MCNC: 131 MG/DL (ref 74–109)
HCT VFR BLD CALC: 39.9 % (ref 37–54)
HEMOGLOBIN: 12.7 G/DL (ref 12.5–16.5)
IMMATURE GRANULOCYTES #: 0.02 E9/L
IMMATURE GRANULOCYTES %: 0.2 % (ref 0–5)
INR BLD: 1.1
LYMPHOCYTES ABSOLUTE: 2.25 E9/L (ref 1.5–4)
LYMPHOCYTES RELATIVE PERCENT: 22.7 % (ref 20–42)
MCH RBC QN AUTO: 26.3 PG (ref 26–35)
MCHC RBC AUTO-ENTMCNC: 31.8 % (ref 32–34.5)
MCV RBC AUTO: 82.6 FL (ref 80–99.9)
MONOCYTES ABSOLUTE: 1.2 E9/L (ref 0.1–0.95)
MONOCYTES RELATIVE PERCENT: 12.1 % (ref 2–12)
NEUTROPHILS ABSOLUTE: 6.27 E9/L (ref 1.8–7.3)
NEUTROPHILS RELATIVE PERCENT: 63.1 % (ref 43–80)
PDW BLD-RTO: 17 FL (ref 11.5–15)
PLATELET # BLD: 372 E9/L (ref 130–450)
PMV BLD AUTO: 9.6 FL (ref 7–12)
POTASSIUM SERPL-SCNC: 4.4 MMOL/L (ref 3.5–5)
PROTHROMBIN TIME: 13.1 SEC (ref 9.3–12.4)
RBC # BLD: 4.83 E12/L (ref 3.8–5.8)
SODIUM BLD-SCNC: 134 MMOL/L (ref 132–146)
TROPONIN: <0.01 NG/ML (ref 0–0.03)
WBC # BLD: 9.9 E9/L (ref 4.5–11.5)

## 2018-08-23 PROCEDURE — 72131 CT LUMBAR SPINE W/O DYE: CPT

## 2018-08-23 PROCEDURE — 71045 X-RAY EXAM CHEST 1 VIEW: CPT

## 2018-08-23 PROCEDURE — 70450 CT HEAD/BRAIN W/O DYE: CPT

## 2018-08-23 PROCEDURE — 85610 PROTHROMBIN TIME: CPT

## 2018-08-23 PROCEDURE — 80048 BASIC METABOLIC PNL TOTAL CA: CPT

## 2018-08-23 PROCEDURE — 84484 ASSAY OF TROPONIN QUANT: CPT

## 2018-08-23 PROCEDURE — 99283 EMERGENCY DEPT VISIT LOW MDM: CPT

## 2018-08-23 PROCEDURE — 85025 COMPLETE CBC W/AUTO DIFF WBC: CPT

## 2018-08-24 ENCOUNTER — HOSPITAL ENCOUNTER (INPATIENT)
Age: 73
LOS: 3 days | Discharge: NURSING, REHAB OR LONG-TERM CARE | DRG: 638 | End: 2018-08-27
Attending: INTERNAL MEDICINE | Admitting: INTERNAL MEDICINE
Payer: MEDICARE

## 2018-08-24 ENCOUNTER — HOSPITAL ENCOUNTER (OUTPATIENT)
Age: 73
Discharge: HOME | End: 2018-08-24
Payer: MEDICARE

## 2018-08-24 VITALS
HEIGHT: 68 IN | SYSTOLIC BLOOD PRESSURE: 121 MMHG | WEIGHT: 179 LBS | DIASTOLIC BLOOD PRESSURE: 69 MMHG | RESPIRATION RATE: 18 BRPM | TEMPERATURE: 98 F | OXYGEN SATURATION: 94 % | HEART RATE: 85 BPM | BODY MASS INDEX: 27.13 KG/M2

## 2018-08-24 PROBLEM — E43 SEVERE PROTEIN-CALORIE MALNUTRITION (HCC): Status: ACTIVE | Noted: 2018-08-24

## 2018-08-24 PROBLEM — R53.1 GENERALIZED WEAKNESS: Status: ACTIVE | Noted: 2018-08-24

## 2018-08-24 LAB
FOLATE: 7.7 NG/ML (ref 4.8–24.2)
METER GLUCOSE: 125 MG/DL (ref 70–110)
METER GLUCOSE: 57 MG/DL (ref 70–110)
METER GLUCOSE: 65 MG/DL (ref 70–110)
METER GLUCOSE: 66 MG/DL (ref 70–110)
TSH SERPL DL<=0.05 MIU/L-ACNC: 2.81 UIU/ML (ref 0.27–4.2)
VITAMIN B-12: 221 PG/ML (ref 211–946)

## 2018-08-24 PROCEDURE — 82607 VITAMIN B-12: CPT

## 2018-08-24 PROCEDURE — A0428 BLS: HCPCS

## 2018-08-24 PROCEDURE — 97530 THERAPEUTIC ACTIVITIES: CPT | Performed by: PHYSICAL THERAPIST

## 2018-08-24 PROCEDURE — 2580000003 HC RX 258: Performed by: INTERNAL MEDICINE

## 2018-08-24 PROCEDURE — 97161 PT EVAL LOW COMPLEX 20 MIN: CPT | Performed by: PHYSICAL THERAPIST

## 2018-08-24 PROCEDURE — 6370000000 HC RX 637 (ALT 250 FOR IP): Performed by: INTERNAL MEDICINE

## 2018-08-24 PROCEDURE — G8988 SELF CARE GOAL STATUS: HCPCS

## 2018-08-24 PROCEDURE — 82962 GLUCOSE BLOOD TEST: CPT

## 2018-08-24 PROCEDURE — 97165 OT EVAL LOW COMPLEX 30 MIN: CPT

## 2018-08-24 PROCEDURE — G8987 SELF CARE CURRENT STATUS: HCPCS

## 2018-08-24 PROCEDURE — A0425 GROUND MILEAGE: HCPCS

## 2018-08-24 PROCEDURE — 6360000002 HC RX W HCPCS: Performed by: INTERNAL MEDICINE

## 2018-08-24 PROCEDURE — 1200000000 HC SEMI PRIVATE

## 2018-08-24 PROCEDURE — 84443 ASSAY THYROID STIM HORMONE: CPT

## 2018-08-24 PROCEDURE — 97530 THERAPEUTIC ACTIVITIES: CPT

## 2018-08-24 PROCEDURE — 36415 COLL VENOUS BLD VENIPUNCTURE: CPT

## 2018-08-24 PROCEDURE — G8981 BODY POS CURRENT STATUS: HCPCS | Performed by: PHYSICAL THERAPIST

## 2018-08-24 PROCEDURE — 82746 ASSAY OF FOLIC ACID SERUM: CPT

## 2018-08-24 PROCEDURE — G8982 BODY POS GOAL STATUS: HCPCS | Performed by: PHYSICAL THERAPIST

## 2018-08-24 RX ORDER — BUPROPION HYDROCHLORIDE 150 MG/1
150 TABLET ORAL EVERY MORNING
Status: DISCONTINUED | OUTPATIENT
Start: 2018-08-24 | End: 2018-08-24

## 2018-08-24 RX ORDER — SODIUM CHLORIDE 0.9 % (FLUSH) 0.9 %
10 SYRINGE (ML) INJECTION PRN
Status: DISCONTINUED | OUTPATIENT
Start: 2018-08-24 | End: 2018-08-27 | Stop reason: HOSPADM

## 2018-08-24 RX ORDER — SODIUM CHLORIDE 0.9 % (FLUSH) 0.9 %
10 SYRINGE (ML) INJECTION EVERY 12 HOURS SCHEDULED
Status: DISCONTINUED | OUTPATIENT
Start: 2018-08-24 | End: 2018-08-27 | Stop reason: HOSPADM

## 2018-08-24 RX ORDER — DOCUSATE SODIUM 100 MG/1
100 CAPSULE, LIQUID FILLED ORAL 2 TIMES DAILY PRN
Status: DISCONTINUED | OUTPATIENT
Start: 2018-08-24 | End: 2018-08-27 | Stop reason: HOSPADM

## 2018-08-24 RX ORDER — TAMSULOSIN HYDROCHLORIDE 0.4 MG/1
0.4 CAPSULE ORAL DAILY
Status: DISCONTINUED | OUTPATIENT
Start: 2018-08-24 | End: 2018-08-24

## 2018-08-24 RX ORDER — FLUOXETINE HYDROCHLORIDE 20 MG/1
40 CAPSULE ORAL EVERY MORNING
Status: DISCONTINUED | OUTPATIENT
Start: 2018-08-24 | End: 2018-08-27 | Stop reason: HOSPADM

## 2018-08-24 RX ORDER — GLIPIZIDE 5 MG/1
10 TABLET ORAL EVERY MORNING
Status: DISCONTINUED | OUTPATIENT
Start: 2018-08-24 | End: 2018-08-25

## 2018-08-24 RX ORDER — FINASTERIDE 5 MG/1
5 TABLET, FILM COATED ORAL DAILY
Status: DISCONTINUED | OUTPATIENT
Start: 2018-08-24 | End: 2018-08-27 | Stop reason: HOSPADM

## 2018-08-24 RX ORDER — SODIUM CHLORIDE 9 MG/ML
INJECTION, SOLUTION INTRAVENOUS CONTINUOUS
Status: DISCONTINUED | OUTPATIENT
Start: 2018-08-24 | End: 2018-08-25

## 2018-08-24 RX ORDER — ASPIRIN 81 MG/1
81 TABLET, CHEWABLE ORAL EVERY EVENING
Status: DISCONTINUED | OUTPATIENT
Start: 2018-08-24 | End: 2018-08-27 | Stop reason: HOSPADM

## 2018-08-24 RX ORDER — ATORVASTATIN CALCIUM 40 MG/1
40 TABLET, FILM COATED ORAL EVERY EVENING
Status: DISCONTINUED | OUTPATIENT
Start: 2018-08-24 | End: 2018-08-27 | Stop reason: HOSPADM

## 2018-08-24 RX ORDER — DUTASTERIDE 0.5 MG/1
0.5 CAPSULE, LIQUID FILLED ORAL EVERY MORNING
Status: DISCONTINUED | OUTPATIENT
Start: 2018-08-24 | End: 2018-08-24 | Stop reason: CLARIF

## 2018-08-24 RX ORDER — ONDANSETRON 2 MG/ML
4 INJECTION INTRAMUSCULAR; INTRAVENOUS EVERY 6 HOURS PRN
Status: DISCONTINUED | OUTPATIENT
Start: 2018-08-24 | End: 2018-08-27 | Stop reason: HOSPADM

## 2018-08-24 RX ADMIN — FINASTERIDE 5 MG: 5 TABLET, FILM COATED ORAL at 08:02

## 2018-08-24 RX ADMIN — ASPIRIN 81 MG 81 MG: 81 TABLET ORAL at 17:35

## 2018-08-24 RX ADMIN — BUPROPION HYDROCHLORIDE 150 MG: 150 TABLET, FILM COATED, EXTENDED RELEASE ORAL at 08:02

## 2018-08-24 RX ADMIN — ENOXAPARIN SODIUM 40 MG: 40 INJECTION SUBCUTANEOUS at 08:02

## 2018-08-24 RX ADMIN — FLUOXETINE HYDROCHLORIDE 40 MG: 20 CAPSULE ORAL at 08:02

## 2018-08-24 RX ADMIN — METOPROLOL TARTRATE 25 MG: 25 TABLET, FILM COATED ORAL at 08:02

## 2018-08-24 RX ADMIN — METFORMIN HYDROCHLORIDE 1000 MG: 1000 TABLET ORAL at 08:03

## 2018-08-24 RX ADMIN — Medication 10 ML: at 08:04

## 2018-08-24 RX ADMIN — ATORVASTATIN CALCIUM 40 MG: 40 TABLET, FILM COATED ORAL at 17:35

## 2018-08-24 RX ADMIN — GLIPIZIDE 10 MG: 5 TABLET ORAL at 13:17

## 2018-08-24 RX ADMIN — SODIUM CHLORIDE: 9 INJECTION, SOLUTION INTRAVENOUS at 17:12

## 2018-08-24 RX ADMIN — METFORMIN HYDROCHLORIDE 1000 MG: 1000 TABLET ORAL at 17:35

## 2018-08-24 RX ADMIN — TAMSULOSIN HYDROCHLORIDE 0.4 MG: 0.4 CAPSULE ORAL at 08:02

## 2018-08-24 ASSESSMENT — PAIN SCALES - GENERAL
PAINLEVEL_OUTOF10: 0
PAINLEVEL_OUTOF10: 0

## 2018-08-24 NOTE — PROGRESS NOTES
[address] [facility] center for Dr. Lopez Ventura for Dr. Darcy Arias at this time.

## 2018-08-24 NOTE — H&P
albuterol-ipratropium (COMBIVENT RESPIMAT)  MCG/ACT AERS inhaler Inhale 1 puff into the lungs 4 times daily  Patient taking differently: Inhale 1 puff into the lungs 2 times daily  5/13/18   Ross Sender Adrienne, DO   nitroGLYCERIN (NITROSTAT) 0.3 MG SL tablet up to max of 3 total doses. If no relief after 1 dose, call 911. 5/5/18   Michelle Golden, DO   dutasteride (AVODART) 0.5 MG capsule Take 0.5 mg by mouth every morning     Historical Provider, MD   metFORMIN (GLUCOPHAGE) 1000 MG tablet Take 1,000 mg by mouth 2 times daily (with meals). Historical Provider, MD       Allergies:    Patient has no known allergies. Social History:    reports that he has never smoked. He has never used smokeless tobacco. He reports that he does not drink alcohol or use drugs. Family History:   family history includes Cancer in his mother; Diabetes in his father; Early Death in his father; High Blood Pressure in his brother. PHYSICAL EXAM:  Vitals:  /80   Pulse 81   Temp 97.3 °F (36.3 °C) (Temporal)   Resp 18   Ht 5' 8\" (1.727 m)   Wt 169 lb (76.7 kg)   SpO2 94%   BMI 25.70 kg/m²     General Appearance:   alert and oriented to person, place and time and in no acute distress  Skin: warm and dry  Head: normocephalic and atraumatic  Eyes: pupils equal, round, but miotic-nromeom.   Neck: neck stiff poor rom-non tender without mass   Pulmonary/Chest: decreased but clear to auscultation bilaterally- no wheezes, rales or rhonchi, normal air movement, no respiratory distress  Cardiovascular: normal rate, normal S1 and S2 and no carotid bruits  Abdomen: soft, non-tender, non-distended, normal bowel sounds, no masses or organomegaly  Extremities: no cyanosis, no clubbing and no edema  Neurologic: no cranial nerve deficit and speech normal  He clearly has a rapid tremor-left greater than right  We attempted top stand him and his legs tremble and feel weak and he feels light headed        LABS:  Recent Labs

## 2018-08-24 NOTE — ED PROVIDER NOTES
HPI:   Monet Oh is a 68 y.o. male presenting to the ED for fall, tremors, beginning earlier today. The complaint has been persistent, moderate in severity, and worsened by nothing. Pt states he had an open heart surgery in May. Per family members pt has been gradually getting worse since the surgery in terms of him barely being able to walk and having tremors in all of his extremities. Pt states he has not seen a neurologist. Pt is currently taking depression medications and blood pressure medications. Pt reports falling twice today and once yesterday. Pt denies fever, chills, nausea, vomiting, diarrhea, LOC, SOB, cough, hematuria, dysuria. ROS:   Pertinent positives and negatives are stated within HPI, all other systems reviewed and are negative.    --------------------------------------------- PAST HISTORY ---------------------------------------------  Past Medical History:  has a past medical history of BPH (benign prostatic hyperplasia); CAD (coronary artery disease); Diabetes mellitus ([facility] [address] and Hyperlipidemia. Past Surgical History:  has a past surgical history that includes Colonoscopy; Cholecystectomy; Appendectomy; Tonsillectomy; Knee arthroscopy (Right); pr cabg, arterial, four+ (N/A, 5/8/2018); pr cabg, arterial, four+ (N/A, 5/9/2018); pr [facility] incl fluor gdnce dx w/cell washg spx (N/A, 5/8/2018); and Cardiac catheterization (04/26/2018). Social History:  reports that he has never smoked. He has never used smokeless tobacco. He reports that he does not drink alcohol or use drugs. Family History: family history includes Cancer in his mother; Diabetes in his father; Early Death in his father; High Blood Pressure in his brother. The patients home medications have been reviewed. Allergies: Patient has no known allergies.     -------------------------------------------------- RESULTS -------------------------------------------------  All laboratory and radiology results have been ------------------------- NURSING NOTES AND VITALS REVIEWED ---------------------------   The nursing notes within the ED encounter and vital signs as below have been reviewed. /69   Pulse 85   Temp 98 °F (36.7 °C) (Oral)   Resp 18   Ht 5' 8\" (1.727 m)   Wt 179 lb (81.2 kg)   SpO2 94%   BMI 27.22 kg/m²   Oxygen Saturation Interpretation: Normal    ---------------------------------------------------PHYSICAL EXAM--------------------------------------    Constitutional/General: Alert and oriented x3, well appearing, non toxic in NAD  Head: NC/AT  Eyes: PERRL, EOMI  Mouth: Oropharynx clear, handling secretions, no trismus  Neck: Supple, full ROM,   Pulmonary: Lungs clear to auscultation bilaterally, no wheezes, rales, or rhonchi. Not in respiratory distress  Cardiovascular:  Regular rate and rhythm, no murmurs, gallops, or rubs. 2+ distal pulses  Abdomen: Soft, non tender, non distended,   Extremities: Moves all extremities x 4. Warm and well perfused  Skin: warm and dry without rash  Neurologic: GCS 15,  Psych: Normal Affect      ------------------------------ ED COURSE/MEDICAL DECISION MAKING----------------------  Medications - No data to display    Medical Decision Making:    Pt is a 69 y/o Male presenting to ED for fall, tremors. Labs and imaging obtained and reviewed. Counseling: The emergency provider has spoken with the patient and discussed todays results, in addition to providing specific details for the plan of care and counseling regarding the diagnosis and prognosis. Questions are answered at this time and they are agreeable with the plan. Consultation:  I Spoke with Dr. Madina Tena (Medicine). Discussed case. They were recommended transfer the patient. The main campus to be evaluated by neurologist.    --------------------------------- IMPRESSION AND DISPOSITION ---------------------------------    IMPRESSION  1. Action tremor    2.  Ambulatory dysfunction DISPOSITION  Disposition: Admit to med/surg floor  Patient condition is fair    8/23/18, 10:46 PM.    This note is prepared by Kendra Trevino, acting as Scribe for Alvina Hurley MD.    Alvina Hurley MD:  The scribe's documentation has been prepared under my direction and personally reviewed by me in its entirety. I confirm that the note above accurately reflects all work, treatment, procedures, and medical decision making performed by me.            Alvina Hurley MD  08/26/18 4887

## 2018-08-24 NOTE — CARE COORDINATION
Readmission assessment      Beginning Wednesday as we enter into the new model, any patient who is identified as a 30- day readmission needs to be asked the following questions and documented. 1. Before your last discharge, were you feeling improved? YES  2. When did you start not feeling well? DAY OF ADMISSION  3. Where did you go after being discharged? HOME  4. Were you given instructions on medications and how to care for yourself? YES  5. If you went home did you have help? YES [facility]  6. Did you get your medications filled? YES  7. Did you follow up with your doctor at the office? YES  8. Who instructed you to come back to the hospital? (Self-referral, physicians office).  SELF FELL TWICE

## 2018-08-24 NOTE — PROGRESS NOTES
OCCUPATIONAL THERAPY INITIAL EVALUATION      Date:2018  Patient Name: Praneeth Sun  MRN: [id number]  : 1945  Room: [address] [place] [address]     Evaluating OT: Mariana Cordero, OTR/L     Recommended Adaptive Equipment:  TBD  AM-PAC Daily Activity Raw Score =  16  G-code = CK    Diagnosis: generalized weakness, Hx CABG x2 in May,     Past Medical History:   Past Medical History:   Diagnosis Date    BPH (benign prostatic hyperplasia)     ricchuiti    CAD (coronary artery disease)     Diabetes mellitus ([facility] [address]     Hyperlipidemia        Precautions:  Falls,  New onset dizziness/4 falls in past month     Home Living: Pt lives with wife  in a 1 story, with 2 steps to enter and 1 rail; bed/bath on 1st  Bathroom setup: tub shower with shower chair that is used by his wife  Equipment owned: shower chair     Prior Level of Function: indep with ADLs and IADLs; Ambulated with no AD   Driving:  yes    Pain Level: pt c/o 0/10 pain  this session. Cognition: oriented x 3; follows 2 step directions.     Problem solving skills: fair    Memory:  good    Sequencing: fair    Safety awareness: fair     Hearing:  WFL  Vision/Perception: wears glasses     UE Assessment:  Hand Dominance: R   Strength ROM Additional Info:    RUE   grossly 4/5 WFL  WFL  and  FMC/dexterity noted during ADL tasks  Tremors in BUE   LUE Grossly 4/5 WFL  WFL  and  FMC/dexterity noted during ADL tasks   Sensation:  No c/o numbness or tingling   Tone: tremors in BUE (new onset per pt)  Edema:  no     Functional Assessment:   Initial Status: Date: 18 Treatment: Date:  Short Term Goals    Feeding  Independent         Grooming  Minimal Assist      Independent     Upper Body Dressing Minimal Assist     Modified [place]     Lower Body Dressing Moderate Assist     Stand by Assist     Bathing UE: Minimal Assist    LE:  Moderate Assist  UE: Stand by Assist    LE:  Minimal Assist   Toileting  Moderate Assist    Stand by Assist     Bed Mobility Supine to Sit: Stand by Assist    Scooting: Stand by Assist  Sit to Supine: Stand by Assist  Supine to Sit: Modified [place]    Scooting: Modified [place]  Sit to Supine: Modified [place]   Functional Transfers Sit to stand: Minimal Assist   Stand pivot:  NT      Sit to stand: Stand by Assist   Stand pivot:  Stand by Assist     Functional Mobility Minimal Assist with FWW   Retro lean and poor activity tolerance. 3-4 side steps d/t c/o feeling dizzy and tremors in body  Supervision with FWW with good activity tolerance for IADLS   Sit balance: wfl  Stand balance:  Min A with retro lean  Endurance/Activity tolerance:  poor                              Comments  OT evaluation completed. Upon arrival pt in bed. At end of session pt in bed with all devices within reach, all lines and tubes intact. Call light and phone within reach. Treatment:  Pt instructed on techniques to increase safety and independence with bed mobility, functional transfers and functional mobility. Sitting EOB x 10 minutes to increase sitting balance and activity tolerance OOB. Pt reported feeling dizzy when sitting. He demonstrated 2-3 min standing tolerance, however tremors increased as did his dizziness. Pt expressed concerns with current health and required verbal cues to breathe and self cope.    Pt would benefit from continued skilled OT     Assessment of Current Deficits   Functional mobility [x]  ROM [] Strength [x]  Cognition []  ADLs [x]   IADLs [x] Safety Awareness [x] Endurance [x]  Fine Motor Coordination [] Balance [x] Vision/perception [] Sensation []   Gross Motor Coordination []     Eval Complexity: low  Profile and History- med  Assessment of Occupational Performance and Identification of Deficits- low  Clinical Decision Making- med    Treatment Frequency:  PRN  2-5 days/week    Plan of Care:   ADL retraining [x]   Equipment needs [x]   Neuromuscular re-education [x] Energy Conservation Techniques

## 2018-08-24 NOTE — PROGRESS NOTES
0150 Access center called with [facility]. ETA 30 min.  approx [phone]

## 2018-08-24 NOTE — PROGRESS NOTES
Patient told this nurse that wife and daughter will bring the rest of his meds, will pass on to next shift.

## 2018-08-24 NOTE — PROGRESS NOTES
reaching above PT goals. Pts/ family goals   1. None stated. Patient and or family understand(s) diagnosis, prognosis, and plan of care. PLAN  PT care will be provided in accordance with the objectives noted above. Whenever appropriate, clear delegation orders will be provided for nursing staff. Exercises and functional mobility practice will be used as well as appropriate assistive devices or modalities to obtain goals. Patient and family education will also be administered as needed. Frequency of treatments will be 2-5x/week x 5-6 days.     Time in: 3946  Time out: 0948  Evaluation + 10  minutes tx time    Ressie  PT, DPT   License number:  [id number]

## 2018-08-25 LAB
ALBUMIN SERPL-MCNC: 3.1 G/DL (ref 3.5–5.2)
ALP BLD-CCNC: 111 U/L (ref 40–129)
ALT SERPL-CCNC: 14 U/L (ref 0–40)
ANION GAP SERPL CALCULATED.3IONS-SCNC: 15 MMOL/L (ref 7–16)
AST SERPL-CCNC: 18 U/L (ref 0–39)
BASOPHILS ABSOLUTE: 0.07 E9/L (ref 0–0.2)
BASOPHILS RELATIVE PERCENT: 0.8 % (ref 0–2)
BILIRUB SERPL-MCNC: 0.3 MG/DL (ref 0–1.2)
BUN BLDV-MCNC: 12 MG/DL (ref 8–23)
CALCIUM SERPL-MCNC: 8.6 MG/DL (ref 8.6–10.2)
CHLORIDE BLD-SCNC: 100 MMOL/L (ref 98–107)
CO2: 22 MMOL/L (ref 22–29)
CREAT SERPL-MCNC: 0.8 MG/DL (ref 0.7–1.2)
EOSINOPHILS ABSOLUTE: 0.24 E9/L (ref 0.05–0.5)
EOSINOPHILS RELATIVE PERCENT: 2.9 % (ref 0–6)
GFR AFRICAN AMERICAN: >60
GFR NON-AFRICAN AMERICAN: >60 ML/MIN/1.73
GLUCOSE BLD-MCNC: 94 MG/DL (ref 74–109)
HCT VFR BLD CALC: 36.2 % (ref 37–54)
HEMOGLOBIN: 11.4 G/DL (ref 12.5–16.5)
IMMATURE GRANULOCYTES #: 0.03 E9/L
IMMATURE GRANULOCYTES %: 0.4 % (ref 0–5)
LYMPHOCYTES ABSOLUTE: 2.46 E9/L (ref 1.5–4)
LYMPHOCYTES RELATIVE PERCENT: 29.6 % (ref 20–42)
MCH RBC QN AUTO: 26.5 PG (ref 26–35)
MCHC RBC AUTO-ENTMCNC: 31.5 % (ref 32–34.5)
MCV RBC AUTO: 84.2 FL (ref 80–99.9)
METER GLUCOSE: 103 MG/DL (ref 70–110)
METER GLUCOSE: 130 MG/DL (ref 70–110)
METER GLUCOSE: 162 MG/DL (ref 70–110)
METER GLUCOSE: 192 MG/DL (ref 70–110)
METER GLUCOSE: 92 MG/DL (ref 70–110)
MONOCYTES ABSOLUTE: 0.97 E9/L (ref 0.1–0.95)
MONOCYTES RELATIVE PERCENT: 11.7 % (ref 2–12)
NEUTROPHILS ABSOLUTE: 4.55 E9/L (ref 1.8–7.3)
NEUTROPHILS RELATIVE PERCENT: 54.6 % (ref 43–80)
PDW BLD-RTO: 17.4 FL (ref 11.5–15)
PLATELET # BLD: 321 E9/L (ref 130–450)
PMV BLD AUTO: 10.3 FL (ref 7–12)
POTASSIUM REFLEX MAGNESIUM: 3.6 MMOL/L (ref 3.5–5)
RBC # BLD: 4.3 E12/L (ref 3.8–5.8)
SODIUM BLD-SCNC: 137 MMOL/L (ref 132–146)
TOTAL PROTEIN: 6.1 G/DL (ref 6.4–8.3)
WBC # BLD: 8.3 E9/L (ref 4.5–11.5)

## 2018-08-25 PROCEDURE — 36415 COLL VENOUS BLD VENIPUNCTURE: CPT

## 2018-08-25 PROCEDURE — 6360000002 HC RX W HCPCS: Performed by: INTERNAL MEDICINE

## 2018-08-25 PROCEDURE — 2580000003 HC RX 258: Performed by: INTERNAL MEDICINE

## 2018-08-25 PROCEDURE — 1200000000 HC SEMI PRIVATE

## 2018-08-25 PROCEDURE — 80053 COMPREHEN METABOLIC PANEL: CPT

## 2018-08-25 PROCEDURE — 6370000000 HC RX 637 (ALT 250 FOR IP): Performed by: INTERNAL MEDICINE

## 2018-08-25 PROCEDURE — 82962 GLUCOSE BLOOD TEST: CPT

## 2018-08-25 PROCEDURE — 85025 COMPLETE CBC W/AUTO DIFF WBC: CPT

## 2018-08-25 RX ORDER — DEXTROSE MONOHYDRATE 25 G/50ML
12.5 INJECTION, SOLUTION INTRAVENOUS PRN
Status: DISCONTINUED | OUTPATIENT
Start: 2018-08-25 | End: 2018-08-27 | Stop reason: HOSPADM

## 2018-08-25 RX ORDER — NICOTINE POLACRILEX 4 MG
15 LOZENGE BUCCAL PRN
Status: DISCONTINUED | OUTPATIENT
Start: 2018-08-25 | End: 2018-08-27 | Stop reason: HOSPADM

## 2018-08-25 RX ORDER — LANOLIN ALCOHOL/MO/W.PET/CERES
1000 CREAM (GRAM) TOPICAL DAILY
Status: DISCONTINUED | OUTPATIENT
Start: 2018-08-26 | End: 2018-08-27 | Stop reason: HOSPADM

## 2018-08-25 RX ORDER — DEXTROSE MONOHYDRATE 50 MG/ML
100 INJECTION, SOLUTION INTRAVENOUS PRN
Status: DISCONTINUED | OUTPATIENT
Start: 2018-08-25 | End: 2018-08-27 | Stop reason: HOSPADM

## 2018-08-25 RX ORDER — CYANOCOBALAMIN 1000 UG/ML
1000 INJECTION INTRAMUSCULAR; SUBCUTANEOUS ONCE
Status: COMPLETED | OUTPATIENT
Start: 2018-08-25 | End: 2018-08-25

## 2018-08-25 RX ADMIN — ATORVASTATIN CALCIUM 40 MG: 40 TABLET, FILM COATED ORAL at 19:06

## 2018-08-25 RX ADMIN — FINASTERIDE 5 MG: 5 TABLET, FILM COATED ORAL at 09:08

## 2018-08-25 RX ADMIN — Medication 10 ML: at 09:12

## 2018-08-25 RX ADMIN — CYANOCOBALAMIN 1000 MCG: 1000 INJECTION, SOLUTION INTRAMUSCULAR at 11:16

## 2018-08-25 RX ADMIN — FLUOXETINE HYDROCHLORIDE 40 MG: 20 CAPSULE ORAL at 09:08

## 2018-08-25 RX ADMIN — Medication 10 ML: at 20:32

## 2018-08-25 RX ADMIN — ENOXAPARIN SODIUM 40 MG: 40 INJECTION SUBCUTANEOUS at 09:08

## 2018-08-25 RX ADMIN — ASPIRIN 81 MG 81 MG: 81 TABLET ORAL at 19:06

## 2018-08-25 ASSESSMENT — PAIN SCALES - GENERAL
PAINLEVEL_OUTOF10: 0
PAINLEVEL_OUTOF10: 0

## 2018-08-25 NOTE — PROGRESS NOTES
CC:  Patient presented to the ER repeat:  Maye Gerber is a 68 y.o. male presenting to the ED for fall, tremors, beginning earlier today. Pt states he had an open heart surgery in May. Per family members pt has been gradually getting worse since the surgery in terms of him barely being able to walk and having tremors in all of his extremities. Pt states he has not seen a neurologist. Pt is currently taking depression medications and blood pressure medications. Pt reports falling twice today and once yesterday. Pt denies fever, chills, nausea, vomiting, diarrhea, LOC, SOB, cough, hematuria, dysuria. He was recently seen on 8/2 as well and that was reviewed:    CHIEF COMPLAINT:  Near Syncopal Episode    History of Present Illness:  Tian Buckner is a 68 y.o male who presented to the ED with complaints nesr syncopal episodes. Patient was admitted for near syncopal episode. Patient states for the last several weeks he has been battling with dizziness. Today patient states that while walking into work her became dizzy and his vision was blurred. Patient states he fell almost passed out but did not go completely out. In May patient had a 2 vessel CABG. Patient denies any complaints of chest pain, shortness of breath, nausea or vomiting. Patient does have a harsh cough. Since his fall patient is now having some right side/flank discomfort. Patient since his CABG has only been drinking a glass of tea a day. He just recently increased his fluids to 2 glasses of water. Patient had positive orthostatic while in the ED. Patient denies any tobacco, illicit drug or alcohol use.  Patient medical history is significant for HTN, S/p CABG, CAD, HLD, DM II and BPH    I then interviewed this patient:  He states that when he gets up he gets dizzy and his legs give out  He states this is happening since surgery  He also does not have a good appetite he is tremulous-both upper and lower extremities    8/25:  · SUBSEQUENT EVENTS:  · ORTHOSTATIC HYPOTENSION-MEDICATIONS STOPPED, IVF, RAVIN HOSE  · HYPOGLYCEMIA-POOR INTAKE- ALL MEDICATIONS STOPPED    This am he feels much better, tremor almost gone and he feels stronger  Recent orthostatics have not been done    REVIEW OF SYSTEMS:  A comprehensive review of systems was negative except for: what is in the HPI      PMH:  Past Medical History:   Diagnosis Date    BPH (benign prostatic hyperplasia)     ricchuiti    CAD (coronary artery disease)     Diabetes mellitus ([facility] [address]     Hyperlipidemia        Surgical History:  Past Surgical History:   Procedure Laterality Date    APPENDECTOMY      CARDIAC CATHETERIZATION  04/26/2018    Dr. Guillaume Varela ARTHROSCOPY Right     [place] [address] INCL FLUOR GDNCE DX W/CELL WASHG 100 [facility] N/A 5/8/2018    BRONCHOSCOPY DIAGNOSTIC OR CELL [address] ONLY performed by Cherry Cruz DO at [address] [place] [address] CABG, ARTERIAL, FOUR+ N/A 5/8/2018    CABG CORONARY ARTERY BYPASS KAM performed by Leah Bustamante DO at [place] [address] CABG, ARTERIAL, FOUR+ N/A 5/9/2018    CABG CORONARY ARTERY BYPASS performed by Leah Bustamante DO at [facility]         Medications Prior to Admission:    Prior to Admission medications    Medication Sig Start Date End Date Taking?  Authorizing Provider   tamsulosin (FLOMAX) 0.4 MG capsule Take 1 capsule by mouth daily 8/2/18  Yes VIVIANA Amezcua   metoprolol tartrate (LOPRESSOR) 50 MG tablet Take 0.5 tablets by mouth 2 times daily 8/2/18  Yes VIVIANA Amezcua   FLUoxetine (PROZAC) 40 MG capsule Take 1 capsule by mouth every morning 7/17/18  Yes Historical Provider, MD   aspirin 81 MG chewable tablet Take 81 mg by mouth every evening    Yes Historical Provider, MD   atorvastatin (LIPITOR) 40 MG tablet Take 40 mg by mouth every evening    Yes Historical Provider, MD   glipiZIDE (GLIPIZIDE XL) 10 MG CR tablet Take 10 mg by mouth every morning    Yes Historical Provider, MD cyanosis, no clubbing and no edema  Neurologic: tremor minimal now-legs stronger        LABS:  Recent Labs      08/23/18 2146 08/25/18   0551   NA  134  137   K  4.4  3.6   CL  95*  100   CO2  21*  22   BUN  9  12   CREATININE  0.8  0.8   GLUCOSE  131*  94   CALCIUM  9.6  8.6       Recent Labs      08/23/18 2146  08/25/18   0551   WBC  9.9  8.3   RBC  4.83  4.30   HGB  12.7  11.4*   HCT  39.9  36.2*   MCV  82.6  84.2   MCH  26.3  26.5   MCHC  31.8*  31.5*   RDW  17.0*  17.4*   PLT  372  321   MPV  9.6  10.3       No results for input(s): POCGLU in the last 72 hours. PT/INR:    Lab Results   Component Value Date    PROTIME 13.1 08/23/2018    INR 1.1 08/23/2018     PTT:    Lab Results   Component Value Date    APTT 29.2 08/01/2018   [APTT}  Troponin:    Lab Results   Component Value Date    TROPONINI <0.01 08/23/2018       Radiology:   No orders to display       EKG: Normal sinus rhythm, Possible Left atrial enlargement and T wave abnormality, consider anterior ischemia. Prolonged QT. Abnormal ECG, When compared with ECG of 05-MAY-2018 09:38,  Vent.  rate has decreased BY  35 BPM, Nonspecific T wave abnormality now evident in Inferior leads and  T wave inversion now evident in Anterior leads    ASSESSMENT:    · Weakness -likely due to hypoglycemia associated with sulfonylurea use  · Orthostatic hypotension: autonomic diabetic neuropathy and hypoglycemia  · Tremor new onset-since surgery-likely associated with recurrent occult hypoglycemia  · Protein calory malnutrition post operative        PLAN:  · Stopped diabetic medication  · reduced stopped blood pressure medication  · Stopped alpha blocker and bupropion     He is much improved now    Code Status: Full Code   DVT prophylaxis: Lovenox    Electronically signed by Oscar Pandya MD on 8/25/2018 at 9:54 AM

## 2018-08-26 LAB
METER GLUCOSE: 134 MG/DL (ref 70–110)
METER GLUCOSE: 149 MG/DL (ref 70–110)
METER GLUCOSE: 165 MG/DL (ref 70–110)
METER GLUCOSE: 185 MG/DL (ref 70–110)

## 2018-08-26 PROCEDURE — 1200000000 HC SEMI PRIVATE

## 2018-08-26 PROCEDURE — 6370000000 HC RX 637 (ALT 250 FOR IP): Performed by: INTERNAL MEDICINE

## 2018-08-26 PROCEDURE — 6360000002 HC RX W HCPCS: Performed by: INTERNAL MEDICINE

## 2018-08-26 PROCEDURE — 2580000003 HC RX 258: Performed by: INTERNAL MEDICINE

## 2018-08-26 PROCEDURE — 82962 GLUCOSE BLOOD TEST: CPT

## 2018-08-26 RX ADMIN — Medication 10 ML: at 21:06

## 2018-08-26 RX ADMIN — ATORVASTATIN CALCIUM 40 MG: 40 TABLET, FILM COATED ORAL at 17:46

## 2018-08-26 RX ADMIN — ENOXAPARIN SODIUM 40 MG: 40 INJECTION SUBCUTANEOUS at 08:48

## 2018-08-26 RX ADMIN — Medication 10 ML: at 08:48

## 2018-08-26 RX ADMIN — ASPIRIN 81 MG 81 MG: 81 TABLET ORAL at 17:46

## 2018-08-26 RX ADMIN — Medication 1000 MCG: at 08:47

## 2018-08-26 RX ADMIN — FLUOXETINE HYDROCHLORIDE 40 MG: 20 CAPSULE ORAL at 08:47

## 2018-08-26 RX ADMIN — FINASTERIDE 5 MG: 5 TABLET, FILM COATED ORAL at 08:47

## 2018-08-26 ASSESSMENT — PAIN SCALES - GENERAL
PAINLEVEL_OUTOF10: 0
PAINLEVEL_OUTOF10: 0

## 2018-08-26 NOTE — PROGRESS NOTES
CC:  Patient presented to the ER repeat:  Denis Alvarez is a 68 y.o. male presenting to the ED for fall, tremors, beginning earlier today. Pt states he had an open heart surgery in May. Per family members pt has been gradually getting worse since the surgery in terms of him barely being able to walk and having tremors in all of his extremities. Pt states he has not seen a neurologist. Pt is currently taking depression medications and blood pressure medications. Pt reports falling twice today and once yesterday. Pt denies fever, chills, nausea, vomiting, diarrhea, LOC, SOB, cough, hematuria, dysuria. He was recently seen on 8/2 as well and that was reviewed:    CHIEF COMPLAINT:  Near Syncopal Episode    History of Present Illness:  Blanka Burris is a 68 y.o male who presented to the ED with complaints nesr syncopal episodes. Patient was admitted for near syncopal episode. Patient states for the last several weeks he has been battling with dizziness. Today patient states that while walking into work her became dizzy and his vision was blurred. Patient states he fell almost passed out but did not go completely out. In May patient had a 2 vessel CABG. Patient denies any complaints of chest pain, shortness of breath, nausea or vomiting. Patient does have a harsh cough. Since his fall patient is now having some right side/flank discomfort. Patient since his CABG has only been drinking a glass of tea a day. He just recently increased his fluids to 2 glasses of water. Patient had positive orthostatic while in the ED. Patient denies any tobacco, illicit drug or alcohol use.  Patient medical history is significant for HTN, S/p CABG, CAD, HLD, DM II and BPH    I then interviewed this patient:  He states that when he gets up he gets dizzy and his legs give out  He states this is happening since surgery  He also does not have a good appetite he is tremulous-both upper and lower extremities    8/25:  · SUBSEQUENT EVENTS:  · ORTHOSTATIC HYPOTENSION-MEDICATIONS STOPPED, IVF, RAVIN HOSE  · HYPOGLYCEMIA-POOR INTAKE- ALL MEDICATIONS STOPPED    This am he feels much better, tremor almost gone and he feels stronger  Recent orthostatics have not been done    8/26:  He feels even further improved  Glucose now at reasonable levels  Tremor is improved  He ambulates without knees giving out    REVIEW OF SYSTEMS:  A comprehensive review of systems was negative except for: what is in the HPI      PMH:  Past Medical History:   Diagnosis Date    BPH (benign prostatic hyperplasia)     ricchuiti    CAD (coronary artery disease)     Diabetes mellitus ([facility] [address]     Hyperlipidemia        Surgical History:  Past Surgical History:   Procedure Laterality Date    APPENDECTOMY      CARDIAC CATHETERIZATION  04/26/2018    Dr. Kaushal Leroy ARTHROSCOPY Right     [place] [address] INCL FLUOR GDNCE DX W/CELL WASHG 100 [facility] N/A 5/8/2018    BRONCHOSCOPY DIAGNOSTIC OR CELL [address] ONLY performed by Mayte Henry DO at [address] [place] [address] CABG, ARTERIAL, FOUR+ N/A 5/8/2018    CABG CORONARY ARTERY BYPASS KAM performed by Zeeshan Smith DO at [place] [address] CABG, ARTERIAL, FOUR+ N/A 5/9/2018    CABG CORONARY ARTERY BYPASS performed by Zeeshan Smith DO at [facility]         Medications Prior to Admission:    Prior to Admission medications    Medication Sig Start Date End Date Taking?  Authorizing Provider   tamsulosin (FLOMAX) 0.4 MG capsule Take 1 capsule by mouth daily 8/2/18  Yes VIVIANA Bedoya   metoprolol tartrate (LOPRESSOR) 50 MG tablet Take 0.5 tablets by mouth 2 times daily 8/2/18  Yes VIVIANA Bedoya   FLUoxetine (PROZAC) 40 MG capsule Take 1 capsule by mouth every morning 7/17/18  Yes Historical Provider, MD   aspirin 81 MG chewable tablet Take 81 mg by mouth every evening    Yes Historical Provider, MD   atorvastatin (LIPITOR) 40 MG tablet Take 40 mg by mouth every evening    Yes

## 2018-08-26 NOTE — CARE COORDINATION
Social Work/Discharge Planning:      SW spoke with JOHN Steele at [address] stated that precert has not been obtained at his time. RN notified.   Unit SW to follow up tomorrow 8/27    Electronically signed by PEPITO Bill on 8/26/2018 at 11:12 AM

## 2018-08-26 NOTE — PLAN OF CARE
Problem: Falls - Risk of:  Goal: Will remain free from falls  Will remain free from falls   Outcome: Met This Shift      Problem: Nutrition  Goal: Optimal nutrition therapy  Outcome: Met This Shift

## 2018-08-27 VITALS
RESPIRATION RATE: 18 BRPM | WEIGHT: 176.6 LBS | BODY MASS INDEX: 26.76 KG/M2 | DIASTOLIC BLOOD PRESSURE: 81 MMHG | HEART RATE: 86 BPM | HEIGHT: 68 IN | TEMPERATURE: 97.7 F | SYSTOLIC BLOOD PRESSURE: 139 MMHG | OXYGEN SATURATION: 95 %

## 2018-08-27 LAB
ANION GAP SERPL CALCULATED.3IONS-SCNC: 17 MMOL/L (ref 7–16)
BUN BLDV-MCNC: 7 MG/DL (ref 8–23)
CALCIUM SERPL-MCNC: 9 MG/DL (ref 8.6–10.2)
CHLORIDE BLD-SCNC: 100 MMOL/L (ref 98–107)
CO2: 22 MMOL/L (ref 22–29)
CREAT SERPL-MCNC: 0.8 MG/DL (ref 0.7–1.2)
GFR AFRICAN AMERICAN: >60
GFR NON-AFRICAN AMERICAN: >60 ML/MIN/1.73
GLUCOSE BLD-MCNC: 166 MG/DL (ref 74–109)
METER GLUCOSE: 133 MG/DL (ref 70–110)
POTASSIUM SERPL-SCNC: 3.9 MMOL/L (ref 3.5–5)
SODIUM BLD-SCNC: 139 MMOL/L (ref 132–146)

## 2018-08-27 PROCEDURE — 80048 BASIC METABOLIC PNL TOTAL CA: CPT

## 2018-08-27 PROCEDURE — 36415 COLL VENOUS BLD VENIPUNCTURE: CPT

## 2018-08-27 PROCEDURE — 2580000003 HC RX 258: Performed by: INTERNAL MEDICINE

## 2018-08-27 PROCEDURE — 6360000002 HC RX W HCPCS: Performed by: INTERNAL MEDICINE

## 2018-08-27 PROCEDURE — 82962 GLUCOSE BLOOD TEST: CPT

## 2018-08-27 PROCEDURE — 97535 SELF CARE MNGMENT TRAINING: CPT

## 2018-08-27 PROCEDURE — 6370000000 HC RX 637 (ALT 250 FOR IP): Performed by: INTERNAL MEDICINE

## 2018-08-27 PROCEDURE — 97530 THERAPEUTIC ACTIVITIES: CPT

## 2018-08-27 RX ORDER — FLUDROCORTISONE ACETATE 0.1 MG/1
0.1 TABLET ORAL DAILY
Status: DISCONTINUED | OUTPATIENT
Start: 2018-08-27 | End: 2018-08-27 | Stop reason: HOSPADM

## 2018-08-27 RX ORDER — FLUDROCORTISONE ACETATE 0.1 MG/1
0.1 TABLET ORAL DAILY
Refills: 3 | DISCHARGE
Start: 2018-08-28 | End: 2018-09-27

## 2018-08-27 RX ORDER — FLUOXETINE HYDROCHLORIDE 40 MG/1
20 CAPSULE ORAL EVERY MORNING
Qty: 30 CAPSULE | Refills: 0 | DISCHARGE
Start: 2018-08-27 | End: 2019-02-22

## 2018-08-27 RX ADMIN — FLUOXETINE HYDROCHLORIDE 40 MG: 20 CAPSULE ORAL at 09:18

## 2018-08-27 RX ADMIN — FINASTERIDE 5 MG: 5 TABLET, FILM COATED ORAL at 09:18

## 2018-08-27 RX ADMIN — ENOXAPARIN SODIUM 40 MG: 40 INJECTION SUBCUTANEOUS at 09:25

## 2018-08-27 RX ADMIN — FLUDROCORTISONE ACETATE 0.1 MG: 0.1 TABLET ORAL at 09:18

## 2018-08-27 RX ADMIN — Medication 1000 MCG: at 09:18

## 2018-08-27 RX ADMIN — Medication 10 ML: at 09:25

## 2018-08-27 ASSESSMENT — PAIN SCALES - GENERAL
PAINLEVEL_OUTOF10: 0
PAINLEVEL_OUTOF10: 0

## 2018-08-27 NOTE — PROGRESS NOTES
stand: Minimal Assist   Stand pivot:  NT      Sit<>stand: Min A Sit to stand: Stand by Assist   Stand pivot:  Stand by Assist     Functional Mobility Minimal Assist with FWW   Retro lean and poor activity tolerance. 3-4 side steps d/t c/o feeling dizzy and tremors in body  Min A hand held; pt ambulated to bathroom. Demonstrates occasional posterior LOB eddie during turns. Pt educated on functional safety and use of shower seat (wife owns seat) Supervision with FWW with good activity tolerance for IADLS   Sit balance: wfl  Stand balance:  Min A with retro lean  Endurance/Activity tolerance:  fair with moderate activity      Comments: Upon arrival pt in bathroom. At end of session assisted to bedside chair all lines within reach. Pt educated on energy conservation and safety concerns regarding balance and functional mobility. Pt can benefit from use of AD and bathroom DME for fall prevention. · Pt has made F+/G progress towards set goals.    · Continue with current plan of care    Time in: 1100  Time out:1115  Total Tx Time: 15 min    Yung Flynn, OTR/L [id number]

## 2018-08-27 NOTE — DISCHARGE SUMMARY
EVENTS:  · ORTHOSTATIC HYPOTENSION-MEDICATIONS STOPPED, IVF, RAVIN HOSE  · HYPOGLYCEMIA-POOR INTAKE- ALL MEDICATIONS STOPPED    This am he feels much better, tremor almost gone and he feels stronger  Recent orthostatics have not been done    8/26:  He feels even further improved  Glucose now at reasonable levels  Tremor is improved  He ambulates without knees giving out    8/27:  He feels fine  He does not have nay further orthostatic symptoms although his pressure still drops significantly  He has been hydrated  He does not have the RAVIN hose on  He is now having urinary hesitancy off the flomax-will have to resume with caution    REVIEW OF SYSTEMS:  A comprehensive review of systems was negative except for: what is in the HPI      PMH:  Past Medical History:   Diagnosis Date    BPH (benign prostatic hyperplasia)     ricchuiti    CAD (coronary artery disease)     Diabetes mellitus ([facility] [address]     Hyperlipidemia        Surgical History:  Past Surgical History:   Procedure Laterality Date    APPENDECTOMY      CARDIAC CATHETERIZATION  04/26/2018    Dr. Peetr Burden ARTHROSCOPY Right     [place] [address] INCL FLUOR GDNCE DX W/CELL WASHG 100 [facility] N/A 5/8/2018    BRONCHOSCOPY DIAGNOSTIC OR CELL [address] ONLY performed by Leo Catsro DO at [address] CABG, ARTERIAL, FOUR+ N/A 5/8/2018    CABG CORONARY ARTERY BYPASS KAM performed by Sarah Vallejo DO at [address] CABG, ARTERIAL, FOUR+ N/A 5/9/2018    CABG CORONARY ARTERY BYPASS performed by Sarah Vallejo DO at [address]         Medications Prior to Admission:    Prior to Admission medications    Medication Sig Start Date End Date Taking?  Authorizing Provider   FLUoxetine (PROZAC) 40 MG capsule Take 1 capsule by mouth every morning 8/27/18  Yes Natalia Medina MD   fludrocortisone (FLORINEF) 0.1 MG tablet Take 1 tablet by mouth daily 8/28/18 9/27/18 Yes Natalia Medina MD   vitamin B-12 1000 MCG carotid bruits  Abdomen: soft, non-tender, non-distended, normal bowel sounds, no masses or organomegaly  Extremities: no cyanosis, no clubbing and no edema  Neurologic: tremor minimal now-legs stronger        LABS:  Recent Labs      08/25/18   0551  08/27/18   0835   NA  137  139   K  3.6  3.9   CL  100  100   CO2  22  22   BUN  12  7*   CREATININE  0.8  0.8   GLUCOSE  94  166*   CALCIUM  8.6  9.0       Recent Labs      08/25/18   0551   WBC  8.3   RBC  4.30   HGB  11.4*   HCT  36.2*   MCV  84.2   MCH  26.5   MCHC  31.5*   RDW  17.4*   PLT  321   MPV  10.3       No results for input(s): POCGLU in the last 72 hours. PT/INR:    Lab Results   Component Value Date    PROTIME 13.1 08/23/2018    INR 1.1 08/23/2018     PTT:    Lab Results   Component Value Date    APTT 29.2 08/01/2018   [APTT}  Troponin:    Lab Results   Component Value Date    TROPONINI <0.01 08/23/2018       Radiology:   No orders to display       EKG: Normal sinus rhythm, Possible Left atrial enlargement and T wave abnormality, consider anterior ischemia. Prolonged QT. Abnormal ECG, When compared with ECG of 05-MAY-2018 09:38,  Vent.  rate has decreased BY  35 BPM, Nonspecific T wave abnormality now evident in Inferior leads and  T wave inversion now evident in Anterior leads    ASSESSMENT:    · Weakness -likely due to hypoglycemia associated with sulfonylurea use-resolved  · Orthostatic hypotension: autonomic diabetic neuropathy and hypoglycemia-improved   · Tremor new onset-since surgery-likely associated with recurrent occult hypoglycemia-improved  · Protein calory malnutrition post operative  · Urinary hesitancy due to BPH        PLAN:  · Stopped diabetic medication  · reduced stopped blood pressure medication-will resume flomax due to increasing urinary symptoms  · Stopped alpha blocker and bupropion   · Will start florinef  · Will request TEDS at all times  · Orthostatic precautions  · Elevate head of bed to allow orthostatic accomodation  · Titrate up florinef as needed  · He is ready for rehab    He is much improved now-may go to rehab for strenghtening    Code Status: Full Code       Electronically signed by Luis Helms MD on 8/27/2018 at 4:45 PM

## 2018-08-27 NOTE — PROGRESS NOTES
Physical Therapy  Facility/Department: [facility] MED SURG ONC  Daily Treatment Note  NAME: Ananbel Newby  : 1945  MRN: [id number]    Date of Service: 2018  Evaluating Therapist: Scout Negrete PT, DPT         Equipment Recommendations: to be determined.      Room #: 2722/3898-T  DIAGNOSIS: generalized weakness  PRECAUTIONS: fall risk     Social:  Pt lives with wife in a 1 floor plan 2 steps and 1 rails to enter. Prior to admission pt walked with no device.                 Initial Evaluation  Date:  Treatment   18   Short Term/ Long Term   Goals   Was pt agreeable to Eval/treatment? yes Yes      Does pt have pain? No c/o pain  No c/o pain     Bed Mobility  Rolling: SBA  Supine to sit: SBA  Sit to supine: SBA  Scooting: NT NT Modified independent   Transfers Sit to stand: min A   Stand to sit: min A   Stand pivot: min a  Sit to stand: Nancy   Stand to sit: Nancy   Stand pivot: Nancy no device  SBA   Ambulation    3-4 sidesteps with w/w min A  80 feet x 2 reps with Nancy no device 50+ feet with w/w with SBA   Stair negotiation: ascended and descended  Nt NT 2+  steps with 1 rail with CGA   AM-PAC Raw Score 15/24 18/24       Patient education  Pt was educated on safety    Patient response to education:   Pt verbalized understanding Pt demonstrated skill Pt requires further education in this area   x  x     Additional Comments: Pt in bathroom upon arrival, agreeable to treatment session with OT collaboration. Pt ambulated with mild unsteadiness with 2 minor LOBs requiring assistance to correct. Pt exhibited SOB with mobility and was instructed on deep breathing. Tremors noted in BUEs. Pt returned to sitting in bedside chair with all needs met and call light in reach. Time in: 1100  Time out: 200    Pt is making progress toward established Physical Therapy goals. Continue with physical therapy current plan of care.     Rosa Beckwith, PT, DPT  [id number]

## 2018-08-27 NOTE — CARE COORDINATION
8/27/2018 social work transition of care/discharge planning  Sw notified by [employer] that [address] has been received. Sw notified nursing. Sw will complete exempt form and envelope. Family will transport.   Electronically signed by PEPITO Hawkins on 8/27/2018 at 3:26 PM

## 2018-08-27 NOTE — CARE COORDINATION
8/27/2018 social work transition of care/discharge planning  Sw spoke with liaison from [facility], updated therapy notes needed for pre cert. Nurse called for updated therapy notes. Sw will follow and assist prn.   Electronically signed by PEPITO Zhao on 8/27/2018 at 10:48 AM

## 2018-08-29 ENCOUNTER — HOSPITAL ENCOUNTER (OUTPATIENT)
Age: 73
Discharge: HOME | End: 2018-08-31
Payer: MEDICARE

## 2018-08-29 LAB
ALBUMIN SERPL-MCNC: 3 G/DL (ref 3.5–5.2)
ALP BLD-CCNC: 108 U/L (ref 40–129)
ALT SERPL-CCNC: 15 U/L (ref 0–40)
ANION GAP SERPL CALCULATED.3IONS-SCNC: 14 MMOL/L (ref 7–16)
AST SERPL-CCNC: 15 U/L (ref 0–39)
BASOPHILS ABSOLUTE: 0.09 E9/L (ref 0–0.2)
BASOPHILS RELATIVE PERCENT: 1.3 % (ref 0–2)
BILIRUB SERPL-MCNC: 0.3 MG/DL (ref 0–1.2)
BUN BLDV-MCNC: 11 MG/DL (ref 8–23)
CALCIUM SERPL-MCNC: 8.8 MG/DL (ref 8.6–10.2)
CHLORIDE BLD-SCNC: 100 MMOL/L (ref 98–107)
CHOLESTEROL, TOTAL: 111 MG/DL (ref 0–199)
CO2: 25 MMOL/L (ref 22–29)
CREAT SERPL-MCNC: 0.9 MG/DL (ref 0.7–1.2)
EOSINOPHILS ABSOLUTE: 0.19 E9/L (ref 0.05–0.5)
EOSINOPHILS RELATIVE PERCENT: 2.8 % (ref 0–6)
GFR AFRICAN AMERICAN: >60
GFR NON-AFRICAN AMERICAN: >60 ML/MIN/1.73
GLUCOSE BLD-MCNC: 150 MG/DL (ref 74–109)
HBA1C MFR BLD: 6.3 % (ref 4–5.6)
HCT VFR BLD CALC: 38 % (ref 37–54)
HDLC SERPL-MCNC: 51 MG/DL
HEMOGLOBIN: 11.8 G/DL (ref 12.5–16.5)
IMMATURE GRANULOCYTES #: 0.02 E9/L
IMMATURE GRANULOCYTES %: 0.3 % (ref 0–5)
LDL CHOLESTEROL CALCULATED: 45 MG/DL (ref 0–99)
LYMPHOCYTES ABSOLUTE: 2.03 E9/L (ref 1.5–4)
LYMPHOCYTES RELATIVE PERCENT: 29.7 % (ref 20–42)
MCH RBC QN AUTO: 26.7 PG (ref 26–35)
MCHC RBC AUTO-ENTMCNC: 31.1 % (ref 32–34.5)
MCV RBC AUTO: 86 FL (ref 80–99.9)
MONOCYTES ABSOLUTE: 0.85 E9/L (ref 0.1–0.95)
MONOCYTES RELATIVE PERCENT: 12.4 % (ref 2–12)
NEUTROPHILS ABSOLUTE: 3.66 E9/L (ref 1.8–7.3)
NEUTROPHILS RELATIVE PERCENT: 53.5 % (ref 43–80)
PDW BLD-RTO: 18.2 FL (ref 11.5–15)
PLATELET # BLD: 319 E9/L (ref 130–450)
PMV BLD AUTO: 10.9 FL (ref 7–12)
POTASSIUM SERPL-SCNC: 4.1 MMOL/L (ref 3.5–5)
RBC # BLD: 4.42 E12/L (ref 3.8–5.8)
SODIUM BLD-SCNC: 139 MMOL/L (ref 132–146)
T4 FREE: 1.15 NG/DL (ref 0.93–1.7)
TOTAL PROTEIN: 6 G/DL (ref 6.4–8.3)
TRIGL SERPL-MCNC: 77 MG/DL (ref 0–149)
TSH SERPL DL<=0.05 MIU/L-ACNC: 2.4 UIU/ML (ref 0.27–4.2)
VITAMIN B-12: 716 PG/ML (ref 211–946)
VLDLC SERPL CALC-MCNC: 15 MG/DL
WBC # BLD: 6.8 E9/L (ref 4.5–11.5)

## 2018-08-29 PROCEDURE — 84439 ASSAY OF FREE THYROXINE: CPT

## 2018-08-29 PROCEDURE — 85025 COMPLETE CBC W/AUTO DIFF WBC: CPT

## 2018-08-29 PROCEDURE — 83036 HEMOGLOBIN GLYCOSYLATED A1C: CPT

## 2018-08-29 PROCEDURE — 84443 ASSAY THYROID STIM HORMONE: CPT

## 2018-08-29 PROCEDURE — 82607 VITAMIN B-12: CPT

## 2018-08-29 PROCEDURE — 80061 LIPID PANEL: CPT

## 2018-08-29 PROCEDURE — 80053 COMPREHEN METABOLIC PANEL: CPT

## 2018-09-25 ENCOUNTER — APPOINTMENT (OUTPATIENT)
Dept: CT IMAGING | Age: 73
End: 2018-09-25
Payer: MEDICARE

## 2018-09-25 ENCOUNTER — HOSPITAL ENCOUNTER (EMERGENCY)
Age: 73
Discharge: HOME | End: 2018-09-25
Attending: EMERGENCY MEDICINE
Payer: MEDICARE

## 2018-09-25 VITALS
BODY MASS INDEX: 26.67 KG/M2 | RESPIRATION RATE: 19 BRPM | HEART RATE: 82 BPM | SYSTOLIC BLOOD PRESSURE: 140 MMHG | DIASTOLIC BLOOD PRESSURE: 68 MMHG | TEMPERATURE: 98.3 F | HEIGHT: 68 IN | WEIGHT: 176 LBS | OXYGEN SATURATION: 98 %

## 2018-09-25 DIAGNOSIS — R07.89 ATYPICAL CHEST PAIN: Primary | ICD-10-CM

## 2018-09-25 LAB
ANION GAP SERPL CALCULATED.3IONS-SCNC: 13 MMOL/L (ref 7–16)
BASOPHILS ABSOLUTE: 0.07 E9/L (ref 0–0.2)
BASOPHILS RELATIVE PERCENT: 0.8 % (ref 0–2)
BUN BLDV-MCNC: 11 MG/DL (ref 8–23)
CALCIUM SERPL-MCNC: 9.5 MG/DL (ref 8.6–10.2)
CHLORIDE BLD-SCNC: 102 MMOL/L (ref 98–107)
CK MB: 2.8 NG/ML (ref 0–7.7)
CO2: 24 MMOL/L (ref 22–29)
CREAT SERPL-MCNC: 0.8 MG/DL (ref 0.7–1.2)
EKG ATRIAL RATE: 95 BPM
EKG P AXIS: 45 DEGREES
EKG P-R INTERVAL: 150 MS
EKG Q-T INTERVAL: 388 MS
EKG QRS DURATION: 92 MS
EKG QTC CALCULATION (BAZETT): 487 MS
EKG R AXIS: 18 DEGREES
EKG T AXIS: 68 DEGREES
EKG VENTRICULAR RATE: 95 BPM
EOSINOPHILS ABSOLUTE: 0.08 E9/L (ref 0.05–0.5)
EOSINOPHILS RELATIVE PERCENT: 1 % (ref 0–6)
GFR AFRICAN AMERICAN: >60
GFR NON-AFRICAN AMERICAN: >60 ML/MIN/1.73
GLUCOSE BLD-MCNC: 168 MG/DL (ref 74–109)
HCT VFR BLD CALC: 35.2 % (ref 37–54)
HEMOGLOBIN: 11.2 G/DL (ref 12.5–16.5)
IMMATURE GRANULOCYTES #: 0.03 E9/L
IMMATURE GRANULOCYTES %: 0.4 % (ref 0–5)
INR BLD: 1.1
LYMPHOCYTES ABSOLUTE: 1.66 E9/L (ref 1.5–4)
LYMPHOCYTES RELATIVE PERCENT: 19.7 % (ref 20–42)
MCH RBC QN AUTO: 27.4 PG (ref 26–35)
MCHC RBC AUTO-ENTMCNC: 31.8 % (ref 32–34.5)
MCV RBC AUTO: 86.1 FL (ref 80–99.9)
MONOCYTES ABSOLUTE: 0.92 E9/L (ref 0.1–0.95)
MONOCYTES RELATIVE PERCENT: 10.9 % (ref 2–12)
NEUTROPHILS ABSOLUTE: 5.66 E9/L (ref 1.8–7.3)
NEUTROPHILS RELATIVE PERCENT: 67.2 % (ref 43–80)
PDW BLD-RTO: 20 FL (ref 11.5–15)
PLATELET # BLD: 342 E9/L (ref 130–450)
PMV BLD AUTO: 9.7 FL (ref 7–12)
POTASSIUM SERPL-SCNC: 4 MMOL/L (ref 3.5–5)
PROTHROMBIN TIME: 12.8 SEC (ref 9.3–12.4)
RBC # BLD: 4.09 E12/L (ref 3.8–5.8)
SODIUM BLD-SCNC: 139 MMOL/L (ref 132–146)
TOTAL CK: 94 U/L (ref 20–200)
TROPONIN: <0.01 NG/ML (ref 0–0.03)
WBC # BLD: 8.4 E9/L (ref 4.5–11.5)

## 2018-09-25 PROCEDURE — 71275 CT ANGIOGRAPHY CHEST: CPT

## 2018-09-25 PROCEDURE — 96374 THER/PROPH/DIAG INJ IV PUSH: CPT

## 2018-09-25 PROCEDURE — 82550 ASSAY OF CK (CPK): CPT

## 2018-09-25 PROCEDURE — 6360000004 HC RX CONTRAST MEDICATION: Performed by: RADIOLOGY

## 2018-09-25 PROCEDURE — 99285 EMERGENCY DEPT VISIT HI MDM: CPT

## 2018-09-25 PROCEDURE — 85610 PROTHROMBIN TIME: CPT

## 2018-09-25 PROCEDURE — 80048 BASIC METABOLIC PNL TOTAL CA: CPT

## 2018-09-25 PROCEDURE — 6360000002 HC RX W HCPCS: Performed by: EMERGENCY MEDICINE

## 2018-09-25 PROCEDURE — 2580000003 HC RX 258: Performed by: EMERGENCY MEDICINE

## 2018-09-25 PROCEDURE — 85025 COMPLETE CBC W/AUTO DIFF WBC: CPT

## 2018-09-25 PROCEDURE — 70450 CT HEAD/BRAIN W/O DYE: CPT

## 2018-09-25 PROCEDURE — 84484 ASSAY OF TROPONIN QUANT: CPT

## 2018-09-25 PROCEDURE — 6370000000 HC RX 637 (ALT 250 FOR IP): Performed by: EMERGENCY MEDICINE

## 2018-09-25 PROCEDURE — 82553 CREATINE MB FRACTION: CPT

## 2018-09-25 RX ORDER — ASPIRIN 325 MG
325 TABLET ORAL ONCE
Status: COMPLETED | OUTPATIENT
Start: 2018-09-25 | End: 2018-09-25

## 2018-09-25 RX ORDER — KETOROLAC TROMETHAMINE 30 MG/ML
15 INJECTION, SOLUTION INTRAMUSCULAR; INTRAVENOUS ONCE
Status: COMPLETED | OUTPATIENT
Start: 2018-09-25 | End: 2018-09-25

## 2018-09-25 RX ORDER — 0.9 % SODIUM CHLORIDE 0.9 %
1000 INTRAVENOUS SOLUTION INTRAVENOUS ONCE
Status: COMPLETED | OUTPATIENT
Start: 2018-09-25 | End: 2018-09-25

## 2018-09-25 RX ORDER — MECLIZINE HCL 12.5 MG/1
50 TABLET ORAL ONCE
Status: COMPLETED | OUTPATIENT
Start: 2018-09-25 | End: 2018-09-25

## 2018-09-25 RX ADMIN — KETOROLAC TROMETHAMINE 15 MG: 30 INJECTION, SOLUTION INTRAMUSCULAR; INTRAVENOUS at 02:31

## 2018-09-25 RX ADMIN — IOPAMIDOL 70 ML: 755 INJECTION, SOLUTION INTRAVENOUS at 02:18

## 2018-09-25 RX ADMIN — SODIUM CHLORIDE 1000 ML: 9 INJECTION, SOLUTION INTRAVENOUS at 02:31

## 2018-09-25 RX ADMIN — ASPIRIN 325 MG: 325 TABLET ORAL at 02:30

## 2018-09-25 RX ADMIN — MECLIZINE 50 MG: 12.5 TABLET ORAL at 02:30

## 2018-09-25 ASSESSMENT — PAIN DESCRIPTION - PAIN TYPE: TYPE: ACUTE PAIN

## 2018-09-25 ASSESSMENT — PAIN DESCRIPTION - LOCATION: LOCATION: CHEST

## 2018-09-25 ASSESSMENT — PAIN SCALES - GENERAL: PAINLEVEL_OUTOF10: 5

## 2018-09-25 NOTE — ED PROVIDER NOTES
embolus. Left pleural effusion with compressive and rounded lingular and left lower    lobe atelectasis. Postoperative changes as above. Remainder of findings as described above. This report has been electronically signed by Ricardo Carlson MD.      CT Head WO Contrast   Final Result   1. No acute intracranial abnormality. 2. Remainder of findings as above. This report has been electronically signed by Ricardo Carlson MD.          ------------------------- NURSING NOTES AND VITALS REVIEWED ---------------------------   The nursing notes within the ED encounter and vital signs as below have been reviewed. BP (!) 150/71   Pulse 97   Temp 98.3 °F (36.8 °C) (Oral)   Resp 16   Ht 5' 8\" (1.727 m)   Wt 176 lb (79.8 kg)   SpO2 97%   BMI 26.76 kg/m²   Oxygen Saturation Interpretation: Normal      ---------------------------------------------------PHYSICAL EXAM--------------------------------------      Constitutional/General: Alert and oriented x3, well appearing, non toxic in NAD  Head: NC/AT  Eyes: PERRL, EOMI  Mouth: Oropharynx clear, handling secretions, no trismus  Neck: Supple, full ROM, no meningeal signs  Pulmonary: Lungs clear to auscultation bilaterally, no wheezes, rales, or rhonchi. Not in respiratory distress  Cardiovascular:  Regular rate and rhythm, no murmurs, gallops, or rubs. 2+ distal pulses/Chest wall reproducible left-sided chest pain  Abdomen: Soft, non tender, non distended,   Extremities: Moves all extremities x 4.  Warm and well perfused  Skin: warm and dry without rash  Neurologic: GCS 15,  Psych: Normal Affect      ------------------------------ ED COURSE/MEDICAL DECISION MAKING----------------------  Medications   aspirin tablet 325 mg (325 mg Oral Given 9/25/18 0230)   0.9 % sodium chloride bolus (1,000 mLs Intravenous New Bag 9/25/18 0231)   ketorolac (TORADOL) injection 15 mg (15 mg Intravenous Given 9/25/18 0231)   meclizine (ANTIVERT) tablet 50 mg (50 mg Oral

## 2018-10-17 ENCOUNTER — OFFICE VISIT (OUTPATIENT)
Dept: ENDOCRINOLOGY | Age: 73
End: 2018-10-17
Payer: MEDICARE

## 2018-10-17 VITALS
OXYGEN SATURATION: 96 % | BODY MASS INDEX: 27.74 KG/M2 | HEIGHT: 68 IN | DIASTOLIC BLOOD PRESSURE: 82 MMHG | HEART RATE: 81 BPM | WEIGHT: 183 LBS | SYSTOLIC BLOOD PRESSURE: 144 MMHG

## 2018-10-17 DIAGNOSIS — R42 LIGHTHEADEDNESS: ICD-10-CM

## 2018-10-17 DIAGNOSIS — E11.9 TYPE 2 DIABETES MELLITUS WITHOUT COMPLICATION, WITHOUT LONG-TERM CURRENT USE OF INSULIN (HCC): Primary | ICD-10-CM

## 2018-10-17 PROBLEM — E27.40 ADRENAL INSUFFICIENCY (HCC): Status: ACTIVE | Noted: 2018-10-17

## 2018-10-17 PROCEDURE — 99204 OFFICE O/P NEW MOD 45 MIN: CPT | Performed by: INTERNAL MEDICINE

## 2018-10-17 RX ORDER — HYDROXYZINE HYDROCHLORIDE 25 MG/1
25 TABLET, FILM COATED ORAL 3 TIMES DAILY PRN
COMMUNITY
End: 2019-02-22

## 2018-10-17 RX ORDER — FLUDROCORTISONE ACETATE 0.1 MG/1
0.1 TABLET ORAL DAILY
COMMUNITY
End: 2019-06-28 | Stop reason: SDUPTHER

## 2018-10-17 RX ORDER — ALBUTEROL SULFATE 90 UG/1
2 AEROSOL, METERED RESPIRATORY (INHALATION) EVERY 6 HOURS PRN
COMMUNITY
End: 2019-02-22

## 2018-10-17 NOTE — PATIENT INSTRUCTIONS
Recommendations for today's visit  · Increase Metformin to 1000 mg twice a day   · Check Blood sugar 1-2  times/day before meals and at bedtime and send us sugar log in a week or two    These are your blood sugar, blood pressure, cholesterol and A1c goals:  · Blood sugar fastin mg/dl to 130 mg/dl  · Blood sugar before meals: <150 mg/dl  · Peak blood sugar lower than 180 mg/dl  · Bad cholesterol (LDL cholesterol): less than 100 mg/dl  · Blood pressure: less than 140/80 mmHg\  · A1c: between 6.5 - 7%      Steps for managing low blood sugar  1. Eat 15 grams of glucose of simple carbohydrate, as found in:   1 tablespoon sugar, Amari or corn syrup    4 oz (1/2 cup) of juice or regular soda   Glucose Tablet or gel (follow package instruction)   2. Wait 15 min and check blood sugar again   3. Repeat until blood sugar within range  4.  Once within range, follow up with snack or meal within 1 hour      I you have any questions please call Dr. Julissa Shields office       Tiffanie Lozoya MD  Endocrinologist, [facility])   [address] [place] [address]   Phone: [phone]  Fax: [phone]

## 2018-10-17 NOTE — PROGRESS NOTES
ENDOCRINOLOGY CLINIC NOTE   Date of Service: 10/17/2018   Medical Records Reviewed:   Inpatient records, outpatient records, outside records   Care Team:   Primary Care Physician: Valerie Woodard. Provider: Serina Pearson MD     Reason for the visit:   Type II DM, Orthostatic hypotension (adrenal evaluation)     Type of visit:   New visit     History of Present Illness: The history is provided by the patient. No  was used. Accuracy of the patient data is excellent. Hernan Espitia is a very pleasant 68y.o. year-old male was     Hernan Espitia was diagnosed with diabetes at age 80   Currently on Metformin 850 1/2 tab BID   Checks blood sugar twice a day in the morning and at night. AM readings usually around 150   The patient denied any significant hypoglycemic episodes  The patient has been mindful of what has been eating and tries to follow diabetes diet as encouraged   The patient is up to date with eye exam and denied any h/o diabetic retinopathy   The patient performs his own foot care and doesn't see podiatry team   The patient has no issues with diabetes medications supply  Microvascular complications:  no retinopathy, nephropathy, + neuropathy   Macrovascular complications: + CAD (CABg 2018) , PVD, or stroke  Receives Flushot every year and up to date with the Pneumonia vaccine   Father with type II DM       Regarding orthostatic hypotension   He is currently using compression stockings and on Florinef 0.1 mg daily.  The patient denied any h/o adrenal problem or prolonged steroid use   He reported hearing problem with vertigo and didn't have an ENT evaluation before     PAST MEDICAL HISTORY   Past Medical History:   Diagnosis Date    BPH (benign prostatic hyperplasia)     ricchuiti    CAD (coronary artery disease)     Diabetes mellitus ([facility] [address]     Hyperlipidemia       PAST SURGICAL HISTORY   Past Surgical History:   Procedure Laterality Date    APPENDECTOMY      CARDIAC Physical examination:   General: awake alert, oriented x3, no abnormal position or movements. HEENT: normocephalic non traumatic, no exophthalmos   Neck: supple, no LN enlargement, no thyromegaly, no thyroid tenderness, no JVD. Pulm: Clear equal air entry no added sounds, no wheezing or rhonchi   CVS: S1 + S2, no murmur, no heave. Dorsalis pedis pulse palpable   Abd: soft lax, no tenderness, no organomegaly, audible bowel sounds.    Skin: no ulcer, mild callus,  No hyperpigmentation   Neuro: CN intact, monofilament sensation normal, muscle power normal. No tremors   Psych: normal mood, and affect     Review of Laboratory Data:   I have reviewed the following:   Lab Results   Component Value Date/Time    WBC 8.4 09/25/2018 01:17 AM    RBC 4.09 09/25/2018 01:17 AM    HGB 11.2 (L) 09/25/2018 01:17 AM    HCT 35.2 (L) 09/25/2018 01:17 AM    MCV 86.1 09/25/2018 01:17 AM    MCH 27.4 09/25/2018 01:17 AM    MCHC 31.8 (L) 09/25/2018 01:17 AM    RDW 20.0 (H) 09/25/2018 01:17 AM     09/25/2018 01:17 AM    MPV 9.7 09/25/2018 01:17 AM      Lab Results   Component Value Date/Time     09/25/2018 01:17 AM    K 4.0 09/25/2018 01:17 AM    K 3.6 08/25/2018 05:51 AM    CO2 24 09/25/2018 01:17 AM    BUN 11 09/25/2018 01:17 AM    CALCIUM 9.5 09/25/2018 01:17 AM      Lab Results   Component Value Date/Time    ALKPHOS 108 08/29/2018 03:15 AM    AST 15 08/29/2018 03:15 AM    ALT 15 08/29/2018 03:15 AM      Lab Results   Component Value Date/Time    TSH 2.400 08/29/2018 03:15 AM      Lab Results   Component Value Date/Time    LABA1C 6.3 (H) 08/29/2018 03:15 AM      No results found for: ALDODIRENCAL   No results found for: ALPRRATIO   Lab Results   Component Value Date/Time    CHOL 111 08/29/2018 03:15 AM    HDL 51 08/29/2018 03:15 AM    TRIG 77 08/29/2018 03:15 AM      No results found for: PTH   No results found for: VITD25     All labs medical records and images were reviewed independently     ASSESSMENT &

## 2018-11-07 ENCOUNTER — TELEPHONE (OUTPATIENT)
Dept: ENDOCRINOLOGY | Age: 73
End: 2018-11-07

## 2019-02-22 ENCOUNTER — OFFICE VISIT (OUTPATIENT)
Dept: ENDOCRINOLOGY | Age: 74
End: 2019-02-22
Payer: MEDICARE

## 2019-02-22 VITALS
HEART RATE: 87 BPM | WEIGHT: 188.4 LBS | OXYGEN SATURATION: 96 % | RESPIRATION RATE: 16 BRPM | BODY MASS INDEX: 29.57 KG/M2 | SYSTOLIC BLOOD PRESSURE: 138 MMHG | HEIGHT: 67 IN | DIASTOLIC BLOOD PRESSURE: 80 MMHG

## 2019-02-22 DIAGNOSIS — E11.9 TYPE 2 DIABETES MELLITUS WITHOUT COMPLICATION, WITHOUT LONG-TERM CURRENT USE OF INSULIN (HCC): Primary | ICD-10-CM

## 2019-02-22 PROCEDURE — 4040F PNEUMOC VAC/ADMIN/RCVD: CPT | Performed by: INTERNAL MEDICINE

## 2019-02-22 PROCEDURE — 99213 OFFICE O/P EST LOW 20 MIN: CPT | Performed by: INTERNAL MEDICINE

## 2019-02-22 PROCEDURE — G8427 DOCREV CUR MEDS BY ELIG CLIN: HCPCS | Performed by: INTERNAL MEDICINE

## 2019-02-22 PROCEDURE — 3017F COLORECTAL CA SCREEN DOC REV: CPT | Performed by: INTERNAL MEDICINE

## 2019-02-22 PROCEDURE — 1036F TOBACCO NON-USER: CPT | Performed by: INTERNAL MEDICINE

## 2019-02-22 PROCEDURE — 1101F PT FALLS ASSESS-DOCD LE1/YR: CPT | Performed by: INTERNAL MEDICINE

## 2019-02-22 PROCEDURE — 2022F DILAT RTA XM EVC RTNOPTHY: CPT | Performed by: INTERNAL MEDICINE

## 2019-02-22 PROCEDURE — 3046F HEMOGLOBIN A1C LEVEL >9.0%: CPT | Performed by: INTERNAL MEDICINE

## 2019-02-22 PROCEDURE — G8484 FLU IMMUNIZE NO ADMIN: HCPCS | Performed by: INTERNAL MEDICINE

## 2019-02-22 PROCEDURE — 1123F ACP DISCUSS/DSCN MKR DOCD: CPT | Performed by: INTERNAL MEDICINE

## 2019-02-22 PROCEDURE — G8598 ASA/ANTIPLAT THER USED: HCPCS | Performed by: INTERNAL MEDICINE

## 2019-02-22 PROCEDURE — G8417 CALC BMI ABV UP PARAM F/U: HCPCS | Performed by: INTERNAL MEDICINE

## 2019-02-22 RX ORDER — LANCETS
EACH MISCELLANEOUS
Qty: 300 EACH | Refills: 5 | Status: SHIPPED | OUTPATIENT
Start: 2019-02-22

## 2019-02-22 RX ORDER — GLUCOSAMINE HCL/CHONDROITIN SU 500-400 MG
CAPSULE ORAL
Qty: 300 STRIP | Refills: 5 | Status: SHIPPED | OUTPATIENT
Start: 2019-02-22

## 2019-06-03 ENCOUNTER — OFFICE VISIT (OUTPATIENT)
Dept: FAMILY MEDICINE CLINIC | Age: 74
End: 2019-06-03
Payer: MEDICARE

## 2019-06-03 VITALS
BODY MASS INDEX: 30.86 KG/M2 | HEART RATE: 75 BPM | OXYGEN SATURATION: 98 % | HEIGHT: 67 IN | TEMPERATURE: 97.5 F | WEIGHT: 196.6 LBS | DIASTOLIC BLOOD PRESSURE: 88 MMHG | SYSTOLIC BLOOD PRESSURE: 164 MMHG

## 2019-06-03 DIAGNOSIS — I25.810 CORONARY ARTERY DISEASE INVOLVING CORONARY BYPASS GRAFT OF NATIVE HEART WITHOUT ANGINA PECTORIS: ICD-10-CM

## 2019-06-03 DIAGNOSIS — K59.00 CONSTIPATION, UNSPECIFIED CONSTIPATION TYPE: ICD-10-CM

## 2019-06-03 DIAGNOSIS — E11.9 TYPE 2 DIABETES MELLITUS WITHOUT COMPLICATION, WITHOUT LONG-TERM CURRENT USE OF INSULIN (HCC): Primary | ICD-10-CM

## 2019-06-03 DIAGNOSIS — E78.2 MIXED HYPERLIPIDEMIA: ICD-10-CM

## 2019-06-03 DIAGNOSIS — F32.89 OTHER DEPRESSION: ICD-10-CM

## 2019-06-03 DIAGNOSIS — I10 ESSENTIAL HYPERTENSION: Chronic | ICD-10-CM

## 2019-06-03 DIAGNOSIS — J44.9 CHRONIC OBSTRUCTIVE PULMONARY DISEASE, UNSPECIFIED COPD TYPE (HCC): ICD-10-CM

## 2019-06-03 PROBLEM — D62 ACUTE BLOOD LOSS ANEMIA: Status: RESOLVED | Noted: 2018-05-09 | Resolved: 2019-06-03

## 2019-06-03 PROBLEM — I95.9 HYPOTENSION: Status: RESOLVED | Noted: 2018-08-01 | Resolved: 2019-06-03

## 2019-06-03 PROBLEM — E43 SEVERE PROTEIN-CALORIE MALNUTRITION (HCC): Status: RESOLVED | Noted: 2018-08-24 | Resolved: 2019-06-03

## 2019-06-03 PROBLEM — R53.1 GENERALIZED WEAKNESS: Status: RESOLVED | Noted: 2018-08-24 | Resolved: 2019-06-03

## 2019-06-03 PROBLEM — R06.89 RESPIRATORY INSUFFICIENCY: Status: RESOLVED | Noted: 2018-05-08 | Resolved: 2019-06-03

## 2019-06-03 PROCEDURE — 99214 OFFICE O/P EST MOD 30 MIN: CPT | Performed by: NURSE PRACTITIONER

## 2019-06-03 RX ORDER — TAMSULOSIN HYDROCHLORIDE 0.4 MG/1
0.4 CAPSULE ORAL DAILY
COMMUNITY
End: 2019-06-28 | Stop reason: SDUPTHER

## 2019-06-03 RX ORDER — VERAPAMIL HYDROCHLORIDE 120 MG/1
TABLET, FILM COATED ORAL
Refills: 0 | COMMUNITY
Start: 2019-04-19 | End: 2019-06-03 | Stop reason: SINTOL

## 2019-06-03 RX ORDER — LISINOPRIL 30 MG/1
30 TABLET ORAL DAILY
Qty: 30 TABLET | Refills: 0 | Status: SHIPPED | OUTPATIENT
Start: 2019-06-03 | End: 2019-06-28 | Stop reason: SDUPTHER

## 2019-06-03 SDOH — SOCIAL STABILITY: SOCIAL NETWORK: ARE YOU MARRIED, WIDOWED, DIVORCED, SEPARATED, NEVER MARRIED, OR LIVING WITH A PARTNER?: WIDOWED

## 2019-06-03 ASSESSMENT — PATIENT HEALTH QUESTIONNAIRE - PHQ9
1. LITTLE INTEREST OR PLEASURE IN DOING THINGS: 1
SUM OF ALL RESPONSES TO PHQ QUESTIONS 1-9: 2
SUM OF ALL RESPONSES TO PHQ9 QUESTIONS 1 & 2: 2
SUM OF ALL RESPONSES TO PHQ QUESTIONS 1-9: 2
2. FEELING DOWN, DEPRESSED OR HOPELESS: 1

## 2019-06-03 ASSESSMENT — ENCOUNTER SYMPTOMS
CONSTIPATION: 1
ABDOMINAL PAIN: 0
EYE DISCHARGE: 0
EYE PAIN: 0
COUGH: 0
SHORTNESS OF BREATH: 1
DIARRHEA: 0
CHEST TIGHTNESS: 0
TROUBLE SWALLOWING: 0

## 2019-06-03 NOTE — PROGRESS NOTES
(FLORINEF) 0.1 MG tablet Take 0.1 mg by mouth daily      metFORMIN (GLUCOPHAGE) 1000 MG tablet Take 1 tablet by mouth 2 times daily (with meals) 180 tablet 5    vitamin B-12 1000 MCG tablet Take 1 tablet by mouth daily 30 tablet 3    docusate (COLACE, DULCOLAX) 100 MG CAPS Take 100 mg by mouth 2 times daily as needed (constipation) 20 capsule 0    nitroGLYCERIN (NITROSTAT) 0.3 MG SL tablet up to max of 3 total doses. If no relief after 1 dose, call 911. 30 tablet 0    aspirin 325 MG tablet Take 325 mg by mouth every evening       atorvastatin (LIPITOR) 40 MG tablet Take 40 mg by mouth every evening        No current facility-administered medications on file prior to visit. No Known Allergies    Family History   Problem Relation Age of Onset    Breast Cancer Mother     Early Death Father     Diabetes Father     High Blood Pressure Brother     Lung Cancer Brother        Past Surgical History:   Procedure Laterality Date    APPENDECTOMY      CARDIAC CATHETERIZATION  04/26/2018    Dr. Sebas Fair ARTHROSCOPY Right     [place] [address] INCL FLUOR GDNCE DX W/CELL WASHG 100 [facility] N/A 5/8/2018    BRONCHOSCOPY DIAGNOSTIC OR CELL [address] ONLY performed by Madonna Potts DO at [address] [place] [address] CABG, ARTERIAL, FOUR+ N/A 5/8/2018    CABG CORONARY ARTERY BYPASS KAM performed by Maria Eugenia Diggs DO at [place] [address] CABG, ARTERIAL, FOUR+ N/A 5/9/2018    CABG CORONARY ARTERY BYPASS performed by Maria Eugenia Diggs DO at [facility] TONSILLECTOMY         Social History     Tobacco Use    Smoking status: Never Smoker    Smokeless tobacco: Never Used   Substance Use Topics    Alcohol use: No    Drug use: No       ROS:      Review of Systems   Constitutional: Negative for fatigue. HENT: Positive for hearing loss. Negative for congestion, ear pain and trouble swallowing. Eyes: Negative for pain, discharge and visual disturbance.    Respiratory: Positive for shortness of breath. Negative for cough and chest tightness. Cardiovascular: Negative for chest pain, palpitations and leg swelling. CAD-sees Dr Fang Overcast, hyperlipdemia   Gastrointestinal: Positive for constipation. Negative for abdominal pain and diarrhea. Endocrine:        Diabetic   Genitourinary: Negative for difficulty urinating, discharge and testicular pain. Musculoskeletal: Negative for arthralgias. Skin: Negative for rash. Neurological: Positive for tremors. Negative for dizziness, weakness, numbness and headaches. Hematological: Does not bruise/bleed easily. Psychiatric/Behavioral: Positive for dysphoric mood. The patient is nervous/anxious. Vitals:    06/03/19 1611   BP: (!) 164/88   Pulse: 75   Temp: 97.5 °F (36.4 °C)   SpO2: 98%   Weight: 196 lb 9.6 oz (89.2 kg)   Height: 5' 7\" (1.702 m)     Estimated body mass index is 30.79 kg/m² as calculated from the following:    Height as of this encounter: 5' 7\" (1.702 m). Weight as of this encounter: 196 lb 9.6 oz (89.2 kg). PHYSICAL EXAM:    VS:  Blood pressure (!) 164/88, pulse 75, temperature 97.5 °F (36.4 °C), height 5' 7\" (1.702 m), weight 196 lb 9.6 oz (89.2 kg), SpO2 98 %. Physical Exam   Constitutional: He is oriented to person, place, and time. He appears well-developed and well-nourished. He appears distressed. HENT:   Head: Normocephalic. Right Ear: Hearing, tympanic membrane, external ear and ear canal normal.   Left Ear: Hearing, tympanic membrane, external ear and ear canal normal.   Nose: Nose normal. No mucosal edema or rhinorrhea. Mouth/Throat: Oropharynx is clear and moist and mucous membranes are normal. No oropharyngeal exudate. Eyes: Pupils are equal, round, and reactive to light. Conjunctivae, EOM and lids are normal.   Neck: Normal range of motion. Neck supple. No JVD present. Carotid bruit is not present. No thyromegaly present.    Cardiovascular: Normal rate, regular rhythm, S1 normal, S2 normal and intact distal pulses. Murmur heard. Systolic murmur is present with a grade of 2/6.  1+ edema at ankles   Pulmonary/Chest: Effort normal and breath sounds normal. No respiratory distress. He has no wheezes. He has no rales. Abdominal: Soft. Bowel sounds are normal. He exhibits no distension and no mass. There is no hepatosplenomegaly. There is no tenderness. Musculoskeletal: Normal range of motion. He exhibits edema. He exhibits no deformity. Some movement at sternum   Neurological: He is alert and oriented to person, place, and time. He has normal strength and normal reflexes. No cranial nerve deficit or sensory deficit. Coordination and gait normal.   Skin: Skin is warm and dry. Capillary refill takes less than 2 seconds. No rash noted. No cyanosis. Nails show no clubbing. Psychiatric: His speech is normal and behavior is normal. Judgment and thought content normal. Cognition and memory are normal. He exhibits a depressed mood. He expresses no suicidal ideation. Vitals reviewed. ASSESSMENT/PLAN:    Turner Loving was seen today for medication check and anxiety. Diagnoses and all orders for this visit:    Type 2 diabetes mellitus without complication, without long-term current use of insulin ([facility] [address]  Comments:  Need lab  Orders:  -     CBC Auto Differential; Future  -     Lipid Panel; Future  -     Iron and TIBC; Future  -     Hemoglobin A1C; Future  -     Comprehensive Metabolic Panel; Future  -     Cancel: Microalbumin / Creatinine Urine Ratio; Future    Essential hypertension  Comments:  Reviewed the issues. Won't use Metoprolol because of prev hypotension issues.  Add ACE and see back and see cardiology    Mixed hyperlipidemia    Chronic obstructive pulmonary disease, unspecified COPD type ([facility] [address]  Comments:  Stable    Constipation, unspecified constipation type  Comments:  Med change    Coronary artery disease involving coronary bypass graft of native heart without angina pectoris  Comments:  Due

## 2019-06-17 ENCOUNTER — HOSPITAL ENCOUNTER (OUTPATIENT)
Age: 74
Discharge: HOME | End: 2019-06-19
Payer: MEDICARE

## 2019-06-17 DIAGNOSIS — E11.9 TYPE 2 DIABETES MELLITUS WITHOUT COMPLICATION, WITH LONG-TERM CURRENT USE OF INSULIN (HCC): Primary | ICD-10-CM

## 2019-06-17 DIAGNOSIS — Z79.4 TYPE 2 DIABETES MELLITUS WITHOUT COMPLICATION, WITH LONG-TERM CURRENT USE OF INSULIN (HCC): Primary | ICD-10-CM

## 2019-06-17 DIAGNOSIS — E11.9 TYPE 2 DIABETES MELLITUS WITHOUT COMPLICATION, WITHOUT LONG-TERM CURRENT USE OF INSULIN (HCC): ICD-10-CM

## 2019-06-17 LAB
ALBUMIN SERPL-MCNC: 4.3 G/DL (ref 3.5–5.2)
ALP BLD-CCNC: 101 U/L (ref 40–129)
ALT SERPL-CCNC: 13 U/L (ref 0–40)
ANION GAP SERPL CALCULATED.3IONS-SCNC: 15 MMOL/L (ref 7–16)
AST SERPL-CCNC: 15 U/L (ref 0–39)
BASOPHILS ABSOLUTE: 0.07 E9/L (ref 0–0.2)
BASOPHILS RELATIVE PERCENT: 0.8 % (ref 0–2)
BILIRUB SERPL-MCNC: 0.5 MG/DL (ref 0–1.2)
BUN BLDV-MCNC: 20 MG/DL (ref 8–23)
CALCIUM SERPL-MCNC: 9.6 MG/DL (ref 8.6–10.2)
CHLORIDE BLD-SCNC: 102 MMOL/L (ref 98–107)
CHOLESTEROL, TOTAL: 112 MG/DL (ref 0–199)
CO2: 24 MMOL/L (ref 22–29)
CREAT SERPL-MCNC: 1.1 MG/DL (ref 0.7–1.2)
EOSINOPHILS ABSOLUTE: 0.17 E9/L (ref 0.05–0.5)
EOSINOPHILS RELATIVE PERCENT: 1.9 % (ref 0–6)
GFR AFRICAN AMERICAN: >60
GFR NON-AFRICAN AMERICAN: >60 ML/MIN/1.73
GLUCOSE BLD-MCNC: 151 MG/DL (ref 74–99)
HBA1C MFR BLD: 7.1 % (ref 4–5.6)
HCT VFR BLD CALC: 45.1 % (ref 37–54)
HDLC SERPL-MCNC: 64 MG/DL
HEMOGLOBIN: 14.2 G/DL (ref 12.5–16.5)
IMMATURE GRANULOCYTES #: 0.03 E9/L
IMMATURE GRANULOCYTES %: 0.3 % (ref 0–5)
IRON SATURATION: 27 % (ref 20–55)
IRON: 93 MCG/DL (ref 59–158)
LDL CHOLESTEROL CALCULATED: 36 MG/DL (ref 0–99)
LYMPHOCYTES ABSOLUTE: 1.74 E9/L (ref 1.5–4)
LYMPHOCYTES RELATIVE PERCENT: 19.9 % (ref 20–42)
MCH RBC QN AUTO: 31.1 PG (ref 26–35)
MCHC RBC AUTO-ENTMCNC: 31.5 % (ref 32–34.5)
MCV RBC AUTO: 98.7 FL (ref 80–99.9)
MONOCYTES ABSOLUTE: 0.77 E9/L (ref 0.1–0.95)
MONOCYTES RELATIVE PERCENT: 8.8 % (ref 2–12)
NEUTROPHILS ABSOLUTE: 5.98 E9/L (ref 1.8–7.3)
NEUTROPHILS RELATIVE PERCENT: 68.3 % (ref 43–80)
PDW BLD-RTO: 14.2 FL (ref 11.5–15)
PLATELET # BLD: 245 E9/L (ref 130–450)
PMV BLD AUTO: 10.5 FL (ref 7–12)
POTASSIUM SERPL-SCNC: 4.7 MMOL/L (ref 3.5–5)
RBC # BLD: 4.57 E12/L (ref 3.8–5.8)
SODIUM BLD-SCNC: 141 MMOL/L (ref 132–146)
TOTAL IRON BINDING CAPACITY: 344 MCG/DL (ref 250–450)
TOTAL PROTEIN: 7.2 G/DL (ref 6.4–8.3)
TRIGL SERPL-MCNC: 62 MG/DL (ref 0–149)
VLDLC SERPL CALC-MCNC: 12 MG/DL
WBC # BLD: 8.8 E9/L (ref 4.5–11.5)

## 2019-06-17 PROCEDURE — 80053 COMPREHEN METABOLIC PANEL: CPT

## 2019-06-17 PROCEDURE — 83540 ASSAY OF IRON: CPT

## 2019-06-17 PROCEDURE — 83550 IRON BINDING TEST: CPT

## 2019-06-17 PROCEDURE — 36415 COLL VENOUS BLD VENIPUNCTURE: CPT

## 2019-06-17 PROCEDURE — 83036 HEMOGLOBIN GLYCOSYLATED A1C: CPT

## 2019-06-17 PROCEDURE — 80061 LIPID PANEL: CPT

## 2019-06-17 PROCEDURE — 85025 COMPLETE CBC W/AUTO DIFF WBC: CPT

## 2019-06-24 ENCOUNTER — OFFICE VISIT (OUTPATIENT)
Dept: ENDOCRINOLOGY | Age: 74
End: 2019-06-24
Payer: MEDICARE

## 2019-06-24 VITALS
WEIGHT: 191.6 LBS | OXYGEN SATURATION: 96 % | HEIGHT: 67 IN | HEART RATE: 80 BPM | BODY MASS INDEX: 30.07 KG/M2 | SYSTOLIC BLOOD PRESSURE: 160 MMHG | DIASTOLIC BLOOD PRESSURE: 90 MMHG

## 2019-06-24 DIAGNOSIS — E11.9 TYPE 2 DIABETES MELLITUS WITHOUT COMPLICATION, WITHOUT LONG-TERM CURRENT USE OF INSULIN (HCC): Primary | ICD-10-CM

## 2019-06-24 PROCEDURE — 2022F DILAT RTA XM EVC RTNOPTHY: CPT | Performed by: INTERNAL MEDICINE

## 2019-06-24 PROCEDURE — G8417 CALC BMI ABV UP PARAM F/U: HCPCS | Performed by: INTERNAL MEDICINE

## 2019-06-24 PROCEDURE — 1036F TOBACCO NON-USER: CPT | Performed by: INTERNAL MEDICINE

## 2019-06-24 PROCEDURE — 1123F ACP DISCUSS/DSCN MKR DOCD: CPT | Performed by: INTERNAL MEDICINE

## 2019-06-24 PROCEDURE — 4040F PNEUMOC VAC/ADMIN/RCVD: CPT | Performed by: INTERNAL MEDICINE

## 2019-06-24 PROCEDURE — G8427 DOCREV CUR MEDS BY ELIG CLIN: HCPCS | Performed by: INTERNAL MEDICINE

## 2019-06-24 PROCEDURE — 99214 OFFICE O/P EST MOD 30 MIN: CPT | Performed by: INTERNAL MEDICINE

## 2019-06-24 PROCEDURE — 3045F PR MOST RECENT HEMOGLOBIN A1C LEVEL 7.0-9.0%: CPT | Performed by: INTERNAL MEDICINE

## 2019-06-24 PROCEDURE — 3017F COLORECTAL CA SCREEN DOC REV: CPT | Performed by: INTERNAL MEDICINE

## 2019-06-24 PROCEDURE — G8598 ASA/ANTIPLAT THER USED: HCPCS | Performed by: INTERNAL MEDICINE

## 2019-06-24 NOTE — PROGRESS NOTES
ENDOCRINOLOGY CLINIC NOTE   Date of Service: 6/24/2019     Primary Care Physician: KIM Zarco - MIMI. Provider: Jose Pool MD     Reason for the visit:   Type II DM, Orthostatic hypotension (adrenal evaluation)     History of Present Illness: The history is provided by the patient. No  was used. Accuracy of the patient data is excellent. Magnolia Mckinley is a very pleasant 69y.o. year-old male was     Magnolia Mckinley was diagnosed with diabetes at age 80   Currently on Metformin 1000 mg BID   A1c 7%   Checks blood sugar twice a day in the morning and at night. Most readings at goal   The patient denied any significant hypoglycemic episodes  The patient has been mindful of what has been eating and tries to follow diabetes diet as encouraged   The patient is up to date with eye exam and denied any h/o diabetic retinopathy   The patient performs his own foot care and doesn't see podiatry team   The patient has no issues with diabetes medications supply  Microvascular complications:  no retinopathy, nephropathy, + neuropathy   Macrovascular complications: + CAD (CABG 2018) , PVD, or stroke  Receives Flushot every year and up to date with the Pneumonia vaccine     Regarding orthostatic hypotension   He is currently using compression stockings and on Florinef 0.1 mg daily.  The patient denied any h/o adrenal problem or prolonged steroid use   He reported hearing problem with vertigo and didn't have an ENT evaluation before     PAST MEDICAL HISTORY   Past Medical History:   Diagnosis Date    Acute blood loss anemia 5/9/2018    BPH (benign prostatic hyperplasia)     ricchuiti    CAD (coronary artery disease)     Constipation     COPD (chronic obstructive pulmonary disease) ([address]     Diabetes mellitus ([address]     Hyperlipidemia     Hypotension 8/1/2018    Respiratory insufficiency 5/8/2018    Severe protein-calorie malnutrition ([address] 8/24/2018      PAST SURGICAL HISTORY   Past Surgical History:   Procedure Laterality Date    APPENDECTOMY      CARDIAC CATHETERIZATION  04/26/2018    Dr. Yina Choi ARTHROSCOPY Right     [place] [address] INCL FLUOR GDNCE DX W/CELL WASHG [facility] N/A 5/8/2018    BRONCHOSCOPY DIAGNOSTIC OR CELL [facility] ONLY performed by Modesto Klein DO at [facility] CABG, ARTERIAL, FOUR+ N/A 5/8/2018    CABG CORONARY ARTERY BYPASS KAM performed by Agustina Ferro DO at [address] [place] CABG, ARTERIAL, FOUR+ N/A 5/9/2018    CABG CORONARY ARTERY BYPASS performed by Agustina Ferro DO at [address] [place] [address] History     Socioeconomic History    Marital status:      Spouse name: Not on file    Number of children: Not on file    Years of education: Not on file    Highest education level: Not on file   Occupational History    Occupation: retired    Social Needs    Financial resource strain: Not on file    Food insecurity:     Worry: Not on file     Inability: Not on file   [employer] needs:     Medical: Not on file     Non-medical: Not on file   Tobacco Use    Smoking status: Never Smoker    Smokeless tobacco: Never Used   Substance and Sexual Activity    Alcohol use: No    Drug use: No    Sexual activity: Not on file   Lifestyle    Physical activity:     Days per week: Not on file     Minutes per session: Not on file    Stress: Not on file   Relationships    Social connections:     Talks on phone: Not on file     Gets together: Not on file     Attends Shinto service: Not on file     Active member of club or organization: Not on file     Attends meetings of clubs or organizations: Not on file     Relationship status:      Intimate partner violence:     Fear of current or ex partner: Not on file     Emotionally abused: Not on file     Physically abused: Not on file     Forced sexual activity: Not on file   Other Topics Concern    Not on file   Social History 09/25/18 176 lb (79.8 kg)   08/24/18 176 lb 9.6 oz (80.1 kg)        Physical examination:   General: awake alert, oriented x3, no abnormal position or movements. HEENT: normocephalic non traumatic, no exophthalmos   Neck: supple, no LN enlargement, no thyromegaly, no thyroid tenderness, no JVD. Pulm: Clear equal air entry no added sounds, no wheezing or rhonchi   CVS: S1 + S2, no murmur, no heave. Dorsalis pedis pulse palpable   Abd: soft lax, no tenderness, no organomegaly, audible bowel sounds.    Skin: no ulcer, mild callus,  No hyperpigmentation   Neuro: CN intact, monofilament sensation normal, muscle power normal. No tremors   Psych: normal mood, and affect     Review of Laboratory Data:   I have reviewed the following:   Lab Results   Component Value Date/Time    WBC 8.8 06/17/2019 09:10 AM    RBC 4.57 06/17/2019 09:10 AM    HGB 14.2 06/17/2019 09:10 AM    HCT 45.1 06/17/2019 09:10 AM    MCV 98.7 06/17/2019 09:10 AM    MCH 31.1 06/17/2019 09:10 AM    MCHC 31.5 (L) 06/17/2019 09:10 AM    RDW 14.2 06/17/2019 09:10 AM     06/17/2019 09:10 AM    MPV 10.5 06/17/2019 09:10 AM      Lab Results   Component Value Date/Time     06/17/2019 09:10 AM    K 4.7 06/17/2019 09:10 AM    K 3.6 08/25/2018 05:51 AM    CO2 24 06/17/2019 09:10 AM    BUN 20 06/17/2019 09:10 AM    CALCIUM 9.6 06/17/2019 09:10 AM      Lab Results   Component Value Date/Time    ALKPHOS 101 06/17/2019 09:10 AM    AST 15 06/17/2019 09:10 AM    ALT 13 06/17/2019 09:10 AM      Lab Results   Component Value Date/Time    TSH 2.400 08/29/2018 03:15 AM      Lab Results   Component Value Date/Time    LABA1C 7.1 (H) 06/17/2019 09:10 AM      No results found for: ALDODIRENCAL   No results found for: ALPRRATIO   Lab Results   Component Value Date/Time    CHOL 112 06/17/2019 09:10 AM    HDL 64 06/17/2019 09:10 AM    TRIG 62 06/17/2019 09:10 AM      No results found for: PTH   No results found for: VITD25     Medical Records/Labs/Images review:   I personally reviewed and summarized previous records   All labs were reviewed independently     Honorio Sanofrd [address], a 69 y.o.-old male seen in for the following issues     Diabetes Mellitus Type 2    · Under very good control   · Continue Metformin to 1000 mg BID   · Check BS twice a day and send us sugar log in few weeks   · If BS remained above goal, will consider adding Januvia or Tradjenta   · Optimal blood sugars: 100-140 pre-prandial, < 180 post-prandial. BG above 200 increases risk of dehydration, glycosuria, and DKA. · Patient up to date with the routine diabetes maintenance and prevention     Postural hypotensions   · On Florinef  · Managed by primary physician     Return in about 6 months (around 12/24/2019) for dm TYPE 2 . The above issues were reviewed with the patient who understood and agreed with the plan. Thank you for allowing us to participate in the care of this patient. Please do not hesitate to contact us with any additional questions. Diagnosis Orders   1.  Type 2 diabetes mellitus without complication, without long-term current use of insulin ([facility]  Basic Metabolic Panel    Hemoglobin A1C       Amadou Uribe MD   Endocrinologist, [facility])   [address] [facility], [address] [place] [address],[facility] [id number]   Phone: [phone]   Fax: [phone]

## 2019-06-28 ENCOUNTER — OFFICE VISIT (OUTPATIENT)
Dept: FAMILY MEDICINE CLINIC | Age: 74
End: 2019-06-28
Payer: MEDICARE

## 2019-06-28 VITALS
WEIGHT: 193 LBS | DIASTOLIC BLOOD PRESSURE: 79 MMHG | SYSTOLIC BLOOD PRESSURE: 145 MMHG | OXYGEN SATURATION: 98 % | HEIGHT: 67 IN | TEMPERATURE: 98.3 F | HEART RATE: 81 BPM | BODY MASS INDEX: 30.29 KG/M2

## 2019-06-28 DIAGNOSIS — E11.9 TYPE 2 DIABETES MELLITUS WITHOUT COMPLICATION, WITHOUT LONG-TERM CURRENT USE OF INSULIN (HCC): ICD-10-CM

## 2019-06-28 DIAGNOSIS — I10 ESSENTIAL HYPERTENSION: Primary | Chronic | ICD-10-CM

## 2019-06-28 DIAGNOSIS — E27.40 ADRENAL INSUFFICIENCY (HCC): ICD-10-CM

## 2019-06-28 DIAGNOSIS — F33.41 RECURRENT MAJOR DEPRESSIVE DISORDER, IN PARTIAL REMISSION (HCC): ICD-10-CM

## 2019-06-28 DIAGNOSIS — J44.9 CHRONIC OBSTRUCTIVE PULMONARY DISEASE, UNSPECIFIED COPD TYPE (HCC): ICD-10-CM

## 2019-06-28 PROCEDURE — 99214 OFFICE O/P EST MOD 30 MIN: CPT | Performed by: NURSE PRACTITIONER

## 2019-06-28 RX ORDER — NITROGLYCERIN 0.3 MG/1
TABLET SUBLINGUAL
Qty: 30 TABLET | Refills: 0 | Status: SHIPPED
Start: 2019-06-28 | End: 2020-05-08

## 2019-06-28 RX ORDER — ATORVASTATIN CALCIUM 40 MG/1
40 TABLET, FILM COATED ORAL EVERY EVENING
Qty: 90 TABLET | Refills: 3 | Status: SHIPPED
Start: 2019-06-28 | End: 2020-05-08 | Stop reason: SDUPTHER

## 2019-06-28 RX ORDER — GUAIFENESIN 600 MG/1
600 TABLET, EXTENDED RELEASE ORAL 2 TIMES DAILY
Qty: 60 TABLET | Refills: 0 | Status: SHIPPED | OUTPATIENT
Start: 2019-06-28 | End: 2019-07-01 | Stop reason: SDUPTHER

## 2019-06-28 RX ORDER — LISINOPRIL 40 MG/1
40 TABLET ORAL DAILY
Qty: 90 TABLET | Refills: 2 | Status: SHIPPED
Start: 2019-06-28 | End: 2020-02-19 | Stop reason: SDUPTHER

## 2019-06-28 RX ORDER — TAMSULOSIN HYDROCHLORIDE 0.4 MG/1
0.4 CAPSULE ORAL DAILY
Qty: 90 CAPSULE | Refills: 3 | Status: SHIPPED
Start: 2019-06-28 | End: 2020-05-08 | Stop reason: SDUPTHER

## 2019-06-28 RX ORDER — FLUDROCORTISONE ACETATE 0.1 MG/1
0.1 TABLET ORAL DAILY
Qty: 90 TABLET | Refills: 3 | Status: SHIPPED | OUTPATIENT
Start: 2019-06-28 | End: 2020-07-27

## 2019-06-28 ASSESSMENT — ENCOUNTER SYMPTOMS
ABDOMINAL PAIN: 0
COUGH: 0
EYE PAIN: 0
SHORTNESS OF BREATH: 1
CHEST TIGHTNESS: 0
TROUBLE SWALLOWING: 0
CONSTIPATION: 1
EYE DISCHARGE: 0
DIARRHEA: 0

## 2019-06-28 NOTE — PROGRESS NOTES
2019     Lux Dempsey (:  1945) is a 69 y.o. male, here for evaluation of the following medical concerns:  Chief Complaint   Patient presents with    Medication Check    Other     trouble swollowing        HPI:  69 y.o. male presents for depression and BP recheck. Larges amounts of thick mucus which causes him stress in am  When lying feels uncomfortable in chest/sternum but  States he understands about healing and will discuss again with cardiology  States he is not crying and feeling much improved. Saw endocrinologist yesterday. He was pleased, no changes. No seeing him for 6mo   Working out regularly and PT is working with neck issues and now shoulder  HA with stress is better  Had Epley maneuver done at ENT which made the dizziness better, still some with getting up and turning quickly, mostly in am but now hearing \"crickets\". Seeing them back  Due to see cardiology Dr Sanaz Mendez next month      Health Maintenance:  Influenza Vaccination - (10/12/2018)  Pneumonia Vaccination  Colonoscopy - 2016 polyps        Past Medical History:   Diagnosis Date    Acute blood loss anemia 2018    BPH (benign prostatic hyperplasia)     ricchuiti    CAD (coronary artery disease)     Constipation     [facility]) [facility]     Diabetes mellitus ([facility]     Hyperlipidemia     Hypotension 2018    Respiratory insufficiency 2018    Severe protein-calorie malnutrition ([facility] 2018       Current Outpatient Medications on File Prior to Visit   Medication Sig Dispense Refill    metFORMIN (GLUCOPHAGE) 1000 MG tablet Take 1 tablet by mouth 2 times daily (with meals) 180 tablet 5    Blood Glucose Monitoring Suppl MISC OneTouch Glucometer 1 each 0    ONE TOUCH ULTRASOFT LANCETS MISC Test 3 times a day before meals and as needed for symptoms of irregular blood glucose. 300 each 5    blood glucose monitor strips One-Touch Ultra strips.  Check 3 times a day before meals and as needed for symptoms of irregular blood glucose 300 strip 5    vitamin B-12 1000 MCG tablet Take 1 tablet by mouth daily 30 tablet 3    docusate (COLACE, DULCOLAX) 100 MG CAPS Take 100 mg by mouth 2 times daily as needed (constipation) 20 capsule 0    aspirin 325 MG tablet Take 325 mg by mouth every evening        No current facility-administered medications on file prior to visit. No Known Allergies    Family History   Problem Relation Age of Onset    Breast Cancer Mother     Early Death Father     Diabetes Father     High Blood Pressure Brother     Lung Cancer Brother        Past Surgical History:   Procedure Laterality Date    APPENDECTOMY      CARDIAC CATHETERIZATION  04/26/2018    Dr. Ashish Ron ARTHROSCOPY Right     [place] [address] INCL FLUOR GDNCE DX W/CELL WASHG 100 [facility] N/A 5/8/2018    BRONCHOSCOPY DIAGNOSTIC OR CELL [address] ONLY performed by Lori Velazquez DO at [facility] CABG, ARTERIAL, FOUR+ N/A 5/8/2018    CABG CORONARY ARTERY BYPASS KAM performed by Butch Cristina DO at [address] [facility] CABG, ARTERIAL, FOUR+ N/A 5/9/2018    CABG CORONARY ARTERY BYPASS performed by Butch Cristina DO at [address] TONSILLECTOMY         Social History     Tobacco Use    Smoking status: Never Smoker    Smokeless tobacco: Never Used   Substance Use Topics    Alcohol use: No    Drug use: No       ROS:      Review of Systems   Constitutional: Negative for fatigue. HENT: Positive for hearing loss and tinnitus. Negative for congestion, ear pain and trouble swallowing. Eyes: Negative for pain, discharge and visual disturbance. Respiratory: Positive for shortness of breath. Negative for cough and chest tightness. Cardiovascular: Negative for chest pain, palpitations and leg swelling. CAD-sees Dr Flaquito Padilla, hyperlipdemia   Gastrointestinal: Positive for constipation. Negative for abdominal pain and diarrhea.    Endocrine:        Diabetic Genitourinary: Negative for difficulty urinating, discharge and testicular pain. Musculoskeletal: Negative for arthralgias. Skin: Negative for rash. Neurological: Positive for tremors. Negative for dizziness, weakness, numbness and headaches. Hematological: Does not bruise/bleed easily. Psychiatric/Behavioral: Positive for dysphoric mood. The patient is nervous/anxious. Vitals:    06/28/19 1421 06/28/19 1422   BP: (!) 142/84 (!) 145/79   Pulse: 81    Temp: 98.3 °F (36.8 °C)    SpO2: 98%    Weight: 193 lb (87.5 kg)    Height: 5' 7\" (1.702 m)      Estimated body mass index is 30.23 kg/m² as calculated from the following:    Height as of this encounter: 5' 7\" (1.702 m). Weight as of this encounter: 193 lb (87.5 kg). PHYSICAL EXAM:    VS:  Blood pressure (!) 145/79, pulse 81, temperature 98.3 °F (36.8 °C), height 5' 7\" (1.702 m), weight 193 lb (87.5 kg), SpO2 98 %. Physical Exam   Constitutional: He is oriented to person, place, and time. He appears well-developed and well-nourished. No distress. HENT:   Head: Normocephalic. Right Ear: Hearing, tympanic membrane, external ear and ear canal normal.   Left Ear: Hearing, tympanic membrane, external ear and ear canal normal.   Nose: Nose normal. No mucosal edema or rhinorrhea. Mouth/Throat: Oropharynx is clear and moist and mucous membranes are normal.   Thick clear PND   Eyes: Pupils are equal, round, and reactive to light. Conjunctivae, EOM and lids are normal.   Neck: Normal range of motion. Neck supple. No JVD present. Carotid bruit is not present. No thyromegaly present. Cardiovascular: Normal rate, regular rhythm, S1 normal, S2 normal and intact distal pulses. Murmur heard. Systolic murmur is present with a grade of 2/6.  1+ edema at ankles   Pulmonary/Chest: Effort normal and breath sounds normal. No respiratory distress. He has no wheezes. He has no rales. Abdominal: Soft.  Bowel sounds are normal. He exhibits no distension and no mass. There is no hepatosplenomegaly. There is no tenderness. Musculoskeletal: Normal range of motion. He exhibits no edema or deformity. Some movement at sternum   Neurological: He is alert and oriented to person, place, and time. He has normal strength and normal reflexes. No cranial nerve deficit or sensory deficit. Coordination and gait normal.   Skin: Skin is warm and dry. Capillary refill takes less than 2 seconds. No rash noted. No cyanosis. Nails show no clubbing. Psychiatric: His speech is normal and behavior is normal. Judgment and thought content normal. Cognition and memory are normal. He expresses no suicidal ideation. Mood is improved   Vitals reviewed. ASSESSMENT/PLAN:    Jose Etienne was seen today for medication check and other. Diagnoses and all orders for this visit:    Essential hypertension    Recurrent major depressive disorder, in partial remission ([facility]    Adrenal insufficiency (HCC)    Type 2 diabetes mellitus without complication, without long-term current use of insulin (HCC)    Chronic obstructive pulmonary disease, unspecified COPD type ([facility]    Other orders  -     atorvastatin (LIPITOR) 40 MG tablet; Take 1 tablet by mouth every evening  -     nitroGLYCERIN (NITROSTAT) 0.3 MG SL tablet; up to max of 3 total doses. If no relief after 1 dose, call 911.  -     tamsulosin (FLOMAX) 0.4 MG capsule; Take 1 capsule by mouth daily  -     fludrocortisone (FLORINEF) 0.1 MG tablet; Take 1 tablet by mouth daily  -     lisinopril (PRINIVIL;ZESTRIL) 40 MG tablet; Take 1 tablet by mouth daily  -     sertraline (ZOLOFT) 50 MG tablet; Take 1 tablet by mouth daily  -     guaiFENesin (MUCINEX) 600 MG extended release tablet;  Take 1 tablet by mouth 2 times daily     Plan   Cont with Zoloft-call is not continuing to improve  Increase BP med and monitor  Try Mucinex for the thick mucus issue- call if this doesn't help  Cont to monitor bl sugars    No orders of the defined types were placed in this encounter. Return for Routine office visit. An electronic signature was used to authenticate this note.     --KIM Landrum - CNP on 6/28/2019 at 3:37 PM

## 2019-07-01 RX ORDER — GUAIFENESIN 600 MG/1
600 TABLET, EXTENDED RELEASE ORAL 2 TIMES DAILY
Qty: 60 TABLET | Refills: 0 | Status: SHIPPED | OUTPATIENT
Start: 2019-07-01 | End: 2019-07-31

## 2019-09-04 ENCOUNTER — TELEPHONE (OUTPATIENT)
Dept: CARDIOTHORACIC SURGERY | Age: 74
End: 2019-09-04

## 2019-09-06 ENCOUNTER — HOSPITAL ENCOUNTER (OUTPATIENT)
Age: 74
Discharge: HOME | End: 2019-09-08
Payer: MEDICARE

## 2019-09-06 ENCOUNTER — HOSPITAL ENCOUNTER (OUTPATIENT)
Dept: GENERAL RADIOLOGY | Age: 74
Discharge: HOME | End: 2019-09-08
Payer: MEDICARE

## 2019-09-06 DIAGNOSIS — Z95.1 S/P CABG X 2: Primary | ICD-10-CM

## 2019-09-06 DIAGNOSIS — Z95.1 S/P CABG X 2: ICD-10-CM

## 2019-09-06 PROCEDURE — 71046 X-RAY EXAM CHEST 2 VIEWS: CPT

## 2019-09-10 ENCOUNTER — OFFICE VISIT (OUTPATIENT)
Dept: CARDIOTHORACIC SURGERY | Age: 74
End: 2019-09-10
Payer: MEDICARE

## 2019-09-10 VITALS
DIASTOLIC BLOOD PRESSURE: 80 MMHG | HEART RATE: 64 BPM | SYSTOLIC BLOOD PRESSURE: 132 MMHG | WEIGHT: 196 LBS | BODY MASS INDEX: 29.7 KG/M2 | HEIGHT: 68 IN | RESPIRATION RATE: 18 BRPM

## 2019-09-10 DIAGNOSIS — M96.89 NONUNION OF STERNUM AFTER STERNOTOMY: Primary | ICD-10-CM

## 2019-09-10 DIAGNOSIS — J90 PLEURAL EFFUSION, LEFT: ICD-10-CM

## 2019-09-10 PROCEDURE — G8598 ASA/ANTIPLAT THER USED: HCPCS | Performed by: THORACIC SURGERY (CARDIOTHORACIC VASCULAR SURGERY)

## 2019-09-10 PROCEDURE — 4040F PNEUMOC VAC/ADMIN/RCVD: CPT | Performed by: THORACIC SURGERY (CARDIOTHORACIC VASCULAR SURGERY)

## 2019-09-10 PROCEDURE — 3017F COLORECTAL CA SCREEN DOC REV: CPT | Performed by: THORACIC SURGERY (CARDIOTHORACIC VASCULAR SURGERY)

## 2019-09-10 PROCEDURE — G8417 CALC BMI ABV UP PARAM F/U: HCPCS | Performed by: THORACIC SURGERY (CARDIOTHORACIC VASCULAR SURGERY)

## 2019-09-10 PROCEDURE — 1036F TOBACCO NON-USER: CPT | Performed by: THORACIC SURGERY (CARDIOTHORACIC VASCULAR SURGERY)

## 2019-09-10 PROCEDURE — 99203 OFFICE O/P NEW LOW 30 MIN: CPT | Performed by: THORACIC SURGERY (CARDIOTHORACIC VASCULAR SURGERY)

## 2019-09-10 PROCEDURE — 1123F ACP DISCUSS/DSCN MKR DOCD: CPT | Performed by: THORACIC SURGERY (CARDIOTHORACIC VASCULAR SURGERY)

## 2019-09-10 PROCEDURE — G8427 DOCREV CUR MEDS BY ELIG CLIN: HCPCS | Performed by: THORACIC SURGERY (CARDIOTHORACIC VASCULAR SURGERY)

## 2019-09-10 ASSESSMENT — ENCOUNTER SYMPTOMS
CONSTIPATION: 0
ABDOMINAL PAIN: 0
COUGH: 0

## 2019-09-12 ENCOUNTER — TELEPHONE (OUTPATIENT)
Dept: CARDIOTHORACIC SURGERY | Age: 74
End: 2019-09-12

## 2019-09-12 DIAGNOSIS — J90 PLEURAL EFFUSION: Primary | ICD-10-CM

## 2019-09-12 DIAGNOSIS — S22.20XA CLOSED FRACTURE OF STERNUM, UNSPECIFIED PORTION OF STERNUM, INITIAL ENCOUNTER: ICD-10-CM

## 2019-09-26 RX ORDER — VERAPAMIL HYDROCHLORIDE 120 MG/1
120 TABLET, FILM COATED ORAL
COMMUNITY

## 2019-09-27 ENCOUNTER — OFFICE VISIT (OUTPATIENT)
Dept: FAMILY MEDICINE CLINIC | Age: 74
End: 2019-09-27
Payer: MEDICARE

## 2019-09-27 VITALS
TEMPERATURE: 97.9 F | HEIGHT: 68 IN | BODY MASS INDEX: 30.4 KG/M2 | SYSTOLIC BLOOD PRESSURE: 132 MMHG | OXYGEN SATURATION: 96 % | DIASTOLIC BLOOD PRESSURE: 70 MMHG | WEIGHT: 200.6 LBS | HEART RATE: 87 BPM

## 2019-09-27 DIAGNOSIS — F33.41 RECURRENT MAJOR DEPRESSIVE DISORDER, IN PARTIAL REMISSION (HCC): ICD-10-CM

## 2019-09-27 DIAGNOSIS — K59.00 CONSTIPATION, UNSPECIFIED CONSTIPATION TYPE: ICD-10-CM

## 2019-09-27 DIAGNOSIS — I25.810 CORONARY ARTERY DISEASE INVOLVING CORONARY BYPASS GRAFT OF NATIVE HEART WITHOUT ANGINA PECTORIS: ICD-10-CM

## 2019-09-27 DIAGNOSIS — E11.9 TYPE 2 DIABETES MELLITUS WITHOUT COMPLICATION, WITHOUT LONG-TERM CURRENT USE OF INSULIN (HCC): ICD-10-CM

## 2019-09-27 DIAGNOSIS — I10 ESSENTIAL HYPERTENSION: Primary | Chronic | ICD-10-CM

## 2019-09-27 DIAGNOSIS — J44.9 CHRONIC OBSTRUCTIVE PULMONARY DISEASE, UNSPECIFIED COPD TYPE (HCC): ICD-10-CM

## 2019-09-27 PROCEDURE — 99214 OFFICE O/P EST MOD 30 MIN: CPT | Performed by: NURSE PRACTITIONER

## 2019-09-27 PROCEDURE — 3017F COLORECTAL CA SCREEN DOC REV: CPT | Performed by: NURSE PRACTITIONER

## 2019-09-27 PROCEDURE — G8926 SPIRO NO PERF OR DOC: HCPCS | Performed by: NURSE PRACTITIONER

## 2019-09-27 PROCEDURE — G8417 CALC BMI ABV UP PARAM F/U: HCPCS | Performed by: NURSE PRACTITIONER

## 2019-09-27 PROCEDURE — 2022F DILAT RTA XM EVC RTNOPTHY: CPT | Performed by: NURSE PRACTITIONER

## 2019-09-27 PROCEDURE — 3045F PR MOST RECENT HEMOGLOBIN A1C LEVEL 7.0-9.0%: CPT | Performed by: NURSE PRACTITIONER

## 2019-09-27 PROCEDURE — 1123F ACP DISCUSS/DSCN MKR DOCD: CPT | Performed by: NURSE PRACTITIONER

## 2019-09-27 PROCEDURE — 1036F TOBACCO NON-USER: CPT | Performed by: NURSE PRACTITIONER

## 2019-09-27 PROCEDURE — 3023F SPIROM DOC REV: CPT | Performed by: NURSE PRACTITIONER

## 2019-09-27 PROCEDURE — 4040F PNEUMOC VAC/ADMIN/RCVD: CPT | Performed by: NURSE PRACTITIONER

## 2019-09-27 PROCEDURE — G0008 ADMIN INFLUENZA VIRUS VAC: HCPCS | Performed by: NURSE PRACTITIONER

## 2019-09-27 PROCEDURE — G8427 DOCREV CUR MEDS BY ELIG CLIN: HCPCS | Performed by: NURSE PRACTITIONER

## 2019-09-27 PROCEDURE — G8598 ASA/ANTIPLAT THER USED: HCPCS | Performed by: NURSE PRACTITIONER

## 2019-09-27 PROCEDURE — 90653 IIV ADJUVANT VACCINE IM: CPT | Performed by: NURSE PRACTITIONER

## 2019-09-27 RX ORDER — NITROGLYCERIN 0.4 MG/1
TABLET SUBLINGUAL
Refills: 1 | COMMUNITY
Start: 2019-07-05 | End: 2019-09-27

## 2019-09-27 ASSESSMENT — ENCOUNTER SYMPTOMS
TROUBLE SWALLOWING: 0
COUGH: 0
EYE PAIN: 0
ABDOMINAL PAIN: 0
CHEST TIGHTNESS: 0
EYE DISCHARGE: 0
DIARRHEA: 0
SHORTNESS OF BREATH: 1
CONSTIPATION: 1

## 2019-09-27 NOTE — PROGRESS NOTES
Constitutional: Negative for fatigue. HENT: Positive for hearing loss and tinnitus. Negative for congestion, ear pain and trouble swallowing. Eyes: Negative for pain, discharge and visual disturbance. Respiratory: Positive for shortness of breath. Negative for cough and chest tightness. Cardiovascular: Negative for chest pain, palpitations and leg swelling. CAD-sees Dr Julio Reyes, hyperlipdemia   Gastrointestinal: Positive for constipation. Negative for abdominal pain and diarrhea. Endocrine:        Diabetic   Genitourinary: Negative for difficulty urinating, discharge and testicular pain. Musculoskeletal: Negative for arthralgias. Skin: Negative for rash. Neurological: Positive for tremors. Negative for dizziness, weakness, numbness and headaches. Hematological: Does not bruise/bleed easily. Psychiatric/Behavioral: Positive for dysphoric mood. The patient is nervous/anxious. Vitals:    09/27/19 1309   BP: 132/70   Site: Left Upper Arm   Position: Sitting   Pulse: 87   Temp: 97.9 °F (36.6 °C)   TempSrc: Temporal   SpO2: 96%   Weight: 200 lb 9.6 oz (91 kg)   Height: 5' 8\" (1.727 m)     Estimated body mass index is 30.5 kg/m² as calculated from the following:    Height as of this encounter: 5' 8\" (1.727 m). Weight as of this encounter: 200 lb 9.6 oz (91 kg). PHYSICAL EXAM:    VS:  Blood pressure 132/70, pulse 87, temperature 97.9 °F (36.6 °C), temperature source Temporal, height 5' 8\" (1.727 m), weight 200 lb 9.6 oz (91 kg), SpO2 96 %. Physical Exam   Constitutional: He is oriented to person, place, and time. He appears well-developed and well-nourished. No distress. HENT:   Head: Normocephalic. Right Ear: Hearing, tympanic membrane, external ear and ear canal normal.   Left Ear: Hearing, tympanic membrane, external ear and ear canal normal.   Nose: Nose normal. No mucosal edema or rhinorrhea.    Mouth/Throat: Oropharynx is clear and moist and mucous membranes are

## 2019-10-29 ENCOUNTER — OFFICE VISIT (OUTPATIENT)
Dept: FAMILY MEDICINE CLINIC | Age: 74
End: 2019-10-29
Payer: MEDICARE

## 2019-10-29 VITALS
HEART RATE: 78 BPM | WEIGHT: 196 LBS | TEMPERATURE: 98.3 F | BODY MASS INDEX: 29.8 KG/M2 | OXYGEN SATURATION: 98 % | SYSTOLIC BLOOD PRESSURE: 130 MMHG | DIASTOLIC BLOOD PRESSURE: 68 MMHG

## 2019-10-29 DIAGNOSIS — T81.9XXS: ICD-10-CM

## 2019-10-29 DIAGNOSIS — I10 ESSENTIAL HYPERTENSION: Chronic | ICD-10-CM

## 2019-10-29 DIAGNOSIS — F33.41 RECURRENT MAJOR DEPRESSIVE DISORDER, IN PARTIAL REMISSION (HCC): ICD-10-CM

## 2019-10-29 DIAGNOSIS — E11.9 TYPE 2 DIABETES MELLITUS WITHOUT COMPLICATION, WITHOUT LONG-TERM CURRENT USE OF INSULIN (HCC): Primary | ICD-10-CM

## 2019-10-29 PROBLEM — S22.22XA CLOSED FRACTURE OF BODY OF STERNUM: Status: ACTIVE | Noted: 2019-09-26

## 2019-10-29 PROBLEM — T81.32XA STERNAL WOUND DEHISCENCE: Status: ACTIVE | Noted: 2019-09-26

## 2019-10-29 PROBLEM — T81.328A STERNAL WOUND DEHISCENCE: Status: ACTIVE | Noted: 2019-09-26

## 2019-10-29 PROCEDURE — 1036F TOBACCO NON-USER: CPT | Performed by: NURSE PRACTITIONER

## 2019-10-29 PROCEDURE — G8598 ASA/ANTIPLAT THER USED: HCPCS | Performed by: NURSE PRACTITIONER

## 2019-10-29 PROCEDURE — G8427 DOCREV CUR MEDS BY ELIG CLIN: HCPCS | Performed by: NURSE PRACTITIONER

## 2019-10-29 PROCEDURE — G8417 CALC BMI ABV UP PARAM F/U: HCPCS | Performed by: NURSE PRACTITIONER

## 2019-10-29 PROCEDURE — 1123F ACP DISCUSS/DSCN MKR DOCD: CPT | Performed by: NURSE PRACTITIONER

## 2019-10-29 PROCEDURE — 99214 OFFICE O/P EST MOD 30 MIN: CPT | Performed by: NURSE PRACTITIONER

## 2019-10-29 PROCEDURE — 2022F DILAT RTA XM EVC RTNOPTHY: CPT | Performed by: NURSE PRACTITIONER

## 2019-10-29 PROCEDURE — 3017F COLORECTAL CA SCREEN DOC REV: CPT | Performed by: NURSE PRACTITIONER

## 2019-10-29 PROCEDURE — G8482 FLU IMMUNIZE ORDER/ADMIN: HCPCS | Performed by: NURSE PRACTITIONER

## 2019-10-29 PROCEDURE — 4040F PNEUMOC VAC/ADMIN/RCVD: CPT | Performed by: NURSE PRACTITIONER

## 2019-10-29 RX ORDER — SENNA AND DOCUSATE SODIUM 50; 8.6 MG/1; MG/1
1 TABLET, FILM COATED ORAL 2 TIMES DAILY
COMMUNITY

## 2019-10-29 RX ORDER — OXYCODONE HYDROCHLORIDE 15 MG/1
5 TABLET ORAL EVERY 6 HOURS PRN
COMMUNITY

## 2019-10-29 RX ORDER — POLYETHYLENE GLYCOL 3350 17 G/17G
17 POWDER, FOR SOLUTION ORAL DAILY PRN
COMMUNITY

## 2019-10-29 ASSESSMENT — ENCOUNTER SYMPTOMS
CONSTIPATION: 1
EYE DISCHARGE: 0
CHEST TIGHTNESS: 0
EYE PAIN: 0
SHORTNESS OF BREATH: 1
ABDOMINAL PAIN: 0
TROUBLE SWALLOWING: 0
DIARRHEA: 0
COUGH: 0

## 2019-11-18 ENCOUNTER — TELEPHONE (OUTPATIENT)
Dept: CARDIOTHORACIC SURGERY | Age: 74
End: 2019-11-18

## 2020-02-19 RX ORDER — LISINOPRIL 40 MG/1
40 TABLET ORAL DAILY
Qty: 90 TABLET | Refills: 2 | Status: SHIPPED | OUTPATIENT
Start: 2020-02-19

## 2020-03-02 ENCOUNTER — TELEPHONE (OUTPATIENT)
Dept: FAMILY MEDICINE CLINIC | Age: 75
End: 2020-03-02

## 2020-03-16 ENCOUNTER — HOSPITAL ENCOUNTER (OUTPATIENT)
Age: 75
Discharge: HOME | End: 2020-03-18
Payer: MEDICARE

## 2020-03-16 LAB
ALBUMIN SERPL-MCNC: 4.2 G/DL (ref 3.5–5.2)
ALP BLD-CCNC: 98 U/L (ref 40–129)
ALT SERPL-CCNC: 12 U/L (ref 0–40)
ANION GAP SERPL CALCULATED.3IONS-SCNC: 14 MMOL/L (ref 7–16)
AST SERPL-CCNC: 16 U/L (ref 0–39)
BASOPHILS ABSOLUTE: 0.08 E9/L (ref 0–0.2)
BASOPHILS RELATIVE PERCENT: 1.2 % (ref 0–2)
BILIRUB SERPL-MCNC: 0.5 MG/DL (ref 0–1.2)
BUN BLDV-MCNC: 14 MG/DL (ref 8–23)
CALCIUM SERPL-MCNC: 9.6 MG/DL (ref 8.6–10.2)
CHLORIDE BLD-SCNC: 99 MMOL/L (ref 98–107)
CHOLESTEROL, TOTAL: 118 MG/DL (ref 0–199)
CO2: 23 MMOL/L (ref 22–29)
CREAT SERPL-MCNC: 0.9 MG/DL (ref 0.7–1.2)
EOSINOPHILS ABSOLUTE: 0.19 E9/L (ref 0.05–0.5)
EOSINOPHILS RELATIVE PERCENT: 2.9 % (ref 0–6)
GFR AFRICAN AMERICAN: >60
GFR NON-AFRICAN AMERICAN: >60 ML/MIN/1.73
GLUCOSE BLD-MCNC: 172 MG/DL (ref 74–99)
HBA1C MFR BLD: 7.5 % (ref 4–5.6)
HCT VFR BLD CALC: 45.4 % (ref 37–54)
HDLC SERPL-MCNC: 59 MG/DL
HEMOGLOBIN: 14.7 G/DL (ref 12.5–16.5)
IMMATURE GRANULOCYTES #: 0.01 E9/L
IMMATURE GRANULOCYTES %: 0.2 % (ref 0–5)
LDL CHOLESTEROL CALCULATED: 38 MG/DL (ref 0–99)
LYMPHOCYTES ABSOLUTE: 1.8 E9/L (ref 1.5–4)
LYMPHOCYTES RELATIVE PERCENT: 27.1 % (ref 20–42)
MCH RBC QN AUTO: 30.8 PG (ref 26–35)
MCHC RBC AUTO-ENTMCNC: 32.4 % (ref 32–34.5)
MCV RBC AUTO: 95 FL (ref 80–99.9)
MONOCYTES ABSOLUTE: 0.7 E9/L (ref 0.1–0.95)
MONOCYTES RELATIVE PERCENT: 10.5 % (ref 2–12)
NEUTROPHILS ABSOLUTE: 3.87 E9/L (ref 1.8–7.3)
NEUTROPHILS RELATIVE PERCENT: 58.1 % (ref 43–80)
PDW BLD-RTO: 13.9 FL (ref 11.5–15)
PLATELET # BLD: 294 E9/L (ref 130–450)
PMV BLD AUTO: 10.7 FL (ref 7–12)
POTASSIUM SERPL-SCNC: 4 MMOL/L (ref 3.5–5)
PROSTATE SPECIFIC ANTIGEN: 1.1 NG/ML (ref 0–4)
RBC # BLD: 4.78 E12/L (ref 3.8–5.8)
SODIUM BLD-SCNC: 136 MMOL/L (ref 132–146)
TOTAL PROTEIN: 7.2 G/DL (ref 6.4–8.3)
TRIGL SERPL-MCNC: 104 MG/DL (ref 0–149)
VITAMIN D 25-HYDROXY: 35 NG/ML (ref 30–100)
VLDLC SERPL CALC-MCNC: 21 MG/DL
WBC # BLD: 6.7 E9/L (ref 4.5–11.5)

## 2020-03-16 PROCEDURE — 85025 COMPLETE CBC W/AUTO DIFF WBC: CPT

## 2020-03-16 PROCEDURE — G0103 PSA SCREENING: HCPCS

## 2020-03-16 PROCEDURE — 80061 LIPID PANEL: CPT

## 2020-03-16 PROCEDURE — 36415 COLL VENOUS BLD VENIPUNCTURE: CPT

## 2020-03-16 PROCEDURE — 83036 HEMOGLOBIN GLYCOSYLATED A1C: CPT

## 2020-03-16 PROCEDURE — 82306 VITAMIN D 25 HYDROXY: CPT

## 2020-03-16 PROCEDURE — 80053 COMPREHEN METABOLIC PANEL: CPT

## 2020-04-10 ENCOUNTER — TELEPHONE (OUTPATIENT)
Dept: FAMILY MEDICINE CLINIC | Age: 75
End: 2020-04-10

## 2020-04-28 ENCOUNTER — TELEPHONE (OUTPATIENT)
Dept: PRIMARY CARE CLINIC | Age: 75
End: 2020-04-28

## 2020-04-28 NOTE — TELEPHONE ENCOUNTER
Pt does not have a smart phone or any other device to do a vv. He is going to call his daughter to set up a time that she will be able to be with him to use her phone.

## 2020-05-08 ENCOUNTER — VIRTUAL VISIT (OUTPATIENT)
Dept: PRIMARY CARE CLINIC | Age: 75
End: 2020-05-08
Payer: MEDICARE

## 2020-05-08 VITALS — WEIGHT: 200 LBS | HEIGHT: 68 IN | BODY MASS INDEX: 30.31 KG/M2

## 2020-05-08 PROBLEM — T81.328A STERNAL WOUND DEHISCENCE: Status: RESOLVED | Noted: 2019-09-26 | Resolved: 2020-05-08

## 2020-05-08 PROBLEM — T81.32XA STERNAL WOUND DEHISCENCE: Status: RESOLVED | Noted: 2019-09-26 | Resolved: 2020-05-08

## 2020-05-08 PROCEDURE — G2012 BRIEF CHECK IN BY MD/QHP: HCPCS | Performed by: NURSE PRACTITIONER

## 2020-05-08 RX ORDER — ATORVASTATIN CALCIUM 40 MG/1
40 TABLET, FILM COATED ORAL EVERY EVENING
Qty: 90 TABLET | Refills: 3 | Status: SHIPPED | OUTPATIENT
Start: 2020-05-08

## 2020-05-08 RX ORDER — TAMSULOSIN HYDROCHLORIDE 0.4 MG/1
0.4 CAPSULE ORAL DAILY
Qty: 90 CAPSULE | Refills: 3 | Status: SHIPPED | OUTPATIENT
Start: 2020-05-08

## 2020-05-08 ASSESSMENT — ENCOUNTER SYMPTOMS
SHORTNESS OF BREATH: 1
CONSTIPATION: 1
ABDOMINAL PAIN: 0
COUGH: 0
TROUBLE SWALLOWING: 0
EYE DISCHARGE: 0
CHEST TIGHTNESS: 0
DIARRHEA: 0
EYE PAIN: 0

## 2020-07-27 RX ORDER — FLUDROCORTISONE ACETATE 0.1 MG/1
0.1 TABLET ORAL DAILY
Qty: 90 TABLET | Refills: 0 | Status: SHIPPED | OUTPATIENT
Start: 2020-07-27

## 2020-07-27 NOTE — TELEPHONE ENCOUNTER
Last Appointment:  10/29/2019  No future appointments. This is a mail away pharmacy/unable to do a 30 day supply.

## 2020-08-03 NOTE — TELEPHONE ENCOUNTER
Spoke with Falguni Alston, he does plan to continue seeing Dr Mao Trujillo for his care.  I advised him he must schedule and keep an appt in order to receive additional refills

## 2020-08-06 ENCOUNTER — TELEPHONE (OUTPATIENT)
Dept: PHARMACY | Facility: CLINIC | Age: 75
End: 2020-08-06

## 2020-11-03 PROBLEM — I25.10 CAD (CORONARY ARTERY DISEASE): Status: RESOLVED | Noted: 2020-11-03 | Resolved: 2020-11-03

## 2020-11-06 ENCOUNTER — TELEPHONE (OUTPATIENT)
Dept: PHARMACY | Facility: CLINIC | Age: 75
End: 2020-11-06

## 2020-11-06 NOTE — TELEPHONE ENCOUNTER
CLINICAL PHARMACY CONSULT: MED RECONCILIATION/REVIEW ADDENDUM    For Pharmacy Admin Tracking Only    PHSO: Yes  Total # of Interventions Recommended: 1  - New Order #: 1 New Medication Order Reason(s):  Adherence  - Maintenance Safety Lab Monitoring #: 1  - New Therapy Lab Monitoring #: 1  Recommended intervention potential cost savings: 1  Total Interventions Accepted: 1  Time Spent (min): [address], [address] [facility]

## 2020-11-18 ENCOUNTER — TELEPHONE (OUTPATIENT)
Dept: PHARMACY | Facility: CLINIC | Age: 75
End: 2020-11-18

## 2020-11-18 NOTE — TELEPHONE ENCOUNTER
CLINICAL PHARMACY: ADHERENCE REVIEW  Identified care gap per Hernando; fills at [facility]: ACE/ARB adherence    Last Office Visit: 5/28/20    Patient also appears to be taking: Atorvastatin 40mg tab + Metformin 1000mg tab      ASSESSMENT  ACE/ARB ADHERENCE  PDC: n/a  Potential Fail Date: 11/24/20    Per Jose Ibrahim at [facility]:  Prescription available for refill for 90 d/s. Patient needs to call to initiate refill. Per [employer] Records   LISINOPRIL TAB 40MG last filled on 7/26/20 for a 90 day supply;     BP Readings from Last 3 Encounters:   10/29/19 130/68   09/27/19 132/70   09/10/19 132/80     CrCl cannot be calculated (Patient's most recent lab result is older than the maximum 120 days allowed. ). PLAN    Spoke to patient who stated that he would call [facility] to place initiate fill and get medication shipped out. No further action needed.

## 2020-11-18 NOTE — TELEPHONE ENCOUNTER
CLINICAL PHARMACY CONSULT: MED RECONCILIATION/REVIEW ADDENDUM    For Pharmacy Admin Tracking Only    PHSO: Yes  Total # of Interventions Recommended: 1  - New Order #: 1 New Medication Order Reason(s):  Adherence  - Maintenance Safety Lab Monitoring #: 1  - New Therapy Lab Monitoring #: 1  Recommended intervention potential cost savings: 1  Total Interventions Accepted: 1  Time Spent (min): [address], [address] [facility]

## 2020-11-24 ENCOUNTER — TELEPHONE (OUTPATIENT)
Dept: PHARMACY | Facility: CLINIC | Age: 75
End: 2020-11-24

## 2020-11-24 NOTE — TELEPHONE ENCOUNTER
CLINICAL PHARMACY CONSULT: MED RECONCILIATION/REVIEW ADDENDUM    For Pharmacy Admin Tracking Only    PHSO: Yes  Total # of Interventions Recommended: 1  - New Order #: 1 New Medication Order Reason(s):  Adherence  - Maintenance Safety Lab Monitoring #: 1  - New Therapy Lab Monitoring #: 1  Recommended intervention potential cost savings: 1  Total Interventions Accepted: 1  Time Spent (min): [address], [address] [facility]

## 2020-11-24 NOTE — TELEPHONE ENCOUNTER
CLINICAL PHARMACY: ADHERENCE REVIEW  Identified care gap per Hernando; fills at [facility]: Statin adherence    Last Office Visit: 5/8/20      ASSESSMENT  ACE/ARB ADHERENCE  PDC: n/a    Per Insurance Records   LISINOPRIL TAB 40MG last filled on 7/26/20 for a 90 day supply; Per last encounter on 11/18/20, patient would quentin call pharmacy to have medication ship out. BP Readings from Last 3 Encounters:   10/29/19 130/68   09/27/19 132/70   09/10/19 132/80     CrCl cannot be calculated (Patient's most recent lab result is older than the maximum 120 days allowed. ). DIABETES ADHERENCE  PDC: 74%  FAIL    Per Insurance Records   METFORMIN TAB 1000MG last filled on 11/17/20 for a 30 day supply;     Lab Results   Component Value Date    LABA1C 7.5 (H) 03/16/2020    LABA1C 7.1 (H) 06/17/2019    LABA1C 6.3 (H) 08/29/2018       STATIN ADHERENCE  PDC: 91%  Potential Fail Date: 12/11/20    Per [facility] pharmacy:  Prescription due to to be filled and shipped out automatically on 12/4/20 for 90 d/s. Per Insurance Records   ATORVASTATIN TAB 40MG last filled on 7/26/20 for a 90 day supply;     Lab Results   Component Value Date    CHOL 118 03/16/2020    TRIG 104 03/16/2020    HDL 59 03/16/2020    LDLCALC 38 03/16/2020     ALT   Date Value Ref Range Status   03/16/2020 12 0 - 40 U/L Final     AST   Date Value Ref Range Status   03/16/2020 16 0 - 39 U/L Final     The ASCVD Risk score (Brandon Vega. et al., 2013) failed to calculate for the following reasons: The systolic blood pressure is missing    The valid total cholesterol range is 130 to 320 mg/dL     PLAN    Prescription due to to be filled and shipped out automatically on 12/4/20 for 90 d/s. No further action needed.

## 2020-12-08 ENCOUNTER — TELEPHONE (OUTPATIENT)
Dept: PHARMACY | Facility: CLINIC | Age: 75
End: 2020-12-08

## 2020-12-08 NOTE — TELEPHONE ENCOUNTER
CLINICAL PHARMACY: ADHERENCE REVIEW  Identified care gap per Hernando; fills at [facility]: ACE/ARB, Diabetes and Statin adherence    Last Office Visit: 2020 Virtual Visit    ASSESSMENT  ACE/ARB ADHERENCE  PDC: Patient calculated to pass this adherence measure for 2020. Per [employer] Records   Lisinopril 20 mg tablets last filled on 2020 for a 90 day supply at [facility]    BP Readings from Last 3 Encounters:   10/29/19 130/68   19 132/70   09/10/19 132/80     CrCl cannot be calculated (Patient's most recent lab result is older than the maximum 120 days allowed. ). DIABETES ADHERENCE  PDC: 75% Potential adherence fail date 2020    Per Insurance Records   Metformin 1000 mg tablets last filled on 2020 for a 30 day supply; SI tab BID with meals. 1 new script on file for 90 day supply with 1 refill, provider Divine Bueno. Billed through [facility]. Filling today at [facility]. Lab Results   Component Value Date    LABA1C 7.5 (H) 2020    LABA1C 7.1 (H) 2019    LABA1C 6.3 (H) 2018       STATIN ADHERENCE  PDC: 85%    Per Insurance Records   Atorvastatin 40 mg tablets last filled on 2020 for a 90 day supply    Lab Results   Component Value Date    CHOL 118 2020    TRIG 104 2020    HDL 59 2020    LDLCALC 38 2020     ALT   Date Value Ref Range Status   2020 12 0 - 40 U/L Final     AST   Date Value Ref Range Status   2020 16 0 - 39 U/L Final     The ASCVD Risk score (Estephanie Reyes et al., 2013) failed to calculate for the following reasons: The systolic blood pressure is missing    The valid total cholesterol range is 130 to 320 mg/dL     PLAN  Attempting to reach patient to review.  Left message asking for return call. No future appointments.     Maureen Angulo, PharmD, [address], [address]  [facility] Pharmacist  O: [phone]  Department, toll free: [phone], option 7

## 2020-12-09 NOTE — TELEPHONE ENCOUNTER
Elías Paniagua returned call. States he is still taking the metformin 1000 mg two times daily with meals, prescribed by Edu Combs now. Filled at [facility] in November, because mail order would not have to him in time, but filling with [facility] again this month for 90 day supply. Per [address], they do have a prescription for Metformin on file set to be refilled this week. No further outreach planned at this time. CLINICAL PHARMACY CONSULT: MED RECONCILIATION/REVIEW ADDENDUM    For Pharmacy Admin Tracking Only    PHSO: Yes  Total # of Interventions Recommended: 3  - New Order #: 0 New Medication Order Reason(s):  Adherence  - Refills Provided #: 0  - Maintenance Safety Lab Monitoring #: 1  - New Therapy Lab Monitoring #: 1  Recommended intervention potential cost savings: 1  Total Interventions Accepted: 0  Time Spent (min): 25    Marina Paul, PharmD  [address]

## 2021-04-09 NOTE — PLAN OF CARE
Problem: Falls - Risk of:  Goal: Will remain free from falls  Will remain free from falls   Outcome: Met This Shift No

## 2021-08-11 ENCOUNTER — TELEPHONE (OUTPATIENT)
Dept: OTHER | Facility: CLINIC | Age: 76
End: 2021-08-11

## 2021-08-11 NOTE — TELEPHONE ENCOUNTER
Call made to patient to offer [facility]  program to patient. Patient declined program at this time.

## 2021-09-07 ENCOUNTER — HOSPITAL ENCOUNTER (OUTPATIENT)
Age: 76
Discharge: HOME | End: 2021-09-09

## 2021-09-07 PROCEDURE — 88305 TISSUE EXAM BY PATHOLOGIST: CPT

## 2021-09-07 PROCEDURE — 88342 IMHCHEM/IMCYTCHM 1ST ANTB: CPT

## 2022-02-06 ENCOUNTER — TELEPHONE (OUTPATIENT)
Dept: PHARMACY | Facility: CLINIC | Age: 77
End: 2022-02-06

## 2022-02-06 NOTE — TELEPHONE ENCOUNTER
[facility] CLINICAL PHARMACY: STATIN THERAPY REVIEW    Last Office Visit: unknown - lack of 2021 data in EMR  Pharmacy: [facility], [facility]    No Known Allergies    ASSESSMENT  STATIN ADHERENCE    Per chart review, patient is prescribed atorvastatin 40m daily    Per Reconciled Dispense Report as of 02/06/2022:  atorvastatin last filled on 02/05/2022 for 90 day supply via [facility]    Lab Results   Component Value Date    CHOL 118 03/16/2020    TRIG 104 03/16/2020    HDL 59 03/16/2020    LDLCALC 38 03/16/2020     ALT   Date Value Ref Range Status   03/16/2020 12 0 - 40 U/L Final     AST   Date Value Ref Range Status   03/16/2020 16 0 - 39 U/L Final     The ASCVD Risk score (Ricky Qureshi, et al., 2013) failed to calculate for the following reasons: The systolic blood pressure is missing    The valid total cholesterol range is 130 to 320 mg/dL     2021 ADA Guidelines Age:    >/= 36years old: Moderate- to amaris-intensity statin is recommended based on history of ASCVD and/or 10-year ASCVD risk. PLAN  Per chart review, CMS SUPD measure states statin needs to be filled before the end of the month of the month before patient turns 76yo. Appears patient may have changed insurance given recent fill at [facility]. Filled statin in February, not January 2022. No future appointments.     Aleena Giraldo PharmD, BCACP, [address] [place] [address], toll free: [phone], option 1    =======================================================    For Pharmacy Admin Tracking Only   Time Spent (min): 10

## 2022-02-28 ENCOUNTER — HOSPITAL ENCOUNTER (EMERGENCY)
Age: 77
Discharge: HOME | End: 2022-02-28
Attending: STUDENT IN AN ORGANIZED HEALTH CARE EDUCATION/TRAINING PROGRAM
Payer: MEDICARE

## 2022-02-28 ENCOUNTER — APPOINTMENT (OUTPATIENT)
Dept: GENERAL RADIOLOGY | Age: 77
End: 2022-02-28
Payer: MEDICARE

## 2022-02-28 ENCOUNTER — APPOINTMENT (OUTPATIENT)
Dept: CT IMAGING | Age: 77
End: 2022-02-28
Payer: MEDICARE

## 2022-02-28 VITALS
BODY MASS INDEX: 31.52 KG/M2 | WEIGHT: 208 LBS | RESPIRATION RATE: 16 BRPM | DIASTOLIC BLOOD PRESSURE: 53 MMHG | SYSTOLIC BLOOD PRESSURE: 105 MMHG | OXYGEN SATURATION: 97 % | TEMPERATURE: 97.1 F | HEART RATE: 96 BPM | HEIGHT: 68 IN

## 2022-02-28 DIAGNOSIS — I95.1 ORTHOSTATIC HYPOTENSION: Primary | ICD-10-CM

## 2022-02-28 LAB
ANION GAP SERPL CALCULATED.3IONS-SCNC: 12 MMOL/L (ref 7–16)
BUN BLDV-MCNC: 16 MG/DL (ref 6–23)
CALCIUM SERPL-MCNC: 9.6 MG/DL (ref 8.6–10.2)
CHLORIDE BLD-SCNC: 103 MMOL/L (ref 98–107)
CO2: 23 MMOL/L (ref 22–29)
CREAT SERPL-MCNC: 1.6 MG/DL (ref 0.7–1.2)
GFR AFRICAN AMERICAN: 51
GFR NON-AFRICAN AMERICAN: 42 ML/MIN/1.73
GLUCOSE BLD-MCNC: 137 MG/DL (ref 74–99)
HCT VFR BLD CALC: 45.5 % (ref 37–54)
HEMOGLOBIN: 14.9 G/DL (ref 12.5–16.5)
MCH RBC QN AUTO: 32.3 PG (ref 26–35)
MCHC RBC AUTO-ENTMCNC: 32.7 % (ref 32–34.5)
MCV RBC AUTO: 98.5 FL (ref 80–99.9)
PDW BLD-RTO: 13.1 FL (ref 11.5–15)
PLATELET # BLD: 262 E9/L (ref 130–450)
PMV BLD AUTO: 9.9 FL (ref 7–12)
POTASSIUM SERPL-SCNC: 4.8 MMOL/L (ref 3.5–5)
RBC # BLD: 4.62 E12/L (ref 3.8–5.8)
SODIUM BLD-SCNC: 138 MMOL/L (ref 132–146)
TROPONIN, HIGH SENSITIVITY: 28 NG/L (ref 0–11)
TROPONIN, HIGH SENSITIVITY: 37 NG/L (ref 0–11)
WBC # BLD: 8 E9/L (ref 4.5–11.5)

## 2022-02-28 PROCEDURE — 99283 EMERGENCY DEPT VISIT LOW MDM: CPT

## 2022-02-28 PROCEDURE — 84484 ASSAY OF TROPONIN QUANT: CPT

## 2022-02-28 PROCEDURE — 71045 X-RAY EXAM CHEST 1 VIEW: CPT

## 2022-02-28 PROCEDURE — 36415 COLL VENOUS BLD VENIPUNCTURE: CPT

## 2022-02-28 PROCEDURE — 80048 BASIC METABOLIC PNL TOTAL CA: CPT

## 2022-02-28 PROCEDURE — 70450 CT HEAD/BRAIN W/O DYE: CPT

## 2022-02-28 PROCEDURE — 85027 COMPLETE CBC AUTOMATED: CPT

## 2022-02-28 PROCEDURE — 2580000003 HC RX 258: Performed by: STUDENT IN AN ORGANIZED HEALTH CARE EDUCATION/TRAINING PROGRAM

## 2022-02-28 PROCEDURE — 93005 ELECTROCARDIOGRAM TRACING: CPT | Performed by: PHYSICIAN ASSISTANT

## 2022-02-28 RX ORDER — 0.9 % SODIUM CHLORIDE 0.9 %
1000 INTRAVENOUS SOLUTION INTRAVENOUS ONCE
Status: COMPLETED | OUTPATIENT
Start: 2022-02-28 | End: 2022-02-28

## 2022-02-28 RX ADMIN — SODIUM CHLORIDE 1000 ML: 9 INJECTION, SOLUTION INTRAVENOUS at 18:37

## 2022-02-28 ASSESSMENT — ENCOUNTER SYMPTOMS
NAUSEA: 0
SORE THROAT: 0
SHORTNESS OF BREATH: 0
DIARRHEA: 0
COUGH: 0
BACK PAIN: 0
PHOTOPHOBIA: 0

## 2022-02-28 NOTE — ED PROVIDER NOTES
46-year-old male presenting for hypotension, dizziness. Symptoms have been mild and intermittent, worsened with ambulation, relieved with rest.  Patient states he was walking when he suddenly felt lightheaded. He did not pass out or fall. He states he felt hunger pains and sat down and ate and felt better afterwards. He states at home, his blood pressure was in the 26B systolic. He complains of very mild headache. He states that he has recently been having his blood pressure medications adjusted. He has not had any recent fevers, chills, chest pain, shortness of breath, nausea, emesis, or diarrhea. Review of Systems   Constitutional: Negative for chills and fever. HENT: Negative for congestion and sore throat. Eyes: Negative for photophobia and visual disturbance. Respiratory: Negative for cough and shortness of breath. Cardiovascular: Negative for chest pain. Gastrointestinal: Negative for diarrhea and nausea. Genitourinary: Negative for dysuria and flank pain. Musculoskeletal: Negative for back pain and myalgias. Skin: Negative for rash and wound. Neurological: Positive for light-headedness and headaches. Negative for syncope, weakness and numbness. All other systems reviewed and are negative. Physical Exam  Constitutional:       General: He is not in acute distress. Appearance: He is not ill-appearing. HENT:      Head: Normocephalic and atraumatic. Right Ear: External ear normal.      Left Ear: External ear normal.      Nose: Nose normal.   Eyes:      Conjunctiva/sclera: Conjunctivae normal.   Cardiovascular:      Rate and Rhythm: Normal rate and regular rhythm. Pulmonary:      Effort: Pulmonary effort is normal.      Breath sounds: Normal breath sounds. Abdominal:      General: There is no distension. Palpations: Abdomen is soft. Tenderness: There is no abdominal tenderness. Musculoskeletal:         General: No swelling or deformity. Skin:     General: Skin is warm and dry. Neurological:      General: No focal deficit present. Mental Status: He is alert. Psychiatric:         Mood and Affect: Mood normal.         Behavior: Behavior normal.          Procedures     MDM  Number of Diagnoses or Management Options  Orthostatic hypotension  Diagnosis management comments: 50-year-old male presenting with lightheadedness with ambulation. Orthostatic positive. Labs and EKG unremarkable. All imaging unremarkable. Patient was given fluids with relief. He was able to ambulate without lightheadedness. Creatinine was noted to be elevated at 1.6 compared to previously documented value in 2020 of 0.9. At this time, he is felt stable for discharge with outpatient follow-up. He was informed of uptrending creatinine and the need for further evaluation of this, and expressed understanding. He was discharged home in stable condition with instructions to follow-up with PCP                         --------------------------------------------- PAST HISTORY ---------------------------------------------  Past Medical History:  has a past medical history of Acute blood loss anemia, BPH (benign prostatic hyperplasia), CAD (coronary artery disease), Constipation, [facility]) [facility] Diabetes mellitus ([facility] Hyperlipidemia, Hypotension, Respiratory insufficiency, and Severe protein-calorie malnutrition ([facility] Past Surgical History:  has a past surgical history that includes Colonoscopy; Cholecystectomy; Appendectomy; Tonsillectomy; Knee arthroscopy (Right); pr cabg, arterial, four+ (N/A, 5/8/2018); pr cabg, arterial, four+ (N/A, 5/9/2018); pr [facility] incl fluor gdnce dx w/cell washg spx (N/A, 5/8/2018); Cardiac catheterization (04/26/2018); and sternum separation repair (10/2019). Social History:  reports that he has never smoked.  He has never used smokeless tobacco. He reports that he does not drink alcohol and does not use drugs. Family History: family history includes Breast Cancer in his mother; Diabetes in his father; Early Death in his father; High Blood Pressure in his brother; Kylee [place] in his brother. The patients home medications have been reviewed. Allergies: Patient has no known allergies. -------------------------------------------------- RESULTS -------------------------------------------------  Labs:  Results for orders placed or performed during the hospital encounter of 70/62/25   Basic metabolic panel   Result Value Ref Range    Sodium 138 132 - 146 mmol/L    Potassium 4.8 3.5 - 5.0 mmol/L    Chloride 103 98 - 107 mmol/L    CO2 23 22 - 29 mmol/L    Anion Gap 12 7 - 16 mmol/L    Glucose 137 (H) 74 - 99 mg/dL    BUN 16 6 - 23 mg/dL    CREATININE 1.6 (H) 0.7 - 1.2 mg/dL    GFR Non-African American 42 >=60 mL/min/1.73    GFR African American 51     Calcium 9.6 8.6 - 10.2 mg/dL   CBC   Result Value Ref Range    WBC 8.0 4.5 - 11.5 E9/L    RBC 4.62 3.80 - 5.80 E12/L    Hemoglobin 14.9 12.5 - 16.5 g/dL    Hematocrit 45.5 37.0 - 54.0 %    MCV 98.5 80.0 - 99.9 fL    MCH 32.3 26.0 - 35.0 pg    MCHC 32.7 32.0 - 34.5 %    RDW 13.1 11.5 - 15.0 fL    Platelets 017 158 - 627 E9/L    MPV 9.9 7.0 - 12.0 fL   Troponin I   Result Value Ref Range    Troponin, High Sensitivity 37 (H) 0 - 11 ng/L   Troponin   Result Value Ref Range    Troponin, High Sensitivity 28 (H) 0 - 11 ng/L   EKG 12 Lead   Result Value Ref Range    Ventricular Rate 79 BPM    Atrial Rate 79 BPM    P-R Interval 220 ms    QRS Duration 96 ms    Q-T Interval 392 ms    QTc Calculation (Bazett) 449 ms    P Axis 37 degrees    R Axis 13 degrees    T Axis 64 degrees       Radiology:  XR CHEST PORTABLE   Final Result   1. Slightly improved but persistent opacities in the left mid to lower lung. Blunting of the left costophrenic angle. 2.  Right lung is clear. Atherosclerotic disease and mild cardiomegaly. Normal pulmonary vascularity. RECOMMENDATION:   Recommend routine screening chest CT for further evaluation of the residual   opacities in the left mid to lower lung. CT HEAD WO CONTRAST   Final Result   No acute intracranial abnormality. Specifically, there is no acute   intracranial hemorrhage           EKG: interpreted by me  Rate: 79   Rhythm: Sinus   Interpretation: T waves inverted in V2; no acute ST elevations or depressions, SD interval 220  Comparison: Compared to previous, first-degree AV block now present    ------------------------- NURSING NOTES AND VITALS REVIEWED ---------------------------  Date / Time Roomed:  2/28/2022  4:01 PM  ED Bed Assignment:  [id number]    The nursing notes within the ED encounter and vital signs as below have been reviewed. BP (!) 105/53   Pulse 96   Temp 97.1 °F (36.2 °C) (Temporal)   Resp 16   Ht 5' 8\" (1.727 m)   Wt 208 lb (94.3 kg)   SpO2 97%   BMI 31.63 kg/m²   Oxygen Saturation Interpretation: Normal      ------------------------------------------ PROGRESS NOTES ------------------------------------------  I have spoken with the patient and discussed todays results, in addition to providing specific details for the plan of care and counseling regarding the diagnosis and prognosis. Their questions are answered at this time and they are agreeable with the plan. I discussed at length with them reasons for immediate return here for re evaluation. They will followup with their primary care physician by calling their office tomorrow. --------------------------------- ADDITIONAL PROVIDER NOTES ---------------------------------  At this time the patient is without objective evidence of an acute process requiring hospitalization or inpatient management. They have remained hemodynamically stable throughout their entire ED visit and are stable for discharge with outpatient follow-up.      The plan has been discussed in detail and they are aware of the specific conditions for emergent return, as well as the importance of follow-up. Discharge Medication List as of 2/28/2022  8:59 PM          Diagnosis:  1. Orthostatic hypotension        Disposition:  Patient's disposition: Discharge to home  Patient's condition is stable.        Asad Santos MD  Resident  02/28/22 7889

## 2022-03-01 LAB
EKG ATRIAL RATE: 79 BPM
EKG P AXIS: 37 DEGREES
EKG P-R INTERVAL: 220 MS
EKG Q-T INTERVAL: 392 MS
EKG QRS DURATION: 96 MS
EKG QTC CALCULATION (BAZETT): 449 MS
EKG R AXIS: 13 DEGREES
EKG T AXIS: 64 DEGREES
EKG VENTRICULAR RATE: 79 BPM

## 2022-03-01 PROCEDURE — 93010 ELECTROCARDIOGRAM REPORT: CPT | Performed by: INTERNAL MEDICINE

## 2022-03-01 NOTE — ED NOTES
Offer o complete ortho 2nd checked with dr Sharyn Oneal. She was assessing patient. He denied dizziness.  Per dr Sharyn Oneal discontinue ortho pressure     Donna Pagan, RN  02/28/22 2125

## 2022-04-19 ENCOUNTER — HOSPITAL ENCOUNTER (OUTPATIENT)
Dept: CT IMAGING | Age: 77
Discharge: HOME | End: 2022-04-21
Payer: COMMERCIAL

## 2022-04-19 DIAGNOSIS — J98.4 OTHER DISORDERS OF LUNG: ICD-10-CM

## 2022-04-19 PROCEDURE — 71250 CT THORAX DX C-: CPT

## 2022-09-15 ENCOUNTER — HOSPITAL ENCOUNTER (EMERGENCY)
Age: 77
Discharge: HOME | End: 2022-09-15
Attending: STUDENT IN AN ORGANIZED HEALTH CARE EDUCATION/TRAINING PROGRAM
Payer: MEDICARE

## 2022-09-15 ENCOUNTER — APPOINTMENT (OUTPATIENT)
Dept: GENERAL RADIOLOGY | Age: 77
End: 2022-09-15
Payer: MEDICARE

## 2022-09-15 VITALS
HEART RATE: 70 BPM | HEIGHT: 68 IN | SYSTOLIC BLOOD PRESSURE: 138 MMHG | RESPIRATION RATE: 18 BRPM | BODY MASS INDEX: 31.22 KG/M2 | OXYGEN SATURATION: 94 % | TEMPERATURE: 98.4 F | DIASTOLIC BLOOD PRESSURE: 66 MMHG | WEIGHT: 206 LBS

## 2022-09-15 DIAGNOSIS — U07.1 COVID-19: Primary | ICD-10-CM

## 2022-09-15 LAB
ALBUMIN SERPL-MCNC: 3.7 G/DL (ref 3.5–5.2)
ALP BLD-CCNC: 64 U/L (ref 40–129)
ALT SERPL-CCNC: 16 U/L (ref 0–40)
ANION GAP SERPL CALCULATED.3IONS-SCNC: 12 MMOL/L (ref 7–16)
AST SERPL-CCNC: 17 U/L (ref 0–39)
BASOPHILS ABSOLUTE: 0.05 E9/L (ref 0–0.2)
BASOPHILS RELATIVE PERCENT: 0.6 % (ref 0–2)
BILIRUB SERPL-MCNC: 0.4 MG/DL (ref 0–1.2)
BUN BLDV-MCNC: 10 MG/DL (ref 6–23)
CALCIUM SERPL-MCNC: 8.9 MG/DL (ref 8.6–10.2)
CHLORIDE BLD-SCNC: 99 MMOL/L (ref 98–107)
CO2: 26 MMOL/L (ref 22–29)
CREAT SERPL-MCNC: 1 MG/DL (ref 0.7–1.2)
EOSINOPHILS ABSOLUTE: 0.06 E9/L (ref 0.05–0.5)
EOSINOPHILS RELATIVE PERCENT: 0.8 % (ref 0–6)
GFR AFRICAN AMERICAN: >60
GFR NON-AFRICAN AMERICAN: >60 ML/MIN/1.73
GLUCOSE BLD-MCNC: 212 MG/DL (ref 74–99)
HCT VFR BLD CALC: 41.3 % (ref 37–54)
HEMOGLOBIN: 13.9 G/DL (ref 12.5–16.5)
IMMATURE GRANULOCYTES #: 0.04 E9/L
IMMATURE GRANULOCYTES %: 0.5 % (ref 0–5)
LYMPHOCYTES ABSOLUTE: 0.91 E9/L (ref 1.5–4)
LYMPHOCYTES RELATIVE PERCENT: 11.7 % (ref 20–42)
MCH RBC QN AUTO: 32.9 PG (ref 26–35)
MCHC RBC AUTO-ENTMCNC: 33.7 % (ref 32–34.5)
MCV RBC AUTO: 97.6 FL (ref 80–99.9)
MONOCYTES ABSOLUTE: 1.22 E9/L (ref 0.1–0.95)
MONOCYTES RELATIVE PERCENT: 15.7 % (ref 2–12)
NEUTROPHILS ABSOLUTE: 5.51 E9/L (ref 1.8–7.3)
NEUTROPHILS RELATIVE PERCENT: 70.7 % (ref 43–80)
PDW BLD-RTO: 13.2 FL (ref 11.5–15)
PLATELET # BLD: 203 E9/L (ref 130–450)
PMV BLD AUTO: 10 FL (ref 7–12)
POTASSIUM REFLEX MAGNESIUM: 3.8 MMOL/L (ref 3.5–5)
RBC # BLD: 4.23 E12/L (ref 3.8–5.8)
SARS-COV-2, NAAT: DETECTED
SODIUM BLD-SCNC: 137 MMOL/L (ref 132–146)
TOTAL PROTEIN: 6.4 G/DL (ref 6.4–8.3)
TROPONIN, HIGH SENSITIVITY: 30 NG/L (ref 0–11)
TROPONIN, HIGH SENSITIVITY: 34 NG/L (ref 0–11)
WBC # BLD: 7.8 E9/L (ref 4.5–11.5)

## 2022-09-15 PROCEDURE — 80053 COMPREHEN METABOLIC PANEL: CPT

## 2022-09-15 PROCEDURE — 84484 ASSAY OF TROPONIN QUANT: CPT

## 2022-09-15 PROCEDURE — 71045 X-RAY EXAM CHEST 1 VIEW: CPT

## 2022-09-15 PROCEDURE — 99285 EMERGENCY DEPT VISIT HI MDM: CPT

## 2022-09-15 PROCEDURE — 85025 COMPLETE CBC W/AUTO DIFF WBC: CPT

## 2022-09-15 PROCEDURE — 87635 SARS-COV-2 COVID-19 AMP PRB: CPT

## 2022-09-15 PROCEDURE — 36415 COLL VENOUS BLD VENIPUNCTURE: CPT

## 2022-09-15 PROCEDURE — 93005 ELECTROCARDIOGRAM TRACING: CPT | Performed by: STUDENT IN AN ORGANIZED HEALTH CARE EDUCATION/TRAINING PROGRAM

## 2022-09-15 RX ORDER — ONDANSETRON 8 MG/1
8 TABLET, ORALLY DISINTEGRATING ORAL EVERY 8 HOURS PRN
Qty: 12 TABLET | Refills: 1 | Status: SHIPPED | OUTPATIENT
Start: 2022-09-15 | End: 2022-09-15

## 2022-09-15 RX ORDER — DEXTROMETHORPHAN HYDROBROMIDE, GUAIFENESIN 5; 100 MG/5ML; MG/5ML
20 LIQUID ORAL EVERY 4 HOURS PRN
Qty: 237 ML | Refills: 0 | Status: SHIPPED | OUTPATIENT
Start: 2022-09-15 | End: 2022-09-15

## 2022-09-15 RX ORDER — DEXTROMETHORPHAN HYDROBROMIDE, GUAIFENESIN 5; 100 MG/5ML; MG/5ML
20 LIQUID ORAL EVERY 4 HOURS PRN
Qty: 237 ML | Refills: 0 | Status: SHIPPED | OUTPATIENT
Start: 2022-09-15 | End: 2022-09-22

## 2022-09-15 RX ORDER — ALBUTEROL SULFATE 90 UG/1
AEROSOL, METERED RESPIRATORY (INHALATION)
COMMUNITY
Start: 2022-01-28

## 2022-09-15 RX ORDER — BENZONATATE 100 MG/1
100 CAPSULE ORAL 2 TIMES DAILY PRN
Qty: 20 CAPSULE | Refills: 0 | Status: SHIPPED | OUTPATIENT
Start: 2022-09-15 | End: 2022-09-15

## 2022-09-15 RX ORDER — ONDANSETRON 8 MG/1
8 TABLET, ORALLY DISINTEGRATING ORAL EVERY 8 HOURS PRN
Qty: 12 TABLET | Refills: 1 | Status: SHIPPED | OUTPATIENT
Start: 2022-09-15

## 2022-09-15 RX ORDER — OMEPRAZOLE 20 MG/1
CAPSULE, DELAYED RELEASE ORAL
COMMUNITY
Start: 2022-07-20

## 2022-09-15 RX ORDER — BENZONATATE 100 MG/1
100 CAPSULE ORAL 2 TIMES DAILY PRN
Qty: 20 CAPSULE | Refills: 0 | Status: SHIPPED | OUTPATIENT
Start: 2022-09-15 | End: 2022-09-22

## 2022-09-15 ASSESSMENT — ENCOUNTER SYMPTOMS
WHEEZING: 0
SORE THROAT: 0
COUGH: 1
EYE PAIN: 0
VOMITING: 0
NAUSEA: 0
ABDOMINAL PAIN: 0
EYE REDNESS: 0
SHORTNESS OF BREATH: 0
BACK PAIN: 0
EYE DISCHARGE: 0
SINUS PRESSURE: 0
DIARRHEA: 0

## 2022-09-15 ASSESSMENT — PAIN - FUNCTIONAL ASSESSMENT
PAIN_FUNCTIONAL_ASSESSMENT: NONE - DENIES PAIN
PAIN_FUNCTIONAL_ASSESSMENT: NONE - DENIES PAIN

## 2022-09-15 NOTE — Clinical Note
Waqar Puller was seen and treated in our emergency department on 9/15/2022. COVID19 virus is suspected. Per the CDC guidelines we recommend home isolation until the following conditions are all met:    1. At least five days have passed since symptoms first appeared and/or had a close exposure,   2. After home isolation for five days, wearing a mask around others for the next five days,  3. At least 24 have passed since last fever without the use of fever-reducing medications and  4. Symptoms (eg cough, shortness of breath) have improved    If you have any questions or concerns, please don't hesitate to call.     He may return to work/school on 09/22/2022        Dale Campbell,

## 2022-09-15 NOTE — ED PROVIDER NOTES
Department of Emergency Medicine   ED  Provider Note  Admit Date/RoomTime: 9/15/2022  4:32 AM  ED Room: 28/28              Daisy Solano is a 69 y.o. male with a PMHx significant for CAD s/p bypass x 3, DM, HTN, HLD, COPD who presents for evaluation of cough, congestion, headache, beginning prior to arrival.  The complaint has been persistent, moderate in severity, and worsened by nothing. The patient states that he hasn't been sick for 10 years, but has been around a lot of COVID. Notes four days ago he woke up in the middle of night with coughing and congestion. Notes its been getting worse with no improvement. He started taking alkaseltzer cold and flu. Notes yesterday he was sleeping all day. Notes the coughing has been causing chest pain. The history is provided by the patient and medical records. Review of Systems   Constitutional:  Negative for chills and fever. HENT:  Positive for congestion. Negative for ear pain, sinus pressure and sore throat. Eyes:  Negative for pain, discharge and redness. Respiratory:  Positive for cough. Negative for shortness of breath and wheezing. Cardiovascular:  Negative for chest pain. Gastrointestinal:  Negative for abdominal pain, diarrhea, nausea and vomiting. Genitourinary:  Negative for dysuria and frequency. Musculoskeletal:  Negative for arthralgias and back pain. Skin:  Negative for rash and wound. Neurological:  Positive for headaches. Negative for weakness. Hematological:  Negative for adenopathy. All other systems reviewed and are negative. Physical Exam  Vitals and nursing note reviewed. Constitutional:       General: He is not in acute distress. Appearance: Normal appearance. He is well-developed. He is not ill-appearing. HENT:      Head: Normocephalic and atraumatic. Right Ear: External ear normal.      Left Ear: External ear normal.   Eyes:      General:         Right eye: No discharge.          Left eye: No discharge. Extraocular Movements: Extraocular movements intact. Conjunctiva/sclera: Conjunctivae normal.   Cardiovascular:      Rate and Rhythm: Normal rate and regular rhythm. Heart sounds: Normal heart sounds. No murmur heard. Pulmonary:      Effort: Pulmonary effort is normal. No respiratory distress. Breath sounds: Normal breath sounds. No stridor. Abdominal:      General: There is no distension. Palpations: Abdomen is soft. There is no mass. Musculoskeletal:         General: No swelling or tenderness. Cervical back: Normal range of motion and neck supple. Skin:     General: Skin is warm and dry. Coloration: Skin is not jaundiced or pale. Neurological:      General: No focal deficit present. Mental Status: He is alert. Cranial Nerves: No cranial nerve deficit. Procedures    [facility]       ED Course as of 09/22/22 0921   Thu Sep 15, 2022   1555 EKG: This EKG is signed and interpreted by me. Rate: 83  Rhythm: Sinus  Interpretation: non-specific EKG, no ST elevations ST depressions, T wave inversions in V1 and 2 old compared to prior, new T wave inversions in V3, , QRS 90, QTc 434  Comparison: Nonspecific changes compared to prior   [BB]      ED Course User Index  [BB] DO Maral Moraes presents to the ED for evaluation of cough, headache, nasal congestion. States he has been around a lot of people with COVID and is concerned he may have COVID workup in the ED revealed Patient currently COVID-positive. Chest x-ray demonstrating slight left pleural effusion with adjacent hazy opacity likely reflecting around atelectasis as seen on prior CT scan. Initial troponin of 30 with a repeat of 34, when compared to prior he is a 28 and 37 in the past.  Patient not actively having any shortness of breath or chest discomfort. Patient continues to be non-toxic on re-evaluation.  Findings were discussed with the patient and reasons to immediately return to the ED were articulated to them. They will follow-up with their PCP.      --------------------------------------------- PAST HISTORY ---------------------------------------------  Past Medical History:  has a past medical history of Acute blood loss anemia, BPH (benign prostatic hyperplasia), CAD (coronary artery disease), Constipation, COPD (chronic obstructive pulmonary disease) ([facility] ([facility] Hyperlipidemia, Hypotension, Respiratory insufficiency, and Severe protein-calorie malnutrition ([facility] Past Surgical History:  has a past surgical history that includes Colonoscopy; Cholecystectomy; Appendectomy; Tonsillectomy; Knee arthroscopy (Right); pr cabg, arterial, four+ (N/A, 5/8/2018); pr cabg, arterial, four+ (N/A, 5/9/2018); pr [facility] incl fluor gdnce dx w/cell washg spx (N/A, 5/8/2018); Cardiac catheterization (04/26/2018); and sternum separation repair (10/2019). Social History:  reports that he has never smoked. He has never used smokeless tobacco. He reports that he does not drink alcohol and does not use drugs. Family History: family history includes Breast Cancer in his mother; Diabetes in his father; Early Death in his father; High Blood Pressure in his brother; [place] Piggs in his brother. The patients home medications have been reviewed. Allergies: Patient has no known allergies.     -------------------------------------------------- RESULTS -------------------------------------------------  Labs:  Results for orders placed or performed during the hospital encounter of 09/15/22   COVID-19, Rapid    Specimen: Nasopharyngeal Swab   Result Value Ref Range    SARS-CoV-2, NAAT DETECTED (A) Not Detected   CBC with Auto Differential   Result Value Ref Range    WBC 7.8 4.5 - 11.5 E9/L    RBC 4.23 3.80 - 5.80 E12/L    Hemoglobin 13.9 12.5 - 16.5 g/dL    Hematocrit 41.3 37.0 - 54.0 %    MCV 97.6 80.0 - 99.9 fL    MCH 32.9 26.0 - 35.0 pg    MCHC 33.7 32.0 - 34.5 %    RDW 13.2 11.5 - 15.0 fL    Platelets 016 609 - 043 E9/L    MPV 10.0 7.0 - 12.0 fL    Neutrophils % 70.7 43.0 - 80.0 %    Immature Granulocytes % 0.5 0.0 - 5.0 %    Lymphocytes % 11.7 (L) 20.0 - 42.0 %    Monocytes % 15.7 (H) 2.0 - 12.0 %    Eosinophils % 0.8 0.0 - 6.0 %    Basophils % 0.6 0.0 - 2.0 %    Neutrophils Absolute 5.51 1.80 - 7.30 E9/L    Immature Granulocytes # 0.04 E9/L    Lymphocytes Absolute 0.91 (L) 1.50 - 4.00 E9/L    Monocytes Absolute 1.22 (H) 0.10 - 0.95 E9/L    Eosinophils Absolute 0.06 0.05 - 0.50 E9/L    Basophils Absolute 0.05 0.00 - 0.20 E9/L   Comprehensive Metabolic Panel w/ Reflex to MG   Result Value Ref Range    Sodium 137 132 - 146 mmol/L    Potassium reflex Magnesium 3.8 3.5 - 5.0 mmol/L    Chloride 99 98 - 107 mmol/L    CO2 26 22 - 29 mmol/L    Anion Gap 12 7 - 16 mmol/L    Glucose 212 (H) 74 - 99 mg/dL    BUN 10 6 - 23 mg/dL    Creatinine 1.0 0.7 - 1.2 mg/dL    GFR Non-African American >60 >=60 mL/min/1.73    GFR African American >60     Calcium 8.9 8.6 - 10.2 mg/dL    Total Protein 6.4 6.4 - 8.3 g/dL    Albumin 3.7 3.5 - 5.2 g/dL    Total Bilirubin 0.4 0.0 - 1.2 mg/dL    Alkaline Phosphatase 64 40 - 129 U/L    ALT 16 0 - 40 U/L    AST 17 0 - 39 U/L   Troponin   Result Value Ref Range    Troponin, High Sensitivity 30 (H) 0 - 11 ng/L   Troponin   Result Value Ref Range    Troponin, High Sensitivity 34 (H) 0 - 11 ng/L   EKG 12 Lead   Result Value Ref Range    Ventricular Rate 83 BPM    Atrial Rate 83 BPM    P-R Interval 166 ms    QRS Duration 90 ms    Q-T Interval 370 ms    QTc Calculation (Bazett) 434 ms    P Axis 59 degrees    R Axis -3 degrees    T Axis 52 degrees       Radiology:  XR CHEST PORTABLE   Final Result   Small left pleural effusion with adjacent hazy opacity, likely reflecting   rounded atelectasis as seen on prior CT.   No new intrathoracic abnormality.             ------------------------- NURSING NOTES AND VITALS REVIEWED ---------------------------  Date / Time Roomed:  9/15/2022  4:32 AM  ED Bed Assignment:  28/28    The nursing notes within the ED encounter and vital signs as below have been reviewed. /66   Pulse 70   Temp 98.4 °F (36.9 °C) (Oral)   Resp 18   Ht 5' 8\" (1.727 m)   Wt 206 lb (93.4 kg)   SpO2 94%   BMI 31.32 kg/m²   Oxygen Saturation Interpretation: Normal      ------------------------------------------ PROGRESS NOTES ------------------------------------------  9:24 AM EDT  I have spoken with the patient and discussed todays results, in addition to providing specific details for the plan of care and counseling regarding the diagnosis and prognosis. Their questions are answered at this time and they are agreeable with the plan. I discussed at length with them reasons for immediate return here for re evaluation. They will followup with their primary care physician by calling their office tomorrow. --------------------------------- ADDITIONAL PROVIDER NOTES ---------------------------------  At this time the patient is without objective evidence of an acute process requiring hospitalization or inpatient management. They have remained hemodynamically stable throughout their entire ED visit and are stable for discharge with outpatient follow-up. The plan has been discussed in detail and they are aware of the specific conditions for emergent return, as well as the importance of follow-up.       Discharge Medication List as of 9/15/2022  7:51 AM        START taking these medications    Details   Dextromethorphan-guaiFENesin (ROBITUSSIN COUGH+CHEST EVELIO DM)  MG/20ML LIQD Take 20 mLs by mouth every 4 hours as needed (Cough), Disp-237 mL, R-0Normal      benzonatate (TESSALON) 100 MG capsule Take 1 capsule by mouth 2 times daily as needed for Cough, Disp-20 capsule, R-0Normal      ondansetron (ZOFRAN ODT) 8 MG TBDP disintegrating tablet Take 1 tablet by mouth every 8 hours as needed for Nausea, Disp-12 tablet, R-1Normal

## 2022-09-16 LAB
EKG ATRIAL RATE: 83 BPM
EKG P AXIS: 59 DEGREES
EKG P-R INTERVAL: 166 MS
EKG Q-T INTERVAL: 370 MS
EKG QRS DURATION: 90 MS
EKG QTC CALCULATION (BAZETT): 434 MS
EKG R AXIS: -3 DEGREES
EKG T AXIS: 52 DEGREES
EKG VENTRICULAR RATE: 83 BPM

## 2022-10-04 NOTE — DISCHARGE SUMMARY
Can't lm, no vm set up INTERNAL AND GERIATRIC MEDICINE Farrukh Albarran MD, FACP  DISCHARGE SUMMARY-THIS IS DONE IN RETROSPECT PATIENT DISCHARGED WITHOUT MY KNOWLEDGE                                                             SUBJECTIVE: HISTORY POST ACB-OBTAINED THE DAY BEFORE  Bolivar Zavala  is alert, awake, and cooperative. Denies severe chest pain, nausea, dyspnea, emesis. Has controlled incisional pain   No abdominal pain. Tolerating diet. He is post ACB and re-entry for mediastinal bleeding  He does have SOB again  CXR shows atelectasis and small left pleural effusion    ROS:  Negative to a 10 system review except that mentioned in the HPI. Pertinent items are noted in HPI. DIET:Dietary Nutrition Supplements: Diabetic Oral Supplement  Dietary Nutrition Supplements: Wound Healing Oral Supplement    No Known Allergies    MEDICATION SIDE EFFECTS:none    SCHEDULED MEDS:   No current facility-administered medications for this encounter. Current Outpatient Prescriptions   Medication Sig Dispense Refill    HYDROcodone-acetaminophen (NORCO) 5-325 MG per tablet Take 1-2 tablets every 8 hours as needed for pain.  Earliest Fill Date: 5/13/18 42 tablet 0    amiodarone (CORDARONE) 200 MG tablet Take 400 mg orally twice daily for five days, then take 200 mg orally twice daily for five days, then take 200 mg orally daily for fourteen days, then stop 44 tablet 0    metoprolol tartrate (LOPRESSOR) 50 MG tablet Take 1 tablet by mouth 2 times daily 60 tablet 1    ferrous sulfate 325 (65 Fe) MG tablet Take 1 tablet by mouth 2 times daily (with meals) 60 tablet 0    folic acid (FOLVITE) 1 MG tablet Take 1 tablet by mouth daily 30 tablet 0    docusate (COLACE, DULCOLAX) 100 MG CAPS Take 100 mg by mouth 2 times daily as needed (constipation) 20 capsule 0    magnesium oxide (MAG-OX) 400 (240 Mg) MG tablet Take 1 tablet by mouth daily for 10 days 10 tablet 0    amoxicillin-clavulanate (AUGMENTIN) 875-125 MG per tablet Take 1 tablet by mouth every 12 hours for 10 days 20 tablet 0    ascorbic acid (VITAMIN C) 500 MG tablet Take 1 tablet by mouth 2 times daily 60 tablet 0    albuterol-ipratropium (COMBIVENT RESPIMAT)  MCG/ACT AERS inhaler Inhale 1 puff into the lungs 4 times daily 1 Inhaler 0    nitroGLYCERIN (NITROSTAT) 0.3 MG SL tablet up to max of 3 total doses. If no relief after 1 dose, call 911. 30 tablet 0    dutasteride (AVODART) 0.5 MG capsule Take 0.5 mg by mouth daily      aspirin 81 MG chewable tablet Take 81 mg by mouth daily      tamsulosin (FLOMAX) 0.4 MG capsule Take 0.4 mg by mouth 2 times daily       atorvastatin (LIPITOR) 40 MG tablet Take 40 mg by mouth daily.  glipiZIDE (GLIPIZIDE XL) 10 MG CR tablet Take 10 mg by mouth daily.  metFORMIN (GLUCOPHAGE) 1000 MG tablet Take 1,000 mg by mouth 2 times daily (with meals). I have reviewed the patient's medications; see Medication Reconciliation. Current  Infusions      Prn Meds     OBJECTIVE:  Wt Readings from Last 3 Encounters:   05/13/18 232 lb 3.2 oz (105.3 kg)   05/05/18 220 lb (99.8 kg)   05/04/18 221 lb (100.2 kg)     Temp Readings from Last 3 Encounters:   05/13/18 97.9 °F (36.6 °C)   05/09/18 98.6 °F (37 °C)   05/08/18 98.2 °F (36.8 °C)     BP Readings from Last 3 Encounters:   05/13/18 109/66   05/08/18 116/70   05/05/18 119/74     Pulse Readings from Last 3 Encounters:   05/13/18 97   05/05/18 75   05/04/18 78     @LASTSAO2(3)@    General appearance: alert, appears stated age and cooperative  Skin: Skin color, texture, turgor normal. No rashes or lesions  HEENT: Head: Normocephalic, no lesions, without obvious abnormality.   Neck: no adenopathy, supple, symmetrical, trachea midline and thyroid not enlarged, symmetric, no tenderness/mass/nodules  Lungs: clear to auscultation bilaterally and diminished breath sounds bibasilar  Heart: S1S2 rapid but regular  Abdomen: soft, non-tender; bowel sounds normal; no masses,  no organomegaly  Extremities: extremities normal, atraumatic, no cyanosis or edema  Neurologic:  Alert, oriented, thought content appropriate    CBC:   Lab Results   Component Value Date    WBC 11.8 05/13/2018    RBC 3.35 05/13/2018    HGB 10.1 05/13/2018    HCT 31.1 05/13/2018    MCV 92.8 05/13/2018    MCH 30.1 05/13/2018    MCHC 32.5 05/13/2018    RDW 15.5 05/13/2018     05/13/2018    MPV 9.7 05/13/2018     Hemoglobin/Hematocrit:    Lab Results   Component Value Date    HGB 10.1 05/13/2018    HCT 31.1 05/13/2018       TELEMETRY: REVIEWED--Telemetry: Sinus    RADIOLOGY: REVIEWED AVAILABLE REPORT  XR CHEST STANDARD (2 VW)   Final Result   Left-sided pleural effusion with associated patchy left   basal airspace opacity. Recommend clinical correlation. US CHEST INCLUDING MEDIASTINUM   Final Result   Minimal left pleural fluid         XR CHEST PORTABLE   Final Result   Small left pleural effusion with adjacent atelectasis and/or   infiltrates, not significantly changed. XR CHEST PORTABLE   Final Result   1. Since examination dated 05/10/2018, interval increase in size of   the left-sided pleural effusion with worsening patchy left lower lobe   airspace opacity. Recommend clinical correlation. 2. Unchanged postoperative lines and tubes as described above. XR CHEST PORTABLE   Final Result   1. Since examination dated 05/09/2018, interval removal of the   endotracheal tube and nasogastric tube. 2. Relatively unchanged left-sided pleural effusion with associated   patchy left lower lobe airspace opacity. Recommend clinical   correlation. 3. Unchanged remaining postoperative lines and tubes as described   above. XR CHEST PORTABLE   Final Result   1. Right-sided central line/introducer. 2. Stable, enlarged cardiomediastinal silhouette. .   3. Left basilar infiltrate/pneumonia left pleural effusion.    4. Interval placement of mediastinal drains, left-sided chest tube, endotracheal tube and NG tube as described above. 5. Suspected underlying mild central pulmonary vascular congestion. XR CHEST PORTABLE   Final Result   ALERT:  THIS IS AN ABNORMAL REPORT   1. Increased enlargement of the cardiomediastinal silhouette when   compared with the previous exam, particularly in the left hilar   region. . The possibility of underlying pericardial effusion or other   cardiac abnormalities is not excluded on the basis of this exam in   this postoperative patient, clinical correlation recommended. 2. Left basilar infiltrate/pneumonia left pleural effusion. 3. Interval removal of endotracheal tube and NG tube. 4. Possible underlying pulmonary edema. XR CHEST PORTABLE   Final Result   1. Left basilar infiltrate/pneumonia left pleural effusion. 2. Suspected underlying mild central pulmonary vascular congestion. 3. Distal NG tube not well identified. 4. Suspected underlying mild central pulmonary vascular congestion      XR CHEST PORTABLE   Final Result   1. Suspected underlying mild central pulmonary vascular congestion. 2. Left basilar infiltrate/pneumonia left pleural effusion. 3. Interval placement of right-sided internal jugular line introducer,   endotracheal tube, median sternotomy wires, left-sided chest tube and   mediastinal drains as described above. 4. The NG tube is not well identified. The distal tips not seen.           CONSULTANT NOTES AND RECOMMENDATIONS REVIEWED:    ASSESSMENT:  S/P CABG  Friction rub vs Mumur -resolved with removal of the chest tubes  Pleural effusion left-minimal per report  Patient Active Problem List   Diagnosis    CAD in native artery    Respiratory insufficiency    Acute post-operative pain    Type 2 diabetes mellitus without complication, without long-term current use of insulin ([facility])    S/P CABG x 2    Pre-op testing    Acute blood loss anemia    Hypotension due to blood loss       PLAN:   He was comfotable  Reviewed the labs and consults-  Pulmonary critical care CLINICAL ASSESMENT:  1. CABG x2   2. Reexploration for bleeding (increase drainage)  3. Normal lung function  4. LLL collapse improved  5. CAD  6. HTN  7. HLN  8. DM  9. BPH     PLAN: If needed the case was discussed with the care team     1. Ok to discharge  2. Keep Hb > 8   3. Check TSH - normal  4. Bronchodilators duonebs for 4 weeks    Cardiology Plan: #1 coronary artery disease status post CABG postoperative day #5 today. Reviewed with him risk stratification and long-term management of cardiovascular risk factors. Continue aspirin 81 mg daily, atorvastatin 40 mg daily, metoprolol 50 mg twice a day. He could be discharged to home this afternoon from cardiology viewpoint and follow-up in my office [facility] as an outpatient in 1 month. #2 diabetes and diabetic education reviewed with the patient. Stressed to him that he should follow a low-sodium low-fat low carbohydrate diet. Told him long-term would like to see hemoglobin A1c less than 7 or 6.5. #3 hyperlipidemia and continue atorvastatin 40 mg daily. #4 dietary education and exercise discussed with patient postoperatively. Plan discharge to home this afternoon from cardiology viewpoint in outpatient follow-up in my office in one month.         See  Orders  Constantino CHEWOMATE, [employer]  7:47 AM  5/14/2018

## 2023-06-05 NOTE — ED NOTES
Pt denies loss of consciousness. States he felt very lightheaded and vision blurred. States he fell against the bumper of a car then to ground. Denies head injury. Small abrasions noted on rt elbow and rt knee.  Denies any injury other than these abrasions     Delisa Camara RN  08/01/18 7680 (M6) obeys commands

## 2023-06-26 NOTE — PROGRESS NOTES
No problem-specific Assessment & Plan notes found for this encounter. [facility]    INPATIENT CARDIOLOGY FOLLOW-UP    Name: Marnie Perez    Age: 68 y.o. Date of Admission: 5/8/2018  5:08 AM    Date of Service: 5/10/2018    Chief Complaint: Follow-up for CAD, status post CABG ×2 on 5/8 and reexploration for bleeding on 5/9    Interim History:  Receive 3 units of PRBCs yesterday with 2 units ordered this morning. Extubated and alert with only mild chest wall pain. Amiodarone started prophylactically for AF    Telemetry  reviewed and showed sinus tachycardia  Hemoglobin 7.8, platelet 989. Potassium 3.9, creatinine 0.8.   Chest x-ray images reviewed: Left pleural effusion        Review of Systems:   Cardiac: As per HPI  General: No fever, chills  Pulmonary: No cough, wheeze, or shortness of breath  GI: No nausea, vomiting,or abdominal pain  Neuro: No headache or confusion      Allergies:  No Known Allergies    Current Medications:  Current Facility-Administered Medications   Medication Dose Route Frequency Provider Last Rate Last Dose    furosemide (LASIX) injection 20 mg  20 mg Intravenous Once KIM Mondragon CNP        amiodarone (CORDARONE) tablet 400 mg  400 mg Oral BID KIM Mondragon CNP   400 mg at 05/10/18 0920    0.9 % sodium chloride bolus  250 mL Intravenous Once Constance Guzmán DO        insulin glargine (LANTUS) injection vial 22 Units  22 Units Subcutaneous Nightly KIM Escobar CNP   22 Units at 05/09/18 2144    metoprolol tartrate (LOPRESSOR) tablet 25 mg  25 mg Oral BID KIM Mondragon CNP   25 mg at 05/10/18 0800    atorvastatin (LIPITOR) tablet 40 mg  40 mg Oral Daily Constance Guzmán, DO   40 mg at 05/09/18 2142    tamsulosin (FLOMAX) capsule 0.4 mg  0.4 mg Oral BID Constance Blantond, DO   0.4 mg at 05/10/18 0759    0.9 % sodium chloride infusion  30 mL/hr Intravenous Continuous Constance Blantond, DO 35 mL/hr at 05/09/18 2000 35 mL/hr at 05/09/18 [FreeTextEntry1] : IMP:\par Dave is a 63 y/o with worsening dysphagia and presented for an Upper EUS which found a lesion almost completely obstructing esophageal/GE junction tumor. Stent was placed.\par \par PLAN:\par Robotic-assisted laparoscopic esophagogastrectomy, esophagogastroduodenoscopy, possible open.\par We discussed the risks, benefits, and alternatives of the procedure with the patient, he expressed understanding and agree to proceed.  2000    sodium chloride flush 0.9 % injection 10 mL  10 mL Intravenous 2 times per day Darling Silk, DO   10 mL at 05/10/18 0914    sodium chloride flush 0.9 % injection 10 mL  10 mL Intravenous PRN Darling Silk, DO        potassium chloride 20 mEq/50 mL IVPB (Central Line)  20 mEq Intravenous PRN Darling Silk, DO   Stopped at 05/08/18 1819    magnesium sulfate 2 g in 50 mL IVPB premix  2 g Intravenous PRN Darling Silk, DO        acetaminophen (TYLENOL) tablet 650 mg  650 mg Oral Q4H PRN Darling Silk, DO        oxyCODONE-acetaminophen (PERCOCET) 5-325 MG per tablet 1 tablet  1 tablet Oral Q4H PRN Darling Silk, DO        Or    oxyCODONE-acetaminophen (PERCOCET) 5-325 MG per tablet 2 tablet  2 tablet Oral Q4H PRN Darling Silk, DO        docusate sodium (COLACE) capsule 100 mg  100 mg Oral BID PRN Darling Silk, DO        ondansetron [facility]) injection 4 mg  4 mg Intravenous Q8H PRN Darling Silk, DO   4 mg at 05/10/18 0130    aspirin EC tablet 81 mg  81 mg Oral Daily Darling Silk, DO   81 mg at 05/10/18 0759    albumin human 5 % bottle 25 g  25 g Intravenous PRN Darling Silk, DO   25 g at 05/08/18 1448    0.9 % sodium chloride bolus  250 mL Intravenous Continuous PRN Darling Silk, DO   Stopped at 05/08/18 2030    phenylephrine (KATELYN-SYNEPHRINE) 50 mg in dextrose 5 % 250 mL infusion  10 mcg/min Intravenous Continuous PRN Darling Silk, DO        norepinephrine (LEVOPHED) 16 mg in dextrose 5 % 250 mL infusion  2 mcg/min Intravenous Continuous PRN Darling Silk, DO   Stopped at 05/09/18 0025    clevidipine (CLEVIPREX) infusion  2 mg/hr Intravenous Continuous PRN Darling Silk, DO        insulin regular (HUMULIN R;NOVOLIN R) 100 Units in sodium chloride 0.9 % 100 mL infusion  1 Units/hr Intravenous Continuous PRN Darling Silk, DO   Stopped at 05/10/18 0100    glucose (GLUTOSE) 40 % oral gel 15 g  15 g Oral PRN Darling Silk, DO        dextrose 50 % solution 12.5 g  12.5 g Intravenous PRN Neoma Grief, DO        glucagon (rDNA) injection 1 mg  1 mg Intramuscular PRN Neoma Grief, DO        dextrose 5 % solution  100 mL/hr Intravenous PRN Neoma Grief, DO        ipratropium-albuterol (DUONEB) nebulizer solution 1 ampule  1 ampule Inhalation Q4H WA Neoma Grief, DO   1 ampule at 05/10/18 0818    insulin lispro (HUMALOG) injection vial 0-18 Units  0-18 Units Subcutaneous Q4H Neoma Grief, DO   12 Units at 05/10/18 0936    fentaNYL (SUBLIMAZE) injection 25 mcg  25 mcg Intravenous Q1H PRN Neoma Grief, DO   25 mcg at 05/10/18 0756    mupirocin (BACTROBAN) 2 % ointment   Nasal BID Neoma Grief, DO        magnesium oxide (MAG-OX) tablet 400 mg  400 mg Oral Daily Neoma Grief, DO   400 mg at 05/10/18 0759    acetaminophen (TYLENOL) suppository 650 mg  650 mg Rectal Q4H PRN Neoma Grief, DO        calcium gluconate 1 g in dextrose 5 % 50 mL IVPB  1 g Intravenous PRN Neoma Grief, DO   Stopped at 05/10/18 0900    propofol 1000 MG/100ML injection  10 mcg/kg/min Intravenous Titrated Estelita Juliano, APRN - CNP   Stopped at 05/09/18 1155    cefTRIAXone (ROCEPHIN) 1 g in sterile water 10 mL IV syringe  1 g Intravenous Q24H [facility], DO   1 g at 05/09/18 1815    budesonide (PULMICORT) nebulizer suspension 500 mcg  0.5 mg Nebulization BID [facility], DO   500 mcg at 05/10/18 0818    vasopressin 20 Units in dextrose 5 % 100 mL infusion  0.02 Units/min Intravenous Continuous Neoma Grief, DO 12 mL/hr at 05/09/18 0809 0.04 Units/min at 05/09/18 0809      sodium chloride 35 mL/hr (05/09/18 2000)    sodium chloride Stopped (05/08/18 2030)    phenylephrine (KATELYN-SYNEPHRINE) 50mg/250mL infusion      norepinephrine Stopped (05/09/18 0025)    clevidipine      insulin (HUMAN R) non-weight based infusion Stopped (05/10/18 0100)    dextrose      propofol Stopped (05/09/18 1155)    vasopressin infusion 0.04 Units/min (05/09/18 0809)       Physical AST  26   PROT  4.6*   BILITOT  0.7   LABALBU  3.0*     Lab Results   Component Value Date    MG 2.1 05/10/2018     Lab Results   Component Value Date    PROTIME 14.9 05/09/2018    INR 1.3 05/09/2018       Radiology:  XR CHEST PORTABLE   Final Result   1. Since examination dated 05/09/2018, interval removal of the   endotracheal tube and nasogastric tube. 2. Relatively unchanged left-sided pleural effusion with associated   patchy left lower lobe airspace opacity. Recommend clinical   correlation. 3. Unchanged remaining postoperative lines and tubes as described   above. XR CHEST PORTABLE   Final Result   1. Right-sided central line/introducer. 2. Stable, enlarged cardiomediastinal silhouette. .   3. Left basilar infiltrate/pneumonia left pleural effusion. 4. Interval placement of mediastinal drains, left-sided chest tube,   endotracheal tube and NG tube as described above. 5. Suspected underlying mild central pulmonary vascular congestion. XR CHEST PORTABLE   Final Result   ALERT:  THIS IS AN ABNORMAL REPORT   1. Increased enlargement of the cardiomediastinal silhouette when   compared with the previous exam, particularly in the left hilar   region. . The possibility of underlying pericardial effusion or other   cardiac abnormalities is not excluded on the basis of this exam in   this postoperative patient, clinical correlation recommended. 2. Left basilar infiltrate/pneumonia left pleural effusion. 3. Interval removal of endotracheal tube and NG tube. 4. Possible underlying pulmonary edema. XR CHEST PORTABLE   Final Result   1. Left basilar infiltrate/pneumonia left pleural effusion. 2. Suspected underlying mild central pulmonary vascular congestion. 3. Distal NG tube not well identified. 4. Suspected underlying mild central pulmonary vascular congestion      XR CHEST PORTABLE   Final Result   1. Suspected underlying mild central pulmonary vascular congestion.    2. Left basilar infiltrate/pneumonia left pleural effusion. 3. Interval placement of right-sided internal jugular line introducer,   endotracheal tube, median sternotomy wires, left-sided chest tube and   mediastinal drains as described above. 4. The NG tube is not well identified. The distal tips not seen. XR CHEST PORTABLE    (Results Pending)     Date of Procedure: 5/8/2018     Pre-Op Diagnosis: CAD     Post-Op Diagnosis: Same     Procedure(s):  CABG CORONARY ARTERY BYPASS times 2  LIMA to LAD  SVG to OM1     Anesthesia: General     Surgeon(s):  Jeaneth Ahmadi DO       Problem List:  Active Hospital Problems    Diagnosis    Acute blood loss anemia [D62]    Hypotension due to blood loss [I95.89]    CAD in native artery [I25.10]    Respiratory insufficiency [R06.89]    Acute post-operative pain [G89.18]    Type 2 diabetes mellitus without complication, without long-term current use of insulin (HCC) [E11.9]    S/P CABG x 2 [Z95.1]    Pre-op testing [Z01.818]         ASSESSMENT/PLAN:  1. CAD status post CABG ×2 (L-LAD, V-OM) and reexploration for bleeding. Currently hemodynamically stable. Metoprolol, aspirin and statin has been restarted. Sinus tachycardia secondary to blood loss anemia. Amiodarone started for prophylaxis against A. fib. 2. Acute blood loss anemia. Transfusion as ordered. Lasix later today    3. Hypotension has resolved,     4. Postop respiratory insufficiency/pleural effusion. Currently, stable, with SPO2 93% on 4 L    5.  Diabetes-continue insulin coverage    Electronically signed by Eleazar Chaney MD on 5/10/2018 at 9:53 AM

## 2024-05-03 ENCOUNTER — APPOINTMENT (OUTPATIENT)
Dept: CT IMAGING | Age: 79
End: 2024-05-03
Payer: COMMERCIAL

## 2024-05-03 ENCOUNTER — APPOINTMENT (OUTPATIENT)
Dept: GENERAL RADIOLOGY | Age: 79
End: 2024-05-03
Payer: COMMERCIAL

## 2024-05-03 ENCOUNTER — HOSPITAL ENCOUNTER (OUTPATIENT)
Age: 79
Setting detail: OBSERVATION
Discharge: HOME | End: 2024-05-04
Attending: EMERGENCY MEDICINE | Admitting: INTERNAL MEDICINE
Payer: COMMERCIAL

## 2024-05-03 DIAGNOSIS — R07.9 CHEST PAIN, UNSPECIFIED TYPE: ICD-10-CM

## 2024-05-03 DIAGNOSIS — R06.09 DYSPNEA ON EXERTION: ICD-10-CM

## 2024-05-03 LAB
ALBUMIN SERPL-MCNC: 4.4 G/DL (ref 3.5–5.2)
ALP SERPL-CCNC: 76 U/L (ref 40–129)
ALT SERPL-CCNC: 18 U/L (ref 0–40)
ANION GAP SERPL CALCULATED.3IONS-SCNC: 11 MMOL/L (ref 7–16)
AST SERPL-CCNC: 20 U/L (ref 0–39)
BASOPHILS # BLD: 0.08 K/UL (ref 0–0.2)
BASOPHILS NFR BLD: 1 % (ref 0–2)
BILIRUB SERPL-MCNC: 0.7 MG/DL (ref 0–1.2)
BNP SERPL-MCNC: 125 PG/ML (ref 0–450)
BUN SERPL-MCNC: 10 MG/DL (ref 6–23)
CALCIUM SERPL-MCNC: 9.5 MG/DL (ref 8.6–10.2)
CHLORIDE SERPL-SCNC: 103 MMOL/L (ref 98–107)
CO2 SERPL-SCNC: 24 MMOL/L (ref 22–29)
CREAT SERPL-MCNC: 1 MG/DL (ref 0.7–1.2)
D DIMER: 241 NG/ML DDU (ref 0–232)
EKG ATRIAL RATE: 75 BPM
EKG P AXIS: 71 DEGREES
EKG P-R INTERVAL: 188 MS
EKG Q-T INTERVAL: 412 MS
EKG QRS DURATION: 92 MS
EKG QTC CALCULATION (BAZETT): 460 MS
EKG R AXIS: 42 DEGREES
EKG T AXIS: 62 DEGREES
EKG VENTRICULAR RATE: 75 BPM
EOSINOPHIL # BLD: 0.33 K/UL (ref 0.05–0.5)
EOSINOPHILS RELATIVE PERCENT: 4 % (ref 0–6)
ERYTHROCYTE [DISTWIDTH] IN BLOOD BY AUTOMATED COUNT: 12.9 % (ref 11.5–15)
GFR, ESTIMATED: 76 ML/MIN/1.73M2
GLUCOSE BLD-MCNC: 134 MG/DL (ref 74–99)
GLUCOSE BLD-MCNC: 169 MG/DL (ref 74–99)
GLUCOSE SERPL-MCNC: 152 MG/DL (ref 74–99)
HCT VFR BLD AUTO: 45.2 % (ref 37–54)
HGB BLD-MCNC: 15.1 G/DL (ref 12.5–16.5)
IMM GRANULOCYTES # BLD AUTO: <0.03 K/UL (ref 0–0.58)
IMM GRANULOCYTES NFR BLD: 0 % (ref 0–5)
LYMPHOCYTES NFR BLD: 2.95 K/UL (ref 1.5–4)
LYMPHOCYTES RELATIVE PERCENT: 35 % (ref 20–42)
MCH RBC QN AUTO: 32.1 PG (ref 26–35)
MCHC RBC AUTO-ENTMCNC: 33.4 G/DL (ref 32–34.5)
MCV RBC AUTO: 96 FL (ref 80–99.9)
MONOCYTES NFR BLD: 0.81 K/UL (ref 0.1–0.95)
MONOCYTES NFR BLD: 10 % (ref 2–12)
NEUTROPHILS NFR BLD: 50 % (ref 43–80)
NEUTS SEG NFR BLD: 4.23 K/UL (ref 1.8–7.3)
PLATELET # BLD AUTO: 227 K/UL (ref 130–450)
PMV BLD AUTO: 10.6 FL (ref 7–12)
POTASSIUM SERPL-SCNC: 4.6 MMOL/L (ref 3.5–5)
PROT SERPL-MCNC: 6.9 G/DL (ref 6.4–8.3)
RBC # BLD AUTO: 4.71 M/UL (ref 3.8–5.8)
SODIUM SERPL-SCNC: 138 MMOL/L (ref 132–146)
TROPONIN I SERPL HS-MCNC: 25 NG/L (ref 0–11)
TROPONIN I SERPL HS-MCNC: 25 NG/L (ref 0–11)
TROPONIN I SERPL HS-MCNC: 30 NG/L (ref 0–11)
WBC OTHER # BLD: 8.4 K/UL (ref 4.5–11.5)

## 2024-05-03 PROCEDURE — G0378 HOSPITAL OBSERVATION PER HR: HCPCS

## 2024-05-03 PROCEDURE — 71275 CT ANGIOGRAPHY CHEST: CPT

## 2024-05-03 PROCEDURE — 71046 X-RAY EXAM CHEST 2 VIEWS: CPT

## 2024-05-03 PROCEDURE — 85379 FIBRIN DEGRADATION QUANT: CPT

## 2024-05-03 PROCEDURE — 99285 EMERGENCY DEPT VISIT HI MDM: CPT

## 2024-05-03 PROCEDURE — 85025 COMPLETE CBC W/AUTO DIFF WBC: CPT

## 2024-05-03 PROCEDURE — 84484 ASSAY OF TROPONIN QUANT: CPT

## 2024-05-03 PROCEDURE — 93010 ELECTROCARDIOGRAM REPORT: CPT | Performed by: INTERNAL MEDICINE

## 2024-05-03 PROCEDURE — 83880 ASSAY OF NATRIURETIC PEPTIDE: CPT

## 2024-05-03 PROCEDURE — 96372 THER/PROPH/DIAG INJ SC/IM: CPT

## 2024-05-03 PROCEDURE — 82962 GLUCOSE BLOOD TEST: CPT

## 2024-05-03 PROCEDURE — 80053 COMPREHEN METABOLIC PANEL: CPT

## 2024-05-03 PROCEDURE — 6370000000 HC RX 637 (ALT 250 FOR IP): Performed by: INTERNAL MEDICINE

## 2024-05-03 PROCEDURE — 6360000002 HC RX W HCPCS: Performed by: INTERNAL MEDICINE

## 2024-05-03 PROCEDURE — 6360000004 HC RX CONTRAST MEDICATION: Performed by: RADIOLOGY

## 2024-05-03 PROCEDURE — 93005 ELECTROCARDIOGRAM TRACING: CPT | Performed by: EMERGENCY MEDICINE

## 2024-05-03 RX ORDER — POTASSIUM CHLORIDE 7.45 MG/ML
10 INJECTION INTRAVENOUS PRN
Status: DISCONTINUED | OUTPATIENT
Start: 2024-05-03 | End: 2024-05-04 | Stop reason: HOSPADM

## 2024-05-03 RX ORDER — DOCUSATE SODIUM 100 MG/1
100 CAPSULE, LIQUID FILLED ORAL 2 TIMES DAILY PRN
Status: DISCONTINUED | OUTPATIENT
Start: 2024-05-03 | End: 2024-05-04 | Stop reason: HOSPADM

## 2024-05-03 RX ORDER — INSULIN LISPRO 100 [IU]/ML
0-8 INJECTION, SOLUTION INTRAVENOUS; SUBCUTANEOUS
Status: DISCONTINUED | OUTPATIENT
Start: 2024-05-03 | End: 2024-05-04 | Stop reason: HOSPADM

## 2024-05-03 RX ORDER — ASPIRIN 81 MG/1
324 TABLET, CHEWABLE ORAL ONCE
Status: DISCONTINUED | OUTPATIENT
Start: 2024-05-03 | End: 2024-05-04 | Stop reason: HOSPADM

## 2024-05-03 RX ORDER — PROPRANOLOL HYDROCHLORIDE 20 MG/1
20 TABLET ORAL NIGHTLY
Status: DISCONTINUED | OUTPATIENT
Start: 2024-05-04 | End: 2024-05-04 | Stop reason: HOSPADM

## 2024-05-03 RX ORDER — ASPIRIN 81 MG/1
81 TABLET ORAL EVERY MORNING
COMMUNITY

## 2024-05-03 RX ORDER — ENOXAPARIN SODIUM 100 MG/ML
40 INJECTION SUBCUTANEOUS DAILY
Status: DISCONTINUED | OUTPATIENT
Start: 2024-05-03 | End: 2024-05-04 | Stop reason: HOSPADM

## 2024-05-03 RX ORDER — LANOLIN ALCOHOL/MO/W.PET/CERES
1000 CREAM (GRAM) TOPICAL EVERY MORNING
COMMUNITY

## 2024-05-03 RX ORDER — ALOGLIPTIN 12.5 MG/1
12.5 TABLET, FILM COATED ORAL DAILY
Status: DISCONTINUED | OUTPATIENT
Start: 2024-05-04 | End: 2024-05-04 | Stop reason: HOSPADM

## 2024-05-03 RX ORDER — DEXTROSE MONOHYDRATE 100 MG/ML
INJECTION, SOLUTION INTRAVENOUS CONTINUOUS PRN
Status: DISCONTINUED | OUTPATIENT
Start: 2024-05-03 | End: 2024-05-04 | Stop reason: HOSPADM

## 2024-05-03 RX ORDER — ONDANSETRON 4 MG/1
4 TABLET, ORALLY DISINTEGRATING ORAL EVERY 8 HOURS PRN
Status: DISCONTINUED | OUTPATIENT
Start: 2024-05-03 | End: 2024-05-04 | Stop reason: HOSPADM

## 2024-05-03 RX ORDER — ATORVASTATIN CALCIUM 40 MG/1
40 TABLET, FILM COATED ORAL NIGHTLY
COMMUNITY

## 2024-05-03 RX ORDER — GLUCAGON 1 MG/ML
1 KIT INJECTION PRN
Status: DISCONTINUED | OUTPATIENT
Start: 2024-05-03 | End: 2024-05-04 | Stop reason: HOSPADM

## 2024-05-03 RX ORDER — LISINOPRIL 20 MG/1
40 TABLET ORAL DAILY
Status: DISCONTINUED | OUTPATIENT
Start: 2024-05-03 | End: 2024-05-04 | Stop reason: HOSPADM

## 2024-05-03 RX ORDER — INSULIN LISPRO 100 [IU]/ML
0-4 INJECTION, SOLUTION INTRAVENOUS; SUBCUTANEOUS NIGHTLY
Status: DISCONTINUED | OUTPATIENT
Start: 2024-05-03 | End: 2024-05-04 | Stop reason: HOSPADM

## 2024-05-03 RX ORDER — M-VIT,TX,IRON,MINS/CALC/FOLIC 27MG-0.4MG
1 TABLET ORAL EVERY MORNING
COMMUNITY

## 2024-05-03 RX ORDER — ACETAMINOPHEN 650 MG/1
650 SUPPOSITORY RECTAL EVERY 6 HOURS PRN
Status: DISCONTINUED | OUTPATIENT
Start: 2024-05-03 | End: 2024-05-04 | Stop reason: HOSPADM

## 2024-05-03 RX ORDER — OXYCODONE HYDROCHLORIDE 5 MG/1
5 TABLET ORAL EVERY 6 HOURS PRN
Status: DISCONTINUED | OUTPATIENT
Start: 2024-05-03 | End: 2024-05-04 | Stop reason: HOSPADM

## 2024-05-03 RX ORDER — ONDANSETRON 2 MG/ML
4 INJECTION INTRAMUSCULAR; INTRAVENOUS EVERY 6 HOURS PRN
Status: DISCONTINUED | OUTPATIENT
Start: 2024-05-03 | End: 2024-05-04 | Stop reason: HOSPADM

## 2024-05-03 RX ORDER — POLYETHYLENE GLYCOL 3350 17 G/17G
17 POWDER, FOR SOLUTION ORAL DAILY PRN
Status: DISCONTINUED | OUTPATIENT
Start: 2024-05-03 | End: 2024-05-04 | Stop reason: HOSPADM

## 2024-05-03 RX ORDER — PROPRANOLOL HYDROCHLORIDE 20 MG/1
20 TABLET ORAL EVERY MORNING
COMMUNITY

## 2024-05-03 RX ORDER — LANOLIN ALCOHOL/MO/W.PET/CERES
3 CREAM (GRAM) TOPICAL NIGHTLY PRN
Status: DISCONTINUED | OUTPATIENT
Start: 2024-05-03 | End: 2024-05-04 | Stop reason: HOSPADM

## 2024-05-03 RX ORDER — REGADENOSON 0.08 MG/ML
0.4 INJECTION, SOLUTION INTRAVENOUS
Status: COMPLETED | OUTPATIENT
Start: 2024-05-03 | End: 2024-05-04

## 2024-05-03 RX ORDER — TAMSULOSIN HYDROCHLORIDE 0.4 MG/1
0.4 CAPSULE ORAL DAILY
Status: DISCONTINUED | OUTPATIENT
Start: 2024-05-03 | End: 2024-05-04 | Stop reason: HOSPADM

## 2024-05-03 RX ORDER — TAMSULOSIN HYDROCHLORIDE 0.4 MG/1
0.4 CAPSULE ORAL NIGHTLY
COMMUNITY

## 2024-05-03 RX ORDER — MAGNESIUM SULFATE IN WATER 40 MG/ML
2000 INJECTION, SOLUTION INTRAVENOUS PRN
Status: DISCONTINUED | OUTPATIENT
Start: 2024-05-03 | End: 2024-05-04 | Stop reason: HOSPADM

## 2024-05-03 RX ORDER — POTASSIUM CHLORIDE 20 MEQ/1
40 TABLET, EXTENDED RELEASE ORAL PRN
Status: DISCONTINUED | OUTPATIENT
Start: 2024-05-03 | End: 2024-05-04 | Stop reason: HOSPADM

## 2024-05-03 RX ORDER — ACETAMINOPHEN 325 MG/1
650 TABLET ORAL EVERY 6 HOURS PRN
Status: DISCONTINUED | OUTPATIENT
Start: 2024-05-03 | End: 2024-05-04 | Stop reason: HOSPADM

## 2024-05-03 RX ORDER — ASPIRIN 81 MG/1
81 TABLET, CHEWABLE ORAL DAILY
Status: DISCONTINUED | OUTPATIENT
Start: 2024-05-04 | End: 2024-05-04 | Stop reason: HOSPADM

## 2024-05-03 RX ORDER — SENNA AND DOCUSATE SODIUM 50; 8.6 MG/1; MG/1
1 TABLET, FILM COATED ORAL 2 TIMES DAILY
Status: DISCONTINUED | OUTPATIENT
Start: 2024-05-03 | End: 2024-05-04 | Stop reason: HOSPADM

## 2024-05-03 RX ORDER — FLUDROCORTISONE ACETATE 0.1 MG/1
0.1 TABLET ORAL DAILY
Status: DISCONTINUED | OUTPATIENT
Start: 2024-05-03 | End: 2024-05-04 | Stop reason: HOSPADM

## 2024-05-03 RX ORDER — ATORVASTATIN CALCIUM 40 MG/1
40 TABLET, FILM COATED ORAL NIGHTLY
Status: DISCONTINUED | OUTPATIENT
Start: 2024-05-03 | End: 2024-05-04 | Stop reason: HOSPADM

## 2024-05-03 RX ORDER — PANTOPRAZOLE SODIUM 40 MG/1
40 TABLET, DELAYED RELEASE ORAL
Status: DISCONTINUED | OUTPATIENT
Start: 2024-05-04 | End: 2024-05-04 | Stop reason: HOSPADM

## 2024-05-03 RX ADMIN — ENOXAPARIN SODIUM 40 MG: 100 INJECTION SUBCUTANEOUS at 17:47

## 2024-05-03 RX ADMIN — ATORVASTATIN CALCIUM 40 MG: 40 TABLET, FILM COATED ORAL at 20:23

## 2024-05-03 RX ADMIN — TAMSULOSIN HYDROCHLORIDE 0.4 MG: 0.4 CAPSULE ORAL at 17:47

## 2024-05-03 RX ADMIN — IOPAMIDOL 75 ML: 755 INJECTION, SOLUTION INTRAVENOUS at 15:45

## 2024-05-03 ASSESSMENT — PAIN DESCRIPTION - LOCATION: LOCATION: CHEST

## 2024-05-03 ASSESSMENT — LIFESTYLE VARIABLES
HOW MANY STANDARD DRINKS CONTAINING ALCOHOL DO YOU HAVE ON A TYPICAL DAY: 1 OR 2
HOW OFTEN DO YOU HAVE A DRINK CONTAINING ALCOHOL: MONTHLY OR LESS

## 2024-05-03 ASSESSMENT — PAIN SCALES - GENERAL: PAINLEVEL_OUTOF10: 1

## 2024-05-03 ASSESSMENT — PAIN - FUNCTIONAL ASSESSMENT: PAIN_FUNCTIONAL_ASSESSMENT: 0-10

## 2024-05-03 ASSESSMENT — PAIN DESCRIPTION - FREQUENCY: FREQUENCY: INTERMITTENT

## 2024-05-03 ASSESSMENT — PAIN DESCRIPTION - DESCRIPTORS: DESCRIPTORS: TIGHTNESS

## 2024-05-03 ASSESSMENT — PAIN DESCRIPTION - PAIN TYPE: TYPE: ACUTE PAIN

## 2024-05-03 NOTE — CARE COORDINATION
Internal Medicine On-call Care Coordination Note    I was called by the ED physician because they recommended admission for this patient and I cover their PCP.  The history as I understand it after discussion with the ED physician is as follows:    The patient presented with chest pain and dyspnea  In the ED they found nothing alarming  CTA chest pending when they called me    I placed admission orders.  Including:    Chest pain:   Telemetry  Cardiology consultation  Defer need for further cardiac testing to cardiology  Troponin noted  ASA  Lipid panel    Diabetes mellitis    Continue home DM meds except metformin   SSI  HbA1c  Low carb diet    Dr. Zheng or his coverage will see the patient tomorrow for H&P.    Electronically signed by Henrry Zheng DO on 5/3/2024 at 3:38 PM

## 2024-05-03 NOTE — PROGRESS NOTES
Database initiated pharmacy and medications verified with the patient. He is A&O independent comes in from home alone.

## 2024-05-03 NOTE — PROGRESS NOTES
4 Eyes Skin Assessment     NAME:  Butch Pena  YOB: 1945  MEDICAL RECORD NUMBER:  [id number]    The patient is being assessed for  Admission    I agree that at least one RN has performed a thorough Head to Toe Skin Assessment on the patient. ALL assessment sites listed below have been assessed.      Areas assessed by both nurses:    Head, Face, Ears, Shoulders, Back, Chest, Arms, Elbows, Hands, Sacrum. Buttock, Coccyx, Ischium, Legs. Feet and Heels, and Under Medical Devices         Does the Patient have a Wound? No noted wound(s)       Valeriy Prevention initiated by RN: No  Wound Care Orders initiated by RN: No    Pressure Injury (Stage 3,4, Unstageable, DTI, NWPT, and Complex wounds) if present, place Wound referral order by RN under : No    New Ostomies, if present place, Ostomy referral order under : No     Nurse 1 eSignature: Electronically signed by Sofía Marin RN on 5/3/24 at 6:36 PM EDT    **SHARE this note so that the co-signing nurse can place an eSignature**    Nurse 2 eSignature: {Esignature:[id number]}

## 2024-05-03 NOTE — ED PROVIDER NOTES
[facility] EMERGENCY DEPARTMENT  EMERGENCY DEPARTMENT ENCOUNTER        Pt Name: Butch Pena  MRN: [id number]  Birthdate 1945  Date of evaluation: 5/3/2024  Provider: Jose Parks MD  PCP: Rula Munoz MD  Note Started: 1:36 PM EDT 5/3/24    CHIEF COMPLAINT       Chief Complaint   Patient presents with    Shortness of Breath     97% on RA, intermittent x1 week worse with exertion    Chest Pain     Chest tightness x1 month       HISTORY OF PRESENT ILLNESS: 1 or more Elements        Limitations to history : None    Butch Pena is a 79 y.o. male who presents for chest pain shortness of breath.  Patient reports off-and-on tightness in his chest for the last month.  He reports it is a vague discomfort.  Occurs intermittently.  He reports however he started developing intermittent exertional shortness of breath for the last week.  He reports when he exerts himself to do anything he feels short of breath which improves when he stops exerting himself.  He reports occasionally he gets tightness in his chest when this happens.  He reports he called his PCP who recommended he come to the ED, he attempted to call his cardiologist from [facility] cardiology but they cannot see him until June.  He reports his symptoms have improved.  He denies any fever or infectious symptoms.  He denies any cough.      Nursing Notes were all reviewed and agreed with or any disagreements were addressed in the HPI.      REVIEW OF EXTERNAL NOTE :       EKG from 9-, echo from 5-      Chart Review/External Note Review    Last Echo reviewed by Me:  Lab Results   Component Value Date    LVEF 60 08/02/2018             Controlled Substance Monitoring:    Acute and Chronic Pain Monitoring:        No data to display                    REVIEW OF SYSTEMS :      Positives and Pertinent negatives as per HPI.     SURGICAL HISTORY     Past Surgical History:   Procedure Laterality Date       COMPREHENSIVE METABOLIC PANEL - Abnormal; Notable for the following components:    Glucose 152 (*)     All other components within normal limits   D-DIMER, QUANTITATIVE - Abnormal; Notable for the following components:    D-Dimer, Quant 241 (*)     All other components within normal limits   CBC WITH AUTO DIFFERENTIAL   BRAIN NATRIURETIC PEPTIDE   TROPONIN   TROPONIN   POCT GLUCOSE   POCT GLUCOSE       As interpreted by me, the above displayed labs are abnormal. All other labs obtained during this visit were within normal range or not returned as of this dictation.    RADIOLOGY:   Unless a wet read is documented in the ED course or MDM, non-plain film images such as CT, Ultrasound and MRI are read by the radiologist unless otherwise specified in the medical decision-making.  Plain radiographic images are preliminarily interpreted by this ED Provider with the findings documented in the ED Course with official radiology read to follow.    Interpretation per the Radiologist below, if available at the time of this note:    XR CHEST (2 VW)   Final Result   1. Small left pleural effusion.   2. Some hazy opacity in the left mid/lower lung zone.  Favor   atelectasis/scarring.         CTA PULMONARY W CONTRAST    (Results Pending)     No results found.    No results found.      PAST MEDICAL HISTORY/Chronic Conditions Affecting Care      has a past medical history of Acute blood loss anemia (5/9/2018), BPH (benign prostatic hyperplasia), CAD (coronary artery disease), Constipation, COPD (chronic obstructive pulmonary disease) (HCC), Diabetes mellitus (HCC), Hyperlipidemia, Hypotension (8/1/2018), Respiratory insufficiency (5/8/2018), and Severe protein-calorie malnutrition (HCC) (8/24/2018).     EMERGENCY DEPARTMENT COURSE    Vitals:    Vitals:    05/03/24 1312   BP: 134/78   Pulse: 72   Resp: 16   Temp: 97.4 °F (36.3 °C)   SpO2: 97%   Weight: 89.4 kg (197 lb)   Height: 1.778 m (5' 10\")       Patient was given the following

## 2024-05-03 NOTE — ED NOTES
ED to Inpatient Handoff Report    Notified Antione LOPEZ that electronic handoff available and patient ready for transport to room 627.    Safety Risks: None identified    Patient in Restraints: no    Constant Observer or Patient : no    Telemetry Monitoring Ordered :Yes           Order to transfer to unit without monitor:yes    Last MEWS: 1 Time completed: 1632      Deterioration Index Score:   Predictive Model Details          22 (Normal)  Factor Value    Calculated 5/3/2024 16:33 62% Age 79 years old    Deterioration Index Model 16% Potassium 4.6 mmol/L     12% Respiratory rate 18     7% Systolic 137     1% WBC count 8.4 k/uL     1% Pulse 66     1% Sodium 138 mmol/L     0% Hematocrit 45.2 %     0% Temperature 97.9 °F (36.6 °C)     0% Pulse oximetry 96 %        Vitals:    05/03/24 1312 05/03/24 1632   BP: 134/78 137/75   Pulse: 72 66   Resp: 16 18   Temp: 97.4 °F (36.3 °C) 97.9 °F (36.6 °C)   TempSrc:  Oral   SpO2: 97% 96%   Weight: 89.4 kg (197 lb)    Height: 1.778 m (5' 10\")          Opportunity for questions and clarification was provided.

## 2024-05-04 ENCOUNTER — APPOINTMENT (OUTPATIENT)
Dept: NUCLEAR MEDICINE | Age: 79
End: 2024-05-04
Attending: INTERNAL MEDICINE
Payer: COMMERCIAL

## 2024-05-04 ENCOUNTER — APPOINTMENT (OUTPATIENT)
Age: 79
End: 2024-05-04
Attending: INTERNAL MEDICINE
Payer: COMMERCIAL

## 2024-05-04 VITALS
RESPIRATION RATE: 18 BRPM | DIASTOLIC BLOOD PRESSURE: 63 MMHG | TEMPERATURE: 97.7 F | BODY MASS INDEX: 28.2 KG/M2 | HEART RATE: 75 BPM | WEIGHT: 197 LBS | SYSTOLIC BLOOD PRESSURE: 115 MMHG | OXYGEN SATURATION: 95 % | HEIGHT: 70 IN

## 2024-05-04 LAB
ALBUMIN SERPL-MCNC: 3.9 G/DL (ref 3.5–5.2)
ALP SERPL-CCNC: 61 U/L (ref 40–129)
ALT SERPL-CCNC: 16 U/L (ref 0–40)
ANION GAP SERPL CALCULATED.3IONS-SCNC: 8 MMOL/L (ref 7–16)
AST SERPL-CCNC: 17 U/L (ref 0–39)
BILIRUB SERPL-MCNC: 0.6 MG/DL (ref 0–1.2)
BUN SERPL-MCNC: 10 MG/DL (ref 6–23)
CALCIUM SERPL-MCNC: 8.9 MG/DL (ref 8.6–10.2)
CHLORIDE SERPL-SCNC: 104 MMOL/L (ref 98–107)
CHOLEST SERPL-MCNC: 86 MG/DL
CO2 SERPL-SCNC: 27 MMOL/L (ref 22–29)
CREAT SERPL-MCNC: 1.1 MG/DL (ref 0.7–1.2)
ERYTHROCYTE [DISTWIDTH] IN BLOOD BY AUTOMATED COUNT: 13 % (ref 11.5–15)
GFR, ESTIMATED: 69 ML/MIN/1.73M2
GLUCOSE BLD-MCNC: 134 MG/DL (ref 74–99)
GLUCOSE BLD-MCNC: 196 MG/DL (ref 74–99)
GLUCOSE SERPL-MCNC: 138 MG/DL (ref 74–99)
HBA1C MFR BLD: 7.6 % (ref 4–5.6)
HCT VFR BLD AUTO: 41.2 % (ref 37–54)
HDLC SERPL-MCNC: 34 MG/DL
HGB BLD-MCNC: 13.6 G/DL (ref 12.5–16.5)
LDLC SERPL CALC-MCNC: 21 MG/DL
MCH RBC QN AUTO: 31.8 PG (ref 26–35)
MCHC RBC AUTO-ENTMCNC: 33 G/DL (ref 32–34.5)
MCV RBC AUTO: 96.3 FL (ref 80–99.9)
PLATELET # BLD AUTO: 203 K/UL (ref 130–450)
PMV BLD AUTO: 11.1 FL (ref 7–12)
POTASSIUM SERPL-SCNC: 4.1 MMOL/L (ref 3.5–5)
PROT SERPL-MCNC: 6.1 G/DL (ref 6.4–8.3)
RBC # BLD AUTO: 4.28 M/UL (ref 3.8–5.8)
SODIUM SERPL-SCNC: 139 MMOL/L (ref 132–146)
TRIGL SERPL-MCNC: 156 MG/DL
VLDLC SERPL CALC-MCNC: 31 MG/DL
WBC OTHER # BLD: 6.9 K/UL (ref 4.5–11.5)

## 2024-05-04 PROCEDURE — 80061 LIPID PANEL: CPT

## 2024-05-04 PROCEDURE — A9500 TC99M SESTAMIBI: HCPCS | Performed by: RADIOLOGY

## 2024-05-04 PROCEDURE — 80053 COMPREHEN METABOLIC PANEL: CPT

## 2024-05-04 PROCEDURE — 3430000000 HC RX DIAGNOSTIC RADIOPHARMACEUTICAL: Performed by: RADIOLOGY

## 2024-05-04 PROCEDURE — 78452 HT MUSCLE IMAGE SPECT MULT: CPT | Performed by: INTERNAL MEDICINE

## 2024-05-04 PROCEDURE — G0378 HOSPITAL OBSERVATION PER HR: HCPCS

## 2024-05-04 PROCEDURE — 93017 CV STRESS TEST TRACING ONLY: CPT

## 2024-05-04 PROCEDURE — 93016 CV STRESS TEST SUPVJ ONLY: CPT | Performed by: INTERNAL MEDICINE

## 2024-05-04 PROCEDURE — 6360000002 HC RX W HCPCS: Performed by: INTERNAL MEDICINE

## 2024-05-04 PROCEDURE — 83036 HEMOGLOBIN GLYCOSYLATED A1C: CPT

## 2024-05-04 PROCEDURE — 6370000000 HC RX 637 (ALT 250 FOR IP): Performed by: INTERNAL MEDICINE

## 2024-05-04 PROCEDURE — 85027 COMPLETE CBC AUTOMATED: CPT

## 2024-05-04 PROCEDURE — 96372 THER/PROPH/DIAG INJ SC/IM: CPT

## 2024-05-04 PROCEDURE — 78452 HT MUSCLE IMAGE SPECT MULT: CPT

## 2024-05-04 PROCEDURE — 82962 GLUCOSE BLOOD TEST: CPT

## 2024-05-04 PROCEDURE — 93018 CV STRESS TEST I&R ONLY: CPT | Performed by: INTERNAL MEDICINE

## 2024-05-04 RX ORDER — SENNA AND DOCUSATE SODIUM 50; 8.6 MG/1; MG/1
1 TABLET, FILM COATED ORAL 2 TIMES DAILY
Qty: 30 TABLET | Refills: 0 | Status: SHIPPED | OUTPATIENT
Start: 2024-05-04

## 2024-05-04 RX ORDER — LISINOPRIL 40 MG/1
40 TABLET ORAL DAILY
Qty: 90 TABLET | Refills: 2 | Status: SHIPPED | OUTPATIENT
Start: 2024-05-04

## 2024-05-04 RX ORDER — TETRAKIS(2-METHOXYISOBUTYLISOCYANIDE)COPPER(I) TETRAFLUOROBORATE 1 MG/ML
30 INJECTION, POWDER, LYOPHILIZED, FOR SOLUTION INTRAVENOUS
Status: COMPLETED | OUTPATIENT
Start: 2024-05-04 | End: 2024-05-04

## 2024-05-04 RX ORDER — TETRAKIS(2-METHOXYISOBUTYLISOCYANIDE)COPPER(I) TETRAFLUOROBORATE 1 MG/ML
10 INJECTION, POWDER, LYOPHILIZED, FOR SOLUTION INTRAVENOUS
Status: COMPLETED | OUTPATIENT
Start: 2024-05-04 | End: 2024-05-04

## 2024-05-04 RX ORDER — FLUDROCORTISONE ACETATE 0.1 MG/1
0.1 TABLET ORAL DAILY
Qty: 90 TABLET | Refills: 0 | Status: SHIPPED | OUTPATIENT
Start: 2024-05-04

## 2024-05-04 RX ADMIN — Medication 10 MILLICURIE: at 08:35

## 2024-05-04 RX ADMIN — REGADENOSON 0.4 MG: 0.08 INJECTION, SOLUTION INTRAVENOUS at 09:47

## 2024-05-04 RX ADMIN — PANTOPRAZOLE SODIUM 40 MG: 40 TABLET, DELAYED RELEASE ORAL at 11:20

## 2024-05-04 RX ADMIN — Medication 30 MILLICURIE: at 09:45

## 2024-05-04 RX ADMIN — ALOGLIPTIN 12.5 MG: 12.5 TABLET, FILM COATED ORAL at 11:20

## 2024-05-04 RX ADMIN — TAMSULOSIN HYDROCHLORIDE 0.4 MG: 0.4 CAPSULE ORAL at 11:20

## 2024-05-04 RX ADMIN — ENOXAPARIN SODIUM 40 MG: 100 INJECTION SUBCUTANEOUS at 11:19

## 2024-05-04 RX ADMIN — ASPIRIN 81 MG CHEWABLE TABLET 81 MG: 81 TABLET CHEWABLE at 11:21

## 2024-05-04 RX ADMIN — DOCUSATE SODIUM 50 MG AND SENNOSIDES 8.6 MG 1 TABLET: 8.6; 5 TABLET, FILM COATED ORAL at 11:20

## 2024-05-04 NOTE — DISCHARGE SUMMARY
Discharge    At this time, patient is stable for discharge.  Discharged within 24 hours of admission after negative stress test.  Several medications were present on his medication reconciliation that he was not taking.  Attempted to clean that up.  Additionally, patient has had the symptoms for the last several months and they worsened in the last week.  Patient given information to follow-up with pulmonology as an outpatient for pulmonary function testing.       Medication List        CHANGE how you take these medications      aspirin 81 MG EC tablet  What changed: Another medication with the same name was removed. Continue taking this medication, and follow the directions you see here.            CONTINUE taking these medications      atorvastatin 40 MG tablet  Commonly known as: LIPITOR     fludrocortisone 0.1 MG tablet  Commonly known as: FLORINEF  Take 1 tablet by mouth daily     Januvia 50 MG tablet  Generic drug: SITagliptin     lisinopril 40 MG tablet  Commonly known as: PRINIVIL;ZESTRIL  Take 1 tablet by mouth daily     metFORMIN 1000 MG tablet  Commonly known as: GLUCOPHAGE  Take 1 tablet by mouth 2 times daily (with meals)     omeprazole 20 MG delayed release capsule  Commonly known as: PRILOSEC     propranolol 20 MG tablet  Commonly known as: INDERAL     Proventil  (90 Base) MCG/ACT inhaler  Generic drug: albuterol sulfate HFA     sennosides-docusate sodium 8.6-50 MG tablet  Commonly known as: SENOKOT-S  Take 1 tablet by mouth 2 times daily     tamsulosin 0.4 MG capsule  Commonly known as: FLOMAX     therapeutic multivitamin-minerals tablet     vitamin B-12 1000 MCG tablet  Commonly known as: CYANOCOBALAMIN            STOP taking these medications      docusate 100 MG Caps  Commonly known as: COLACE, DULCOLAX     ondansetron 8 MG Tbdp disintegrating tablet  Commonly known as: Zofran ODT     oxyCODONE 15 MG immediate release tablet  Commonly known as: OXY-IR     polyethylene glycol 17 g

## 2024-05-04 NOTE — PROGRESS NOTES
Nuclear Perfusion The left ventricle is normal in size.  No motion artifact or attenuation is visualized.  Tomographic images demonstrate a normal distribution of tracer uptake throughout all myocardial segments both on the post regadenoson and resting images.  Gated images demonstrate no evidence of regional wall motion abnormalities with normal contraction and thickening of all myocardial segments.  A post stress ejection fraction of 79% is calculated.   Stress Combined Conclusion Normal vasodilator SPECT myocardial perfusion imaging with a normal size left ventricular chamber and normal left ventricular systolic function.    Based on perfusion findings in conjunction with the patient's response to vasodilator administration, a low risk of cardiac events is suggested.     Lexiscan SPECT study as noted above. May eat. I think he I stable for home. Has scheduled appt at [facility] next month which he should keep. Continue home meds.

## 2024-05-04 NOTE — H&P
Internal Medicine History & Physical     Name: Butch Pena  : 1945  Chief Complaint: Shortness of Breath (97% on RA, intermittent x1 week worse with exertion) and Chest Pain (Chest tightness x1 month)  Primary Care Physician: Rula Munoz MD  Admission date: 5/3/2024  Date of service: 2024     History of Present Illness  Butch is a 79 y.o. year old male.  Patient has had progressive increase in shortness of breath with exertion.  Patient states been worse for the last week but he states that he has been having chest tightness for the last month.  Patient states that he had open heart surgery in 2018 and then had to have revision of the wound a couple years later in .  At that time the hardware had loosened.  Later they placed a plate over the sternum and screwed into place.  Patient denies fevers, chills, headache, vision or hearing changes, dysuria, hematuria, bloody stools, bloody urine.  Patient states he eats and drinks what he wants and does not follow a particular diet.  He does not smoke or drink.  Nothing was making his symptoms better.  Patient continued to worsen with becoming increasingly short of breath with less and less activity including walking or planting tomatoes.  Patient's daughter is at bedside and states that has become more apparent recently.    Patient and daughter are good historians.       ED course:   Initial blood work and imaging studies performed. Admission recommended by ED physician. Case was discussed with ED provider. Meds in ED consisted of the following:  Medications   potassium chloride (KLOR-CON M) extended release tablet 40 mEq (has no administration in time range)     Or   potassium bicarb-citric acid (EFFER-K) effervescent tablet 40 mEq (has no administration in time range)     Or   potassium chloride 10 mEq/100 mL IVPB (Peripheral Line) (has no administration in time range)   magnesium sulfate 2000 mg in 50 mL IVPB premix (has no administration in time  without slurring, sensation to fine touch intact in upper and lower extremities    Labs-   Lab Results   Component Value Date    WBC 6.9 05/04/2024    HGB 13.6 05/04/2024    HCT 41.2 05/04/2024     05/04/2024     05/04/2024    K 4.1 05/04/2024     05/04/2024    CREATININE 1.1 05/04/2024    BUN 10 05/04/2024    CO2 27 05/04/2024    GLUCOSE 138 (H) 05/04/2024    ALT 16 05/04/2024    AST 17 05/04/2024    INR 1.1 09/25/2018    APTT 29.2 08/01/2018     Lab Results   Component Value Date    CKTOTAL 94 09/25/2018    CKMB 2.8 09/25/2018    TROPONINI <0.01 09/25/2018      Component Value Units   Nuclear stress test with myocardial perfusion [[id number]]    Collected: 05/04/24 0935    Updated: 05/04/24 1237     Baseline Systolic  mmHg    Baseline Diastolic BP 78 mmHg    Stress Systolic  mmHg    Stress Diastolic BP 82 mmHg    Baseline HR 75 BPM    Stress Peak HR 89 BPM    Stress Estimated Workload 1.0 METS    Body Surface Area 2.1 m2    Stress Rate Pressure Product 13,706 BPM*mmHg    Stress Target  bpm    Stress Percent HR Achieved 63 %   Narrative:       Stress Combined Conclusion: Normal vasodilator SPECT myocardial  perfusion imaging with a normal size left ventricular chamber and normal  left ventricular systolic function. Based on perfusion findings in  conjunction with the patient's response to vasodilator administration, a  low risk of cardiac events is suggested.    Perfusion Comments: The left ventricle is normal in size.  No motion  artifact or attenuation is visualized.  Tomographic images demonstrate a  normal distribution of tracer uptake throughout all myocardial segments  both on the post regadenoson and resting images.  Gated images demonstrate  no evidence of regional wall motion abnormalities with normal contraction  and thickening of all myocardial segments.  A post stress ejection  fraction of 79% is calculated.    ECG: Resting ECG demonstrates incomplete right bundle

## 2024-05-04 NOTE — PROGRESS NOTES
P Quality Flow/Interdisciplinary Rounds Progress Note        Quality Flow Rounds held on May 4, 2024    Disciplines Attending:  Bedside Nurse    Butch Pena was admitted on 5/3/2024  1:14 PM    Anticipated Discharge Date:       Disposition:    Valeriy Score:  Valeriy Scale Score: 22    Readmission Risk              Risk of Unplanned Readmission:  0           Discussed patient goal for the day, patient clinical progression, and barriers to discharge.  The following Goal(s) of the Day/Commitment(s) have been identified:   Await Cardiology Consult,. Stress Test       Ashley Camacho RN  May 4, 2024

## 2024-05-04 NOTE — CONSULTS
[facility]    INPATIENT CARDIOLOGY CONSULT    Name: Butch Pena    Age: 79 y.o.    Date of Admission: 5/3/2024  1:14 PM    Date of Service: 5/4/2024    Reason for Consultation: Perioperative assessment    Referring Physician: Dr. Palmer  Primary Care Physician: Rula Munoz MD    History of Present Illness: The patient is a 79 y.o. year old male with a history of coronary disease bypass surgery x 2 in May 2018 with a LIMA to LAD, vein graft to OM1.  Last Lexiscan SPECT  study was 12/22/2020 EF 56% normal SPECT images.  History of hypertension, hyperlipidemia, diabetes, incomplete right bundle branch block.  He presents to the hospital at his physician's request for shortness of breath.  He states he has been short of breath for years but the day before his admission he was planting tomato plants using a shovel and became more short of breath and had to stop a couple of times to do the job.  Denies any chest pain like he had prior to bypass surgery which was a heavy tight feeling in his chest.  Has had no orthopnea or PND, no palpitations, no radiation, no sweats or nausea.  EKG was unchanged from office EKGs, troponin was 30 dropping to 25.  Looks very comfortable this morning.            Past Medical History:   Past Medical History:   Diagnosis Date    Acute blood loss anemia 5/9/2018    BPH (benign prostatic hyperplasia)     ricchuiti    CAD (coronary artery disease)     Constipation     COPD (chronic obstructive pulmonary disease) (HCC)     Diabetes mellitus (HCC)     Hyperlipidemia     Hypotension 8/1/2018    Respiratory insufficiency 5/8/2018    Severe protein-calorie malnutrition (HCC) 8/24/2018       Review of Systems:   Constitutional: No fever, chills, sweats  Cardiac: As per HPI  Pulmonary: No cough, wheeze, hemoptysis  HEENT: No visual disturbances, difficult swallowing  GI: No nausea, vomiting, diarrhea, abdominal pain, rectal bleeding  : No dysuria or hematuria  Endocrine: No

## 2024-05-05 LAB
ECHO BSA: 2.1 M2
STRESS BASELINE DIAS BP: 78 MMHG
STRESS BASELINE HR: 75 BPM
STRESS BASELINE SYS BP: 142 MMHG
STRESS ESTIMATED WORKLOAD: 1 METS
STRESS PEAK DIAS BP: 82 MMHG
STRESS PEAK SYS BP: 154 MMHG
STRESS PERCENT HR ACHIEVED: 63 %
STRESS POST PEAK HR: 89 BPM
STRESS RATE PRESSURE PRODUCT: NORMAL BPM*MMHG
STRESS TARGET HR: 142 BPM

## 2025-01-03 ENCOUNTER — HOSPITAL ENCOUNTER (OUTPATIENT)
Dept: MRI IMAGING | Age: 80
Discharge: HOME | End: 2025-01-03
Payer: MEDICARE

## 2025-01-03 DIAGNOSIS — G20.C PARKINSONISM, UNSPECIFIED PARKINSONISM TYPE (HCC): ICD-10-CM

## 2025-01-03 PROCEDURE — 70551 MRI BRAIN STEM W/O DYE: CPT

## 2025-05-16 ENCOUNTER — HOSPITAL ENCOUNTER (OUTPATIENT)
Age: 80
Discharge: HOME | End: 2025-05-18

## 2025-05-16 PROCEDURE — 88305 TISSUE EXAM BY PATHOLOGIST: CPT

## 2025-05-22 LAB — SURGICAL PATHOLOGY REPORT: NORMAL

## 2025-05-23 ENCOUNTER — APPOINTMENT (OUTPATIENT)
Dept: CT IMAGING | Age: 80
End: 2025-05-23
Payer: COMMERCIAL

## 2025-05-23 ENCOUNTER — HOSPITAL ENCOUNTER (EMERGENCY)
Age: 80
Discharge: HOME | End: 2025-05-24
Attending: EMERGENCY MEDICINE
Payer: COMMERCIAL

## 2025-05-23 VITALS
HEIGHT: 69 IN | BODY MASS INDEX: 26.36 KG/M2 | SYSTOLIC BLOOD PRESSURE: 123 MMHG | HEART RATE: 91 BPM | OXYGEN SATURATION: 96 % | DIASTOLIC BLOOD PRESSURE: 65 MMHG | RESPIRATION RATE: 18 BRPM | TEMPERATURE: 97.5 F | WEIGHT: 178 LBS

## 2025-05-23 DIAGNOSIS — E87.20 LACTIC ACIDOSIS: ICD-10-CM

## 2025-05-23 DIAGNOSIS — K59.00 CONSTIPATION, UNSPECIFIED CONSTIPATION TYPE: Primary | ICD-10-CM

## 2025-05-23 LAB
ALBUMIN SERPL-MCNC: 4.5 G/DL (ref 3.5–5.2)
ALP SERPL-CCNC: 89 U/L (ref 40–129)
ALT SERPL-CCNC: 15 U/L (ref 0–40)
ANION GAP SERPL CALCULATED.3IONS-SCNC: 14 MMOL/L (ref 7–16)
AST SERPL-CCNC: 18 U/L (ref 0–39)
BASOPHILS # BLD: 0.07 K/UL (ref 0–0.2)
BASOPHILS NFR BLD: 1 % (ref 0–2)
BILIRUB SERPL-MCNC: 1 MG/DL (ref 0–1.2)
BILIRUB UR QL STRIP: NEGATIVE
BUN SERPL-MCNC: 13 MG/DL (ref 6–23)
CALCIUM SERPL-MCNC: 9.9 MG/DL (ref 8.6–10.2)
CHLORIDE SERPL-SCNC: 99 MMOL/L (ref 98–107)
CLARITY UR: CLEAR
CO2 SERPL-SCNC: 23 MMOL/L (ref 22–29)
COLOR UR: YELLOW
CREAT SERPL-MCNC: 0.9 MG/DL (ref 0.7–1.2)
EOSINOPHIL # BLD: 0.06 K/UL (ref 0.05–0.5)
EOSINOPHILS RELATIVE PERCENT: 0 % (ref 0–6)
ERYTHROCYTE [DISTWIDTH] IN BLOOD BY AUTOMATED COUNT: 13.5 % (ref 11.5–15)
GFR, ESTIMATED: 86 ML/MIN/1.73M2
GLUCOSE SERPL-MCNC: 154 MG/DL (ref 74–99)
GLUCOSE UR STRIP-MCNC: NEGATIVE MG/DL
HCT VFR BLD AUTO: 44.4 % (ref 37–54)
HGB BLD-MCNC: 14.8 G/DL (ref 12.5–16.5)
HGB UR QL STRIP.AUTO: NEGATIVE
IMM GRANULOCYTES # BLD AUTO: 0.05 K/UL (ref 0–0.58)
IMM GRANULOCYTES NFR BLD: 0 % (ref 0–5)
KETONES UR STRIP-MCNC: ABNORMAL MG/DL
LACTATE BLDV-SCNC: 2.4 MMOL/L (ref 0.5–2.2)
LACTATE BLDV-SCNC: 3.3 MMOL/L (ref 0.5–2.2)
LEUKOCYTE ESTERASE UR QL STRIP: NEGATIVE
LYMPHOCYTES NFR BLD: 1.69 K/UL (ref 1.5–4)
LYMPHOCYTES RELATIVE PERCENT: 11 % (ref 20–42)
MCH RBC QN AUTO: 32.5 PG (ref 26–35)
MCHC RBC AUTO-ENTMCNC: 33.3 G/DL (ref 32–34.5)
MCV RBC AUTO: 97.6 FL (ref 80–99.9)
MONOCYTES NFR BLD: 1.08 K/UL (ref 0.1–0.95)
MONOCYTES NFR BLD: 7 % (ref 2–12)
NEUTROPHILS NFR BLD: 80 % (ref 43–80)
NEUTS SEG NFR BLD: 12.14 K/UL (ref 1.8–7.3)
NITRITE UR QL STRIP: NEGATIVE
PH UR STRIP: 6 [PH] (ref 5–8)
PLATELET # BLD AUTO: 258 K/UL (ref 130–450)
PMV BLD AUTO: 10.4 FL (ref 7–12)
POTASSIUM SERPL-SCNC: 4.8 MMOL/L (ref 3.5–5)
PROT SERPL-MCNC: 7.3 G/DL (ref 6.4–8.3)
PROT UR STRIP-MCNC: NEGATIVE MG/DL
RBC # BLD AUTO: 4.55 M/UL (ref 3.8–5.8)
RBC #/AREA URNS HPF: ABNORMAL /HPF
SODIUM SERPL-SCNC: 136 MMOL/L (ref 132–146)
SP GR UR STRIP: 1.02 (ref 1–1.03)
UROBILINOGEN UR STRIP-ACNC: 0.2 EU/DL (ref 0–1)
WBC #/AREA URNS HPF: ABNORMAL /HPF
WBC OTHER # BLD: 15.1 K/UL (ref 4.5–11.5)

## 2025-05-23 PROCEDURE — 6360000004 HC RX CONTRAST MEDICATION: Performed by: RADIOLOGY

## 2025-05-23 PROCEDURE — 85025 COMPLETE CBC W/AUTO DIFF WBC: CPT

## 2025-05-23 PROCEDURE — 80053 COMPREHEN METABOLIC PANEL: CPT

## 2025-05-23 PROCEDURE — 81001 URINALYSIS AUTO W/SCOPE: CPT

## 2025-05-23 PROCEDURE — 74177 CT ABD & PELVIS W/CONTRAST: CPT

## 2025-05-23 PROCEDURE — 99285 EMERGENCY DEPT VISIT HI MDM: CPT

## 2025-05-23 PROCEDURE — 83605 ASSAY OF LACTIC ACID: CPT

## 2025-05-23 PROCEDURE — 2580000003 HC RX 258: Performed by: EMERGENCY MEDICINE

## 2025-05-23 RX ORDER — DOCUSATE SODIUM 100 MG/1
100 CAPSULE, LIQUID FILLED ORAL 2 TIMES DAILY
Qty: 40 CAPSULE | Refills: 0 | Status: SHIPPED | OUTPATIENT
Start: 2025-05-23 | End: 2025-06-12

## 2025-05-23 RX ORDER — 0.9 % SODIUM CHLORIDE 0.9 %
500 INTRAVENOUS SOLUTION INTRAVENOUS ONCE
Status: COMPLETED | OUTPATIENT
Start: 2025-05-23 | End: 2025-05-24

## 2025-05-23 RX ORDER — IOPAMIDOL 755 MG/ML
75 INJECTION, SOLUTION INTRAVASCULAR
Status: COMPLETED | OUTPATIENT
Start: 2025-05-23 | End: 2025-05-23

## 2025-05-23 RX ORDER — 0.9 % SODIUM CHLORIDE 0.9 %
500 INTRAVENOUS SOLUTION INTRAVENOUS ONCE
Status: COMPLETED | OUTPATIENT
Start: 2025-05-23 | End: 2025-05-23

## 2025-05-23 RX ORDER — POLYETHYLENE GLYCOL 3350 17 G/17G
17 POWDER, FOR SOLUTION ORAL DAILY PRN
Qty: 30 PACKET | Refills: 0 | Status: SHIPPED | OUTPATIENT
Start: 2025-05-23 | End: 2025-06-22

## 2025-05-23 RX ADMIN — IOPAMIDOL 75 ML: 755 INJECTION, SOLUTION INTRAVENOUS at 20:13

## 2025-05-23 RX ADMIN — SODIUM CHLORIDE 500 ML: 0.9 INJECTION, SOLUTION INTRAVENOUS at 19:38

## 2025-05-23 ASSESSMENT — LIFESTYLE VARIABLES
HOW MANY STANDARD DRINKS CONTAINING ALCOHOL DO YOU HAVE ON A TYPICAL DAY: PATIENT DOES NOT DRINK
HOW OFTEN DO YOU HAVE A DRINK CONTAINING ALCOHOL: NEVER

## 2025-05-23 ASSESSMENT — PAIN SCALES - GENERAL: PAINLEVEL_OUTOF10: 8

## 2025-05-23 ASSESSMENT — PAIN DESCRIPTION - LOCATION: LOCATION: BUTTOCKS

## 2025-05-23 NOTE — ED TRIAGE NOTES
FIRST PROVIDER CONTACT ASSESSMENT NOTE       Department of Emergency Medicine                 First Provider Note            25  2:45 PM EDT    Date of Encounter: No admission date for patient encounter.    Patient Name: Butch Pena  : 1945  MRN: [id number]    Chief Complaint: Constipation (X 3 days  hx surgery last week and on pain pills )      History of Present Illness:   Butch Pena is a 80 y.o. male who presents to the ED for constipation.   States he just had surgery and is on pain meds.   No BM for 3 days     Focused Physical Exam:  VS:    ED Triage Vitals [25 1442]   BP Systolic BP Percentile Diastolic BP Percentile Temp Temp Source Pulse Respirations SpO2   123/65 -- -- 97.5 °F (36.4 °C) Oral 91 18 96 %      Height Weight - Scale         1.753 m (5' 9\") 80.7 kg (178 lb)              Physical Ex: Constitutional: Alert and non-toxic.    Medical History:  has a past medical history of Acute blood loss anemia, BPH (benign prostatic hyperplasia), CAD (coronary artery disease), Constipation, COPD (chronic obstructive pulmonary disease) (HCC), Diabetes mellitus (HCC), Hyperlipidemia, Hypotension, Respiratory insufficiency, and Severe protein-calorie malnutrition.  Surgical History:  has a past surgical history that includes Colonoscopy; Cholecystectomy; Appendectomy; Tonsillectomy; Knee arthroscopy (Right); pr cabg w/arterial graft four/>arterial grafts (N/A, 2018); pr cabg w/arterial graft four/>arterial grafts (N/A, 2018); pr ncc incl fluor gdnce dx w/cell washg spx (N/A, 2018); Cardiac catheterization (2018); and sternum separation repair (10/2019).  Social History:  reports that he has never smoked. He has never used smokeless tobacco. He reports that he does not drink alcohol and does not use drugs.  Family History: family history includes Breast Cancer in his mother; Diabetes in his father; Early Death in his father; High Blood Pressure in his brother; Lung  Cancer in his brother.    Allergies: Patient has no known allergies.     Initial Plan of Care: Initiate Treatment-Testing, Proceed toTreatment Area When Bed Available for ED Attending/MLP to Continue Care      ---END OF FIRST PROVIDER CONTACT ASSESSMENT NOTE---  Electronically signed by Ivette Bustamante PA-C   DD: 5/23/25

## 2025-05-24 LAB — LACTATE BLDV-SCNC: 2.8 MMOL/L (ref 0.5–2.2)

## 2025-05-24 PROCEDURE — 83605 ASSAY OF LACTIC ACID: CPT

## 2025-05-24 PROCEDURE — 2580000003 HC RX 258: Performed by: EMERGENCY MEDICINE

## 2025-05-24 RX ADMIN — SODIUM CHLORIDE 500 ML: 9 INJECTION, SOLUTION INTRAVENOUS at 00:30

## 2025-05-27 ASSESSMENT — ENCOUNTER SYMPTOMS
VOMITING: 0
ABDOMINAL PAIN: 1
CONSTIPATION: 1
SHORTNESS OF BREATH: 0
EYE REDNESS: 0
NAUSEA: 0

## 2025-05-27 NOTE — ED PROVIDER NOTES
[facility] EMERGENCY DEPARTMENT  EMERGENCY DEPARTMENT ENCOUNTER        Pt Name: Butch Pena  MRN: [id number]  Birthdate 1945  Date of evaluation: 5/23/2025  Provider: Yuli Gallardo DO  PCP: Rula Munoz MD  Note Started: 8:55 AM EDT 5/27/25    CHIEF COMPLAINT       Chief Complaint   Patient presents with    Constipation     X 3 days  hx surgery last week and on pain pills        HISTORY OF PRESENT ILLNESS: 1 or more Elements   Patient presents emergency department the chief complaint of constipation.  The patient states that he has not had a bowel movement over the last 3 days.  He does admit to passing flatus.  He does complain of mild lower abdominal pain.  Pain describes a cramping sensation.  It is mild in severity.  Nothing makes better.  Nothing is worse.  The patient states he does have tried over-the-counter medications with no improvement.  The patient reports he feels he is constipated secondary to taking narcotics following a skin cancer removal on his head last week.     Nursing Notes were all reviewed and agreed with or any disagreements were addressed in the HPI.    REVIEW OF SYSTEMS :      Review of Systems   Constitutional:  Negative for fever.   HENT:  Negative for congestion.    Eyes:  Negative for redness.   Respiratory:  Negative for shortness of breath.    Cardiovascular:  Negative for chest pain.   Gastrointestinal:  Positive for abdominal pain and constipation. Negative for nausea and vomiting.   Genitourinary:  Negative for dysuria.   Musculoskeletal:  Negative for arthralgias.   Skin:  Negative for rash.   Neurological:  Negative for dizziness.   Psychiatric/Behavioral:  Negative for confusion.    All other systems reviewed and are negative.      Positives and Pertinent negatives as per HPI.     SURGICAL HISTORY     Past Surgical History:   Procedure Laterality Date    APPENDECTOMY      CARDIAC CATHETERIZATION  04/26/2018    Dr. Valdes

## (undated) DEVICE — DRAIN CHN 19FR L0.25MM DIA6.3MM SIL RND HUBLESS FULL FLUT

## (undated) DEVICE — KIT SURG W7XL11IN 2 PKT UNTREATED NA

## (undated) DEVICE — INTENDED FOR TISSUE SEPARATION, AND OTHER PROCEDURES THAT REQUIRE A SHARP SURGICAL BLADE TO PUNCTURE OR CUT.: Brand: BARD-PARKER ® STAINLESS STEEL BLADES

## (undated) DEVICE — SURGICAL PROCEDURE PACK BASIC

## (undated) DEVICE — 1.5L THIN WALL CAN: Brand: CRD

## (undated) DEVICE — AVID DUAL STAGE VENOUS DRAINAGE CANNULA: Brand: AVID DUAL STAGE VENOUS DRAINAGE CANNULA

## (undated) DEVICE — PAD, DEFIB, ADULT, RADIOTRAN, PHYSIO, LO: Brand: MEDLINE

## (undated) DEVICE — SPONGE LAP W18XL18IN WHT COT 4 PLY FLD STRUNG RADPQ DISP ST

## (undated) DEVICE — SOLUTION IV IRRIG POUR BRL 0.9% SODIUM CHL 2F7124

## (undated) DEVICE — SURGICAL PROCEDURE PACK CRD SEHC

## (undated) DEVICE — AORTIC PUNCHES ARE USED TO CREATE A UNIFORM OPENING IN BLOOD VESSELS DURING CARDIOVASCULAR SURGERY. THE VESSEL GRAFT IS INSERTED INTO THE CREATED OPENING AND SUTURED TO THE VESSEL WALL. AORTIC LANCETS ARE USED TO MAKE A SMALL UNIFORM CUT IN A BLOOD VESSEL TO FACILITATE INSERTION OF AN AORTIC PUNCH.  PUNCHES COME IN VARIOUS LENGTHS, DIAMETERS AND TIP CONFIGURATIONS.: Brand: CLEANCUT ROTATING AORTIC PUNCH

## (undated) DEVICE — CATHETER THOR 32FR L23IN PVC 5 EYELET STR ATRAUM

## (undated) DEVICE — AEGIS 1" DISK 4MM HOLE, PEEL OPEN: Brand: MEDLINE

## (undated) DEVICE — GLOVE ORANGE PI 7   MSG9070

## (undated) DEVICE — BLADE CLIPPER GEN PURP NS

## (undated) DEVICE — YANKAUER,OPEN TIP,W/O VENT,STERILE: Brand: MEDLINE INDUSTRIES, INC.

## (undated) DEVICE — Z INACTIVE USE 2662641 SOLUTION IV 1000ML 140MEQ/L SOD 5MEQ/L K 3MEQ/L MG 27MEQ/L

## (undated) DEVICE — SOLUTION IV IRRIG WATER 1000ML POUR BRL 2F7114

## (undated) DEVICE — CAUTERY: TIP CLEANER XR 100/CS: Brand: MEDICAL ACTION INDUSTRIES

## (undated) DEVICE — PADDLE INTERN DEFIB CHILD

## (undated) DEVICE — GOWN,SIRUS,FABRNF,XL,20/CS: Brand: MEDLINE

## (undated) DEVICE — PACK,UNIV, II AURORA: Brand: MEDLINE

## (undated) DEVICE — Z INACTIVE USE 2641837 CLIP LIG M BLU TI HRT SHP WIRE HORZ 600 PER BX

## (undated) DEVICE — PERFUSION PACK CUST OPN HRT

## (undated) DEVICE — SOLUTION IV 50ML 0.9% SOD CHL PLAS CONT USP VIAFLX

## (undated) DEVICE — GOWN,SIRUS,FABRNF,L,20/CS: Brand: MEDLINE

## (undated) DEVICE — GRADUATE

## (undated) DEVICE — CANNULA INJ L2.5IN BLNT TIP 3MM CLR BODY W/ 1 W VLV DLP

## (undated) DEVICE — 6 FOOT DISPOSABLE EXTENSION CABLE WITH SAFE CONNECT / SCREW-DOWN

## (undated) DEVICE — ELECTRODE PT RET AD L9FT HI MOIST COND ADH HYDRGEL CORDED

## (undated) DEVICE — Device: Brand: VIRTUOSAPH PLUS WITH RADIAL INDICATION

## (undated) DEVICE — SET ACB VEIN

## (undated) DEVICE — TUBING, SUCTION, 3/16" X 12', STRAIGHT: Brand: MEDLINE

## (undated) DEVICE — Z CONVERTED USE 2275207 CLOTH PREP W7.5XL7.5IN 2% CHG SKIN ALC AND RNS FREE

## (undated) DEVICE — AGENT HEMSTAT W4XL8IN OXIDIZED REGENERATED CELOS ABSRB

## (undated) DEVICE — GLOVE SURG SZ 65 THK91MIL LTX FREE SYN POLYISOPRENE

## (undated) DEVICE — DRAIN SURG SGL COLL PT TB FOR ATS BG OASIS

## (undated) DEVICE — PACK OPEN HRT DRP

## (undated) DEVICE — SET CARDIAC II

## (undated) DEVICE — STERILE LATEX POWDER FREE SURGICAL GLOVES WITH HYDROGEL COATING: Brand: PROTEXIS

## (undated) DEVICE — BLOWER COR ART L16.5CM PLAS SHFT MAL W/ MIST IV SET AXIUS

## (undated) DEVICE — SKIN AFFIX SURG ADHESIVE 72/CS 0.55ML: Brand: MEDLINE

## (undated) DEVICE — SET PERF L15IN BLU CLMP MULT FEM LUER ON SGL INLET LEG DLP

## (undated) DEVICE — CLIP INT SM TI EZ LD LIG SYS WECK HORZ

## (undated) DEVICE — SET CARDIAC I

## (undated) DEVICE — LABEL MED CARD SURG 4 IN PANEL STRL

## (undated) DEVICE — CAMERA CARDIAC STRYKER 1488 HD

## (undated) DEVICE — KIT SURG PREP POVIDONE IOD WET FOR UNIV USE SPNG STK

## (undated) DEVICE — Device

## (undated) DEVICE — BASIC SINGLE BASIN 1-LF: Brand: MEDLINE INDUSTRIES, INC.

## (undated) DEVICE — SCOPE TERUMO ENDOVEIN 5MM

## (undated) DEVICE — CATHETER IV 18GA L1.16IN OD1.308MM ID0.978MM GRN VIALON

## (undated) DEVICE — PICO SINGLE USE NEGATIVE PRESSURE WOUND THERAPY SYSTEM 10CM X 30CM 4IN. X 12IN.: Brand: PICO

## (undated) DEVICE — DRAIN SURG L3/8-1/2IN DIA3/16IN SIL CARD CONN 1:1 BLAK

## (undated) DEVICE — CATHETER ETER IV L1IN OD22GA POLYUR PERIPH WNG HUB SFTY SHLD

## (undated) DEVICE — RETRACTOR RULTRACT INTERN MAMMARY ARTERY

## (undated) DEVICE — CARDIAC STRYKER STERNAL SAW

## (undated) DEVICE — INSUFFLATION TUBING SET WITH FILTER, FUNNEL CONNECTOR AND LUER LOCK: Brand: JOSNOE MEDICAL INC

## (undated) DEVICE — GLOVE ORANGE PI 7 1/2   MSG9075

## (undated) DEVICE — CATHETER THOR 32FR L23IN PVC 6 EYELET STR ATRAUM

## (undated) DEVICE — COVER US PRB W5XL96IN LTX W/ GEL

## (undated) DEVICE — MPS® DELIVERY SET W/ARREST AGENT AND ADDITIVE CASSETTES, HEAT EXCHANGER & 10 FT. DELIVERY TUBING: Brand: MPS

## (undated) DEVICE — CANNULA NSL ORAL AD FOR CAPNOFLEX CO2 O2 AIRLFE

## (undated) DEVICE — KIT PLT RATIO DISPNS KT 2IN CANN TIP SPRY TIP DISP MAGELLAN

## (undated) DEVICE — SS SUTURE, 3 PER SLEEVE: Brand: MYO/WIRE II

## (undated) DEVICE — TOWEL,OR,DSP,ST,BLUE,STD,6/PK,12PK/CS: Brand: MEDLINE

## (undated) DEVICE — SET SURG BASIN OPEN HEART NO  1 REUSABLE